# Patient Record
Sex: FEMALE | Race: WHITE | HISPANIC OR LATINO
[De-identification: names, ages, dates, MRNs, and addresses within clinical notes are randomized per-mention and may not be internally consistent; named-entity substitution may affect disease eponyms.]

---

## 2020-10-19 ENCOUNTER — APPOINTMENT (OUTPATIENT)
Dept: ORTHOPEDIC SURGERY | Facility: CLINIC | Age: 55
End: 2020-10-19
Payer: MEDICAID

## 2020-10-19 VITALS
HEART RATE: 62 BPM | BODY MASS INDEX: 28.32 KG/M2 | TEMPERATURE: 97.8 F | HEIGHT: 61 IN | SYSTOLIC BLOOD PRESSURE: 112 MMHG | DIASTOLIC BLOOD PRESSURE: 87 MMHG | WEIGHT: 150 LBS

## 2020-10-19 PROCEDURE — 73564 X-RAY EXAM KNEE 4 OR MORE: CPT | Mod: RT

## 2020-10-19 PROCEDURE — 99072 ADDL SUPL MATRL&STAF TM PHE: CPT

## 2020-10-19 PROCEDURE — 99204 OFFICE O/P NEW MOD 45 MIN: CPT

## 2020-10-19 NOTE — HISTORY OF PRESENT ILLNESS
[de-identified] : 54 year old female  presents with bilateral knee pain, L>R x years. She has been told she has arthritis and her left knee is "bone on bone" requiring a knee replacement. She presents for a second opinion. She was treated with a cortisone injection 2 months ago which helped temporarily. She is limited in the distance she can walk and cannot walk more than one block. She has difficulty navigating steps. She is icing and taking Tylenol for pain relief. She presents with MRIs performed at [facility] 10/5/20 for review.

## 2020-10-19 NOTE — PHYSICAL EXAM
[LE] : Sensory: Intact in bilateral lower extremities [Knee] : patellar 2+ and symmetric bilaterally [Ankle] : ankle 2+ and symmetric bilaterally [Plant] : plantar 2+ and symmetric bilaterally [PT] : posterior tibial 2+ and symmetric bilaterally [DP] : dorsalis pedis 2+ and symmetric bilaterally [Normal] : Alert and in no acute distress [Poor Appearance] : well-appearing [Acute Distress] : not in acute distress [Obese] : not obese [de-identified] : AP, lateral, tunnel and sunrise x-rays of both knees taken today demonstrate moderate to severe degenerative changes with varus alignment.  Left knee is worse than the right.\par The patient presents with MRI of the right and left knee performed October 5, 2020.  They demonstrate severe tricompartmental degenerative changes bilaterally. [de-identified] : The patient has no respiratory distress. Mood and affect are normal. The patient is alert and oriented to person, place and time.\par There is no pain with active or passive motion of the hips.  There is no tenderness of either hip.  She has varus deformities of both knees, worse on the left than on the right.  Range of motion 0 to 100 degrees on the right, 0 to 100 degrees on the left.  There is no instability of collateral or cruciate ligaments.  Quadriceps and hamstring function are intact.  She experiences pain with left knee motion.  Calves are soft and nontender.  The skin is intact.  There is no lymphedema.

## 2020-10-19 NOTE — DISCUSSION/SUMMARY
[de-identified] : The patient has end-stage osteoarthritis of both knees.  I have discussed the pathology, natural history and treatment options with her.  She has not had lasting relief from injections were medication.  She has tried physical therapy as well.  She would be a candidate for knee replacement surgery although it would be beneficial if she would lose weight and stop smoking.  I have gone over the the procedure of total knee replacement, pre and postoperative routines, risks, benefits and alternatives.  She will consider her options.

## 2020-10-26 ENCOUNTER — APPOINTMENT (OUTPATIENT)
Dept: ORTHOPEDIC SURGERY | Facility: CLINIC | Age: 55
End: 2020-10-26
Payer: MEDICAID

## 2020-10-26 VITALS — WEIGHT: 150 LBS | TEMPERATURE: 97.6 F | HEIGHT: 61 IN | BODY MASS INDEX: 28.32 KG/M2

## 2020-10-26 PROCEDURE — 99072 ADDL SUPL MATRL&STAF TM PHE: CPT

## 2020-10-26 PROCEDURE — 99213 OFFICE O/P EST LOW 20 MIN: CPT

## 2020-10-26 RX ORDER — UBIDECARENONE/VIT E ACET 100MG-5
CAPSULE ORAL
Refills: 0 | Status: ACTIVE | COMMUNITY

## 2020-10-26 NOTE — HISTORY OF PRESENT ILLNESS
[de-identified] : 54 year old female  presents for reevaluation of her left knee. She is interested in having a knee replacement and presents to discuss the surgery.

## 2020-10-26 NOTE — DISCUSSION/SUMMARY
[de-identified] : The patient is interested in left knee replacement.  I have gone over the procedure, risks, benefits and alternatives.  She is smoking less than a pack a day and I have informed her that she would be better served by discontinuing smoking prior to having her surgery.  I also would like her to have a consultation with one of my partners regarding robotic knee replacement because of her severe deformity.

## 2020-10-26 NOTE — PHYSICAL EXAM
[LE] : Sensory: Intact in bilateral lower extremities [Knee] : patellar 2+ and symmetric bilaterally [Ankle] : ankle 2+ and symmetric bilaterally [Plant] : plantar 2+ and symmetric bilaterally [DP] : dorsalis pedis 2+ and symmetric bilaterally [PT] : posterior tibial 2+ and symmetric bilaterally [Normal] : Alert and in no acute distress [Poor Appearance] : well-appearing [Acute Distress] : not in acute distress [Obese] : not obese [de-identified] : The patient has no respiratory distress. Mood and affect are normal. The patient is alert and oriented to person, place and time.\par There is no pain with active or passive motion of the hips.  There is no tenderness of either hip.  She has varus deformities of both knees, worse on the left than on the right.  Range of motion 0 to 100 degrees on the right, 0 to 100 degrees on the left.  There is no instability of collateral or cruciate ligaments.  Quadriceps and hamstring function are intact.  She experiences pain with left knee motion.  Calves are soft and nontender.  The skin is intact.  There is no lymphedema.

## 2020-10-30 ENCOUNTER — APPOINTMENT (OUTPATIENT)
Dept: ORTHOPEDIC SURGERY | Facility: CLINIC | Age: 55
End: 2020-10-30
Payer: MEDICAID

## 2020-10-30 VITALS — TEMPERATURE: 97.7 F

## 2020-10-30 VITALS
HEIGHT: 61 IN | WEIGHT: 150 LBS | BODY MASS INDEX: 28.32 KG/M2 | DIASTOLIC BLOOD PRESSURE: 87 MMHG | SYSTOLIC BLOOD PRESSURE: 112 MMHG | HEART RATE: 62 BPM

## 2020-10-30 PROCEDURE — 99214 OFFICE O/P EST MOD 30 MIN: CPT

## 2020-10-30 PROCEDURE — 99072 ADDL SUPL MATRL&STAF TM PHE: CPT

## 2020-11-05 NOTE — PHYSICAL EXAM
[Normal RUE] : Right Upper Extremity: No scars, rashes, lesions, ulcers, skin intact [Normal Finger/nose] : finger to nose coordination [Normal Touch] : sensation intact for touch [Poor Appearance] : well-appearing [Acute Distress] : not in acute distress [Obese] : obese [Normal] : no peripheral adenopathy appreciated [de-identified] : Patient appears stated age in no acute respiratory distress. Patient is alert oriented x3. Patient has normal mood and affect. \par Bilateral knee exam\par There is minimal to moderate effusion. Range of motion is 0-120°. Painful at the extremes of range of motion. \par There is medial and lateral joint line tenderness. \par Quadriceps strength is 4/5. There is some muscle loss in the quadriceps. \par Anteroposterior and mediolateral stability is intact Arnulfo test is negative. Alignment of the knee is in 5° of varus.\par 		\par \par Bilateral hip exam\par On inspection of the hip shows skin is normal. No evidence of rash. \par No loss of muscle.  Abductor strength is 5 out of 5. Hip flexor strength is 5.\par Range of motion of the hip at 90° flexion internal rotation is 15° external rotation is 30° pain-free. \par Hip has good stability in anterior and posterior direction. \par On lateral decubitus  examination there is no tenderness in the greater trochanter. \par Lower Extremity Examination \par Bilateral lower extremity skin is normal. There is no rash. There is no edema and lymphadenopathy.  DP and PT pulses intact. Sensation is intact. [de-identified] : X-rays of the bilateral knee 3 views shows moderate arthritis from different place

## 2020-11-05 NOTE — HISTORY OF PRESENT ILLNESS
[de-identified] : 54 year old female  presents with bilateral knee pain, L>R x years. She has been told she has arthritis and her left knee is "bone on bone" requiring a knee replacement. She presents for a second opinion. She was treated with a cortisone injection 2 months ago which helped temporarily. She is limited in the distance she can walk and cannot walk more than one block. She has difficulty navigating steps. She is icing and taking Tylenol for pain relief. She presents with MRIs performed at [facility] 10/5/20 for review. She saw Dr. Selwyn Hoang who said she would most benefit from robotic TKRs due to her valgus deformities. [2] : a minimum pain level of 2/10 [8] : a maximum pain level of 8/10 [Acetaminophen] : not relieved by acetaminophen [Exercise Regimen] : not relieved by exercise regimen [NSAIDs] : relieved by nonsteroidal anti-inflammatory drugs

## 2020-11-05 NOTE — DISCUSSION/SUMMARY
[de-identified] : Moderate arthritis of bilateral knee we will try conservative treatment NSAIDs physical therapy range of motion strengthening exercise.  Follow-up in 3 months

## 2022-06-21 ENCOUNTER — APPOINTMENT (OUTPATIENT)
Dept: ORTHOPEDIC SURGERY | Facility: CLINIC | Age: 57
End: 2022-06-21
Payer: MEDICAID

## 2022-06-21 VITALS — BODY MASS INDEX: 29.27 KG/M2 | WEIGHT: 155 LBS | HEIGHT: 61 IN

## 2022-06-21 PROCEDURE — 99204 OFFICE O/P NEW MOD 45 MIN: CPT

## 2022-06-21 NOTE — PHYSICAL EXAM
[Bilateral] : knee bilaterally [5___] : hamstring 5[unfilled]/5 [Positive] : positive Alexa [] : negative sitting straight leg raise [TWNoteComboBox7] : flexion 115 degrees [de-identified] : extension 0 degrees

## 2022-06-21 NOTE — HISTORY OF PRESENT ILLNESS
[Gradual] : gradual [7] : 7 [5] : 5 [Dull/Aching] : dull/aching [Localized] : localized [Intermittent] : intermittent [Meds] : meds [Standing] : standing [Walking] : walking [de-identified] : Has long h/o OA knees, left worse than right. Also has low back pain for long period of time. Has had cortisone injection left knee which didn't help. Wanted to try viscoinjections. Was told needs replacement [] : no [FreeTextEntry1] : CLEOPATRA Knees and lower back pain  [FreeTextEntry3] : a few years  [FreeTextEntry5] : No injury/trauma

## 2022-09-13 ENCOUNTER — APPOINTMENT (OUTPATIENT)
Dept: ORTHOPEDIC SURGERY | Facility: CLINIC | Age: 57
End: 2022-09-13

## 2022-09-13 VITALS — WEIGHT: 155 LBS | HEIGHT: 61 IN | BODY MASS INDEX: 29.27 KG/M2

## 2022-09-13 DIAGNOSIS — M43.16 SPONDYLOLISTHESIS, LUMBAR REGION: ICD-10-CM

## 2022-09-13 DIAGNOSIS — M17.0 BILATERAL PRIMARY OSTEOARTHRITIS OF KNEE: ICD-10-CM

## 2022-09-13 DIAGNOSIS — G89.29 LUMBAGO WITH SCIATICA, RIGHT SIDE: ICD-10-CM

## 2022-09-13 DIAGNOSIS — M54.41 LUMBAGO WITH SCIATICA, RIGHT SIDE: ICD-10-CM

## 2022-09-13 DIAGNOSIS — M51.36 OTHER INTERVERTEBRAL DISC DEGENERATION, LUMBAR REGION: ICD-10-CM

## 2022-09-13 DIAGNOSIS — M17.11 UNILATERAL PRIMARY OSTEOARTHRITIS, RIGHT KNEE: ICD-10-CM

## 2022-09-13 PROCEDURE — 99213 OFFICE O/P EST LOW 20 MIN: CPT

## 2022-09-13 NOTE — PHYSICAL EXAM
[Bilateral] : knee bilaterally [5___] : hamstring 5[unfilled]/5 [Positive] : positive Alexa [] : negative sitting straight leg raise [FreeTextEntry8] : improving [TWNoteComboBox7] : flexion 115 degrees [de-identified] : extension 0 degrees

## 2022-09-13 NOTE — HISTORY OF PRESENT ILLNESS
[Lower back] : lower back [Gradual] : gradual [7] : 7 [5] : 5 [Dull/Aching] : dull/aching [Localized] : localized [Intermittent] : intermittent [Meds] : meds [Standing] : standing [Walking] : walking [de-identified] : Has long h/o OA knees, left worse than right. Also has low back pain for long period of time. Has had cortisone injection left knee which didn't help. Wanted to try viscoinjections. Was told needs replacement [] : no [FreeTextEntry1] : CLEOPATRA Knees and lower back pain  [FreeTextEntry3] : a few years  [FreeTextEntry5] : No injury/trauma

## 2023-01-11 ENCOUNTER — OFFICE VISIT (OUTPATIENT)
Dept: OBGYN CLINIC | Facility: CLINIC | Age: 58
End: 2023-01-11

## 2023-01-11 ENCOUNTER — APPOINTMENT (OUTPATIENT)
Dept: RADIOLOGY | Facility: CLINIC | Age: 58
End: 2023-01-11

## 2023-01-11 ENCOUNTER — PREP FOR PROCEDURE (OUTPATIENT)
Dept: OBGYN CLINIC | Facility: CLINIC | Age: 58
End: 2023-01-11

## 2023-01-11 VITALS
SYSTOLIC BLOOD PRESSURE: 126 MMHG | DIASTOLIC BLOOD PRESSURE: 80 MMHG | BODY MASS INDEX: 34.52 KG/M2 | WEIGHT: 160 LBS | HEIGHT: 57 IN

## 2023-01-11 DIAGNOSIS — G89.29 CHRONIC PAIN OF LEFT KNEE: Primary | ICD-10-CM

## 2023-01-11 DIAGNOSIS — Z01.818 PREOP TESTING: ICD-10-CM

## 2023-01-11 DIAGNOSIS — M17.11 PRIMARY OSTEOARTHRITIS OF RIGHT KNEE: Primary | ICD-10-CM

## 2023-01-11 DIAGNOSIS — M25.561 PAIN IN BOTH KNEES, UNSPECIFIED CHRONICITY: ICD-10-CM

## 2023-01-11 DIAGNOSIS — M25.562 CHRONIC PAIN OF LEFT KNEE: Primary | ICD-10-CM

## 2023-01-11 DIAGNOSIS — M17.12 PRIMARY OSTEOARTHRITIS OF LEFT KNEE: ICD-10-CM

## 2023-01-11 DIAGNOSIS — M25.562 PAIN IN BOTH KNEES, UNSPECIFIED CHRONICITY: ICD-10-CM

## 2023-01-11 RX ORDER — FOLIC ACID 1 MG/1
1 TABLET ORAL DAILY
Qty: 30 TABLET | Refills: 2 | Status: SHIPPED | OUTPATIENT
Start: 2023-01-11

## 2023-01-11 RX ORDER — CHLORHEXIDINE GLUCONATE 0.12 MG/ML
15 RINSE ORAL ONCE
OUTPATIENT
Start: 2023-01-11 | End: 2023-01-11

## 2023-01-11 RX ORDER — ROPIVACAINE HYDROCHLORIDE 5 MG/ML
10 INJECTION, SOLUTION EPIDURAL; INFILTRATION; PERINEURAL
Status: COMPLETED | OUTPATIENT
Start: 2023-01-11 | End: 2023-01-11

## 2023-01-11 RX ORDER — FERROUS SULFATE TAB EC 324 MG (65 MG FE EQUIVALENT) 324 (65 FE) MG
324 TABLET DELAYED RESPONSE ORAL
Qty: 60 TABLET | Refills: 2 | Status: SHIPPED | OUTPATIENT
Start: 2023-01-11

## 2023-01-11 RX ORDER — NAPROXEN 500 MG/1
500 TABLET ORAL 2 TIMES DAILY WITH MEALS
Qty: 60 TABLET | Refills: 0 | Status: SHIPPED | OUTPATIENT
Start: 2023-01-11

## 2023-01-11 RX ORDER — ALENDRONATE SODIUM 70 MG/1
TABLET ORAL
COMMUNITY
Start: 2023-01-04

## 2023-01-11 RX ORDER — BETAMETHASONE SODIUM PHOSPHATE AND BETAMETHASONE ACETATE 3; 3 MG/ML; MG/ML
6 INJECTION, SUSPENSION INTRA-ARTICULAR; INTRALESIONAL; INTRAMUSCULAR; SOFT TISSUE
Status: COMPLETED | OUTPATIENT
Start: 2023-01-11 | End: 2023-01-11

## 2023-01-11 RX ORDER — SODIUM CHLORIDE, SODIUM LACTATE, POTASSIUM CHLORIDE, CALCIUM CHLORIDE 600; 310; 30; 20 MG/100ML; MG/100ML; MG/100ML; MG/100ML
50 INJECTION, SOLUTION INTRAVENOUS CONTINUOUS
OUTPATIENT
Start: 2023-01-11

## 2023-01-11 RX ORDER — CHLORHEXIDINE GLUCONATE 4 G/100ML
SOLUTION TOPICAL DAILY PRN
OUTPATIENT
Start: 2023-01-11

## 2023-01-11 RX ORDER — NAPROXEN 500 MG/1
500 TABLET ORAL 2 TIMES DAILY WITH MEALS
COMMUNITY
End: 2023-01-11 | Stop reason: SDUPTHER

## 2023-01-11 RX ORDER — ASCORBIC ACID 500 MG
500 TABLET ORAL 2 TIMES DAILY
Qty: 60 TABLET | Refills: 2 | Status: SHIPPED | OUTPATIENT
Start: 2023-01-11

## 2023-01-11 RX ADMIN — ROPIVACAINE HYDROCHLORIDE 10 ML: 5 INJECTION, SOLUTION EPIDURAL; INFILTRATION; PERINEURAL at 11:18

## 2023-01-11 RX ADMIN — BETAMETHASONE SODIUM PHOSPHATE AND BETAMETHASONE ACETATE 6 MG: 3; 3 INJECTION, SUSPENSION INTRA-ARTICULAR; INTRALESIONAL; INTRAMUSCULAR; SOFT TISSUE at 11:18

## 2023-01-11 NOTE — PROGRESS NOTES
Assessment:     1  Primary osteoarthritis of right knee    2  Preop testing    3  Primary osteoarthritis of left knee        Plan:     Problem List Items Addressed This Visit        Musculoskeletal and Integument    Primary osteoarthritis of right knee - Primary    Relevant Medications    naproxen (NAPROSYN) 500 mg tablet    Other Relevant Orders    Large joint arthrocentesis: R knee    Primary osteoarthritis of left knee    Relevant Medications    naproxen (NAPROSYN) 500 mg tablet    ascorbic acid (VITAMIN C) 500 MG tablet    ferrous sulfate 324 (65 Fe) mg    folic acid (FOLVITE) 1 mg tablet    Multiple Vitamin (MULTI-VITAMIN) tablet    Other Relevant Orders    XR knee 4+ vw left injury    XR knee 4+ vw right injury    Comprehensive metabolic panel    Hemoglobin A1C W/EAG Estimation    CBC and differential    Anemia Panel w/Reflex    Protime-INR    APTT    Type and screen    Ambulatory referral to [facility]    Ambulatory referral to Physical Therapy    Case request operating room: ARTHROPLASTY KNEE TOTAL (Completed)    EKG 12 lead    XR chest pa & lateral   Other Visit Diagnoses     Preop testing        Relevant Medications    ascorbic acid (VITAMIN C) 500 MG tablet    ferrous sulfate 324 (65 Fe) mg    folic acid (FOLVITE) 1 mg tablet    Multiple Vitamin (MULTI-VITAMIN) tablet    Other Relevant Orders    Comprehensive metabolic panel    Hemoglobin A1C W/EAG Estimation    CBC and differential    Anemia Panel w/Reflex    Protime-INR    APTT    Type and screen    Ambulatory referral to [facility]    Ambulatory referral to Physical Therapy    EKG 12 lead    XR chest pa & lateral        Findings today are consistent with severe end stage bilateral knee osteoarthritis  Imaging and prognosis was reviewed with the patient today  Patient has not had success with conservative treatments in the form of cortisone steroid injections, activities modification, home exercises, and treatment with other orthopedic doctor  I have discussed with patient continue conservative managements versus knee replacement  Patient has elected to proceed with a left total knee arthroplasty and right knee cortisone injection, since I do not recommend bilateral knee replacement at same time  Cortisone steroid injection was administered today to the right knee  Patient should avoid strenuous activities for the next 1-2 days  Patient should avoid vaccines for the next 2 weeks if possible  Can apply cold compress for soreness  If patient feels relief with the cortisone injections, procedure can be repeated every 3-4 months as needed  If patient sees minimal/no relief with cortisone injection, treatment with VISCO injections can be tried  I discussed with patient smoking cessation due to affect on healing after surgery  All patient's questions were answered to her satisfaction  This note is created using dictation transcription  It may contain typographical errors, grammatical errors, improperly dictated words, background noise and other errors  Discussed with patient surgical risks and complications including but not limited to infection, persistent pain, nerve and vessel injury, complications associated with anesthesia, DVT, exposure to COVID virus, problem with prosthesis, etc   Patient understands the risks and complication and consented to the surgery  Subjective:     Patient ID: Bruce Woo is a 56 y o  female  Chief Complaint:  The patient presents with a chief complaint of bilateral knee pain, left worse than the right  The pain began several year(s) ago and is not associated with an acute injury  The patient describes the pain as aching and sharp and 8 out of 10 in intensity  It is constant in timing, and localizes the pain to the anterior knee   The pain is worse with walking, standing, ascending stairs, descending stairs, going from a seated to standing position and squatting and relieved with rest, heat, ice, medication: Naproxen and tylenol used and beneficial, avoiding painful activities, cortisone injection  She admits to mechanical symptoms such as locking and catching  She admits to instability of the knee  Patient states she had a CSI 4-5 months ago with a rheumatologist in [place] and states she got moderate relief  She states she has had multiple CSI in the past  Patient notes she has history of Lupus  Patient is ambulating with cane today  Patient was also treated by orthopedic doctor in [facility] and also had recommendation for knee replacements in the past   Patient does not want to continue conservative treatments due to continued pain despite injections, activity modification, and home exercises  Information on patient's intake form was reviewed  Allergy:  No Known Allergies  Medications:  all current active meds have been reviewed  Past Medical History:  Past Medical History:   Diagnosis Date   • Asthma    • Lupus ([facility] [address] )      Past Surgical History:  Past Surgical History:   Procedure Laterality Date   • FOOT SURGERY Right      Family History:  History reviewed  No pertinent family history  Social History:  Social History     Substance and Sexual Activity   Alcohol Use Yes    Comment: occasional     Social History     Substance and Sexual Activity   Drug Use No     Social History     Tobacco Use   Smoking Status Every Day   • Packs/day: 0 50   • Years: 20 00   • Pack years: 10 00   • Types: Cigarettes   Smokeless Tobacco Not on file     Review of Systems   Constitutional: Negative for chills and fever  HENT: Negative for ear pain and sore throat  Eyes: Negative for pain and visual disturbance  Respiratory: Negative for cough and shortness of breath  Cardiovascular: Negative for chest pain and palpitations  Gastrointestinal: Negative for abdominal pain and vomiting  Genitourinary: Negative for dysuria and hematuria     Musculoskeletal: Positive for arthralgias (bilateral knee), gait problem (Ambulate with a cane) and joint swelling (Bilateral knee)  Negative for back pain  Skin: Negative for color change and rash  Neurological: Negative for seizures and syncope  Psychiatric/Behavioral: Negative  All other systems reviewed and are negative  Objective:  BP Readings from Last 1 Encounters:   01/11/23 126/80      Wt Readings from Last 1 Encounters:   01/11/23 72 6 kg (160 lb)      BMI:   Estimated body mass index is 34 62 kg/m² as calculated from the following:    Height as of this encounter: 4' 9" (1 448 m)  Weight as of this encounter: 72 6 kg (160 lb)  BSA:   Estimated body surface area is 1 64 meters squared as calculated from the following:    Height as of this encounter: 4' 9" (1 448 m)  Weight as of this encounter: 72 6 kg (160 lb)  Physical Exam  Vitals and nursing note reviewed  Constitutional:       Appearance: Normal appearance  She is well-developed  HENT:      Head: Normocephalic and atraumatic  Right Ear: External ear normal       Left Ear: External ear normal    Eyes:      Extraocular Movements: Extraocular movements intact  Conjunctiva/sclera: Conjunctivae normal       Pupils: Pupils are equal, round, and reactive to light  Cardiovascular:      Rate and Rhythm: Regular rhythm  Heart sounds: Normal heart sounds  No murmur heard  No gallop  Pulmonary:      Effort: Pulmonary effort is normal       Breath sounds: Wheezing present  Abdominal:      Palpations: Abdomen is soft  Musculoskeletal:      Cervical back: Normal range of motion and neck supple  Right knee: No effusion  Instability Tests: Medial Delta test negative and lateral Delta test negative  Left knee: No effusion  Instability Tests: Medial Delta test negative and lateral Delta test negative  Skin:     General: Skin is warm and dry  Neurological:      Mental Status: She is alert and oriented to person, place, and time     Psychiatric:         Mood and Affect: Mood normal          Behavior: Behavior normal        Right Knee Exam     Tenderness   The patient is experiencing tenderness in the medial joint line and lateral joint line  Range of Motion   Extension: 0   Flexion: 110     Tests   Delta:  Medial - negative Lateral - negative  Varus: negative Valgus: negative    Other   Erythema: absent  Scars: absent  Sensation: normal  Pulse: present  Swelling: mild  Effusion: no effusion present    Comments:  Genu varum      Left Knee Exam     Tenderness   The patient is experiencing tenderness in the medial joint line and lateral joint line  Range of Motion   Extension: 5   Flexion: 110     Tests   Delta:  Medial - negative Lateral - negative  Varus: negative Valgus: negative    Other   Erythema: absent  Scars: absent  Sensation: normal  Pulse: present  Swelling: mild  Effusion: no effusion present    Comments:  Genu Varum          Large joint arthrocentesis: R knee  Universal Protocol:  Consent: Verbal consent obtained  Risks and benefits: risks, benefits and alternatives were discussed  Consent given by: patient  Time out: Immediately prior to procedure a "time out" was called to verify the correct patient, procedure, equipment, support staff and site/side marked as required    Timeout called at: 1/11/2023 11:17 AM   Patient understanding: patient states understanding of the procedure being performed  Site marked: the operative site was marked  Patient identity confirmed: verbally with patient    Supporting Documentation  Indications: pain   Procedure Details  Location: knee - R knee  Preparation: Patient was prepped and draped in the usual sterile fashion  Needle size: 22 G  Ultrasound guidance: no  Approach: anterolateral  Medications administered: 6 mg betamethasone acetate-betamethasone sodium phosphate 6 (3-3) mg/mL; 10 mL ropivacaine 0 5 %    Patient tolerance: patient tolerated the procedure well with no immediate complications  Dressing:  Sterile dressing applied        I have personally reviewed pertinent films in PACS and my interpretation is XR bilateral knee demonstrates severe degenerative changes: severe osteoarthritis       Scribe Attestation    I,:  Mirtha Campo am acting as a scribe while in the presence of the attending physician :       I,:  Marycruz Caraballo MD personally performed the services described in this documentation    as scribed in my presence :

## 2023-01-11 NOTE — H&P (VIEW-ONLY)
Assessment:     1  Primary osteoarthritis of right knee    2  Preop testing    3  Primary osteoarthritis of left knee        Plan:     Problem List Items Addressed This Visit        Musculoskeletal and Integument    Primary osteoarthritis of right knee - Primary    Relevant Medications    naproxen (NAPROSYN) 500 mg tablet    Other Relevant Orders    Large joint arthrocentesis: R knee    Primary osteoarthritis of left knee    Relevant Medications    naproxen (NAPROSYN) 500 mg tablet    ascorbic acid (VITAMIN C) 500 MG tablet    ferrous sulfate 324 (65 Fe) mg    folic acid (FOLVITE) 1 mg tablet    Multiple Vitamin (MULTI-VITAMIN) tablet    Other Relevant Orders    XR knee 4+ vw left injury    XR knee 4+ vw right injury    Comprehensive metabolic panel    Hemoglobin A1C W/EAG Estimation    CBC and differential    Anemia Panel w/Reflex    Protime-INR    APTT    Type and screen    Ambulatory referral to [facility]    Ambulatory referral to Physical Therapy    Case request operating room: ARTHROPLASTY KNEE TOTAL (Completed)    EKG 12 lead    XR chest pa & lateral   Other Visit Diagnoses     Preop testing        Relevant Medications    ascorbic acid (VITAMIN C) 500 MG tablet    ferrous sulfate 324 (65 Fe) mg    folic acid (FOLVITE) 1 mg tablet    Multiple Vitamin (MULTI-VITAMIN) tablet    Other Relevant Orders    Comprehensive metabolic panel    Hemoglobin A1C W/EAG Estimation    CBC and differential    Anemia Panel w/Reflex    Protime-INR    APTT    Type and screen    Ambulatory referral to [facility]    Ambulatory referral to Physical Therapy    EKG 12 lead    XR chest pa & lateral        Findings today are consistent with severe end stage bilateral knee osteoarthritis  Imaging and prognosis was reviewed with the patient today  Patient has not had success with conservative treatments in the form of cortisone steroid injections, activities modification, home exercises, and treatment with other orthopedic doctor  I have discussed with patient continue conservative managements versus knee replacement  Patient has elected to proceed with a left total knee arthroplasty and right knee cortisone injection, since I do not recommend bilateral knee replacement at same time  Cortisone steroid injection was administered today to the right knee  Patient should avoid strenuous activities for the next 1-2 days  Patient should avoid vaccines for the next 2 weeks if possible  Can apply cold compress for soreness  If patient feels relief with the cortisone injections, procedure can be repeated every 3-4 months as needed  If patient sees minimal/no relief with cortisone injection, treatment with VISCO injections can be tried  I discussed with patient smoking cessation due to affect on healing after surgery  There is no first floor bathroom and it's medically necessary patient receives a commode  All patient's questions were answered to her satisfaction  This note is created using dictation transcription  It may contain typographical errors, grammatical errors, improperly dictated words, background noise and other errors  Discussed with patient surgical risks and complications including but not limited to infection, persistent pain, nerve and vessel injury, complications associated with anesthesia, DVT, exposure to COVID virus, problem with prosthesis, etc   Patient understands the risks and complication and consented to the surgery  Subjective:     Patient ID: Derrick Gonzales is a 56 y o  female  Chief Complaint:  The patient presents with a chief complaint of bilateral knee pain, left worse than the right  The pain began several year(s) ago and is not associated with an acute injury  The patient describes the pain as aching and sharp and 8 out of 10 in intensity  It is constant in timing, and localizes the pain to the anterior knee   The pain is worse with walking, standing, ascending stairs, descending stairs, going from a seated to standing position and squatting and relieved with rest, heat, ice, medication: Naproxen and tylenol used and beneficial, avoiding painful activities, cortisone injection  She admits to mechanical symptoms such as locking and catching  She admits to instability of the knee  Patient states she had a CSI 4-5 months ago with a rheumatologist in [place] and states she got moderate relief  She states she has had multiple CSI in the past  Patient notes she has history of Lupus  Patient is ambulating with cane today  Patient was also treated by orthopedic doctor in [facility] and also had recommendation for knee replacements in the past   Patient does not want to continue conservative treatments due to continued pain despite injections, activity modification, and home exercises  Information on patient's intake form was reviewed  Allergy:  No Known Allergies  Medications:  all current active meds have been reviewed  Past Medical History:  Past Medical History:   Diagnosis Date   • Asthma    • Lupus ([facility] [address] )      Past Surgical History:  Past Surgical History:   Procedure Laterality Date   • FOOT SURGERY Right      Family History:  History reviewed  No pertinent family history  Social History:  Social History     Substance and Sexual Activity   Alcohol Use Yes    Comment: occasional     Social History     Substance and Sexual Activity   Drug Use No     Social History     Tobacco Use   Smoking Status Every Day   • Packs/day: 0 50   • Years: 20 00   • Pack years: 10 00   • Types: Cigarettes   Smokeless Tobacco Not on file     Review of Systems   Constitutional: Negative for chills and fever  HENT: Negative for ear pain and sore throat  Eyes: Negative for pain and visual disturbance  Respiratory: Negative for cough and shortness of breath  Cardiovascular: Negative for chest pain and palpitations  Gastrointestinal: Negative for abdominal pain and vomiting     Genitourinary: Negative for dysuria and hematuria  Musculoskeletal: Positive for arthralgias (bilateral knee), gait problem (Ambulate with a cane) and joint swelling (Bilateral knee)  Negative for back pain  Skin: Negative for color change and rash  Neurological: Negative for seizures and syncope  Psychiatric/Behavioral: Negative  All other systems reviewed and are negative  Objective:  BP Readings from Last 1 Encounters:   01/11/23 126/80      Wt Readings from Last 1 Encounters:   01/11/23 72 6 kg (160 lb)      BMI:   Estimated body mass index is 34 62 kg/m² as calculated from the following:    Height as of this encounter: 4' 9" (1 448 m)  Weight as of this encounter: 72 6 kg (160 lb)  BSA:   Estimated body surface area is 1 64 meters squared as calculated from the following:    Height as of this encounter: 4' 9" (1 448 m)  Weight as of this encounter: 72 6 kg (160 lb)  Physical Exam  Vitals and nursing note reviewed  Constitutional:       Appearance: Normal appearance  She is well-developed  HENT:      Head: Normocephalic and atraumatic  Right Ear: External ear normal       Left Ear: External ear normal    Eyes:      Extraocular Movements: Extraocular movements intact  Conjunctiva/sclera: Conjunctivae normal       Pupils: Pupils are equal, round, and reactive to light  Cardiovascular:      Rate and Rhythm: Regular rhythm  Heart sounds: Normal heart sounds  No murmur heard  No gallop  Pulmonary:      Effort: Pulmonary effort is normal       Breath sounds: Wheezing present  Abdominal:      Palpations: Abdomen is soft  Musculoskeletal:      Cervical back: Normal range of motion and neck supple  Right knee: No effusion  Instability Tests: Medial Delta test negative and lateral Delta test negative  Left knee: No effusion  Instability Tests: Medial Delta test negative and lateral Delta test negative  Skin:     General: Skin is warm and dry     Neurological:      Mental Status: She is alert and oriented to person, place, and time  Psychiatric:         Mood and Affect: Mood normal          Behavior: Behavior normal        Right Knee Exam     Tenderness   The patient is experiencing tenderness in the medial joint line and lateral joint line  Range of Motion   Extension: 0   Flexion: 110     Tests   Delta:  Medial - negative Lateral - negative  Varus: negative Valgus: negative    Other   Erythema: absent  Scars: absent  Sensation: normal  Pulse: present  Swelling: mild  Effusion: no effusion present    Comments:  Genu varum      Left Knee Exam     Tenderness   The patient is experiencing tenderness in the medial joint line and lateral joint line  Range of Motion   Extension: 5   Flexion: 110     Tests   Delta:  Medial - negative Lateral - negative  Varus: negative Valgus: negative    Other   Erythema: absent  Scars: absent  Sensation: normal  Pulse: present  Swelling: mild  Effusion: no effusion present    Comments:  Genu Varum          Large joint arthrocentesis: R knee  Universal Protocol:  Consent: Verbal consent obtained  Risks and benefits: risks, benefits and alternatives were discussed  Consent given by: patient  Time out: Immediately prior to procedure a "time out" was called to verify the correct patient, procedure, equipment, support staff and site/side marked as required    Timeout called at: 1/11/2023 11:17 AM   Patient understanding: patient states understanding of the procedure being performed  Site marked: the operative site was marked  Patient identity confirmed: verbally with patient    Supporting Documentation  Indications: pain   Procedure Details  Location: knee - R knee  Preparation: Patient was prepped and draped in the usual sterile fashion  Needle size: 22 G  Ultrasound guidance: no  Approach: anterolateral  Medications administered: 6 mg betamethasone acetate-betamethasone sodium phosphate 6 (3-3) mg/mL; 10 mL ropivacaine 0 5 %    Patient tolerance: patient tolerated the procedure well with no immediate complications  Dressing:  Sterile dressing applied        I have personally reviewed pertinent films in PACS and my interpretation is XR bilateral knee demonstrates severe degenerative changes: severe osteoarthritis       Scribe Attestation    I,:  David Castrejon am acting as a scribe while in the presence of the attending physician :       I,:  Master Guillen MD personally performed the services described in this documentation    as scribed in my presence :

## 2023-01-16 ENCOUNTER — TELEPHONE (OUTPATIENT)
Dept: OBGYN CLINIC | Facility: HOSPITAL | Age: 58
End: 2023-01-16

## 2023-01-16 NOTE — TELEPHONE ENCOUNTER
Preoperative Elective Admission Assessment- spoke to patient    [facility]-  UB     Living Situation:    Who does pt live with: Son, Daughter and grand daughter   What kind of home: multi-level  How do they enter the home: front  How many levels in home: 2   # of steps to enter home: 2-3 steps to enter   # of steps to second floor: 12-13 to 2nd floor   Are there handrails: Yes   Are there landings: Yes  Sleeping arrangement: first/entry floor  Where is Bathroom: First floor 1/2 bathroom  Where is the tub or shower: Second floor full bathroom is a step in tub     First Floor Setup:   Is there a bathroom: Yes  Where would pt sleep: couch, potentially moving bed down  DME: frame walker and cane, has script for [facility] and instructed to get  Patient's Current Level of Function: Ambulates with cane and ADLs: Independent    Post-op Caregiver: child  Caregiver Name and phone number for Inpatient discharge needs: Daughter and Son ([phone])  Currently receive any HHC/aides/community supports: No     Post-op Transport: child  To/from hospital: child  To/from PT 2-3x/week: child  Uses community transport now: No     Outpatient Physical Therapy Site:  Site: Not set up- will clarify with surgeon- patient told HHC  pre and post-op appts scheduled? No     Medication Management: self  Preferred Pharmacy for Post-op Medications: [facility]   Blood Management Vitamin Regimen: Pt confirms taking as prescribed  Post-op anticoagulant: to be determined by surgical team postoperatively     DC Plan: Pt plans to be discharged home  Pt educated that our goal, if at all possible, is to appropriately discharge patient based off their post-op function while striving to maintain maximal independence  If possible, the goal is to discharge patient to home and for them to attend outpatient physical therapy      Barriers to DC identified preoperatively: transportation- reports the surgeon told her [address] after surgery     BMI: 34 62     Not enrolled in [facility]     Patient Education:  Pt educated on post-op pain, early mobilization (POD0), Average inpt LOS, OP PT goal   Patient educated that our goal is to appropriately discharge patient based off their post-op function while striving to maintain maximal independence  The goal is to discharge patient to home and for them to attend outpatient physical therapy

## 2023-01-17 ENCOUNTER — APPOINTMENT (OUTPATIENT)
Dept: LAB | Facility: HOSPITAL | Age: 58
End: 2023-01-17

## 2023-01-17 ENCOUNTER — HOSPITAL ENCOUNTER (OUTPATIENT)
Dept: RADIOLOGY | Facility: HOSPITAL | Age: 58
Discharge: HOME | End: 2023-01-17

## 2023-01-17 DIAGNOSIS — Z01.818 PREOP TESTING: ICD-10-CM

## 2023-01-17 DIAGNOSIS — M17.12 OSTEOARTHRITIS OF LEFT KNEE, UNSPECIFIED OSTEOARTHRITIS TYPE: ICD-10-CM

## 2023-01-17 DIAGNOSIS — M17.12 PRIMARY OSTEOARTHRITIS OF LEFT KNEE: ICD-10-CM

## 2023-01-17 DIAGNOSIS — Z01.818 OTHER SPECIFIED PRE-OPERATIVE EXAMINATION: ICD-10-CM

## 2023-01-17 LAB
ABO GROUP BLD: NORMAL
ALBUMIN SERPL BCP-MCNC: 3.7 G/DL (ref 3.5–5)
ALP SERPL-CCNC: 86 U/L (ref 46–116)
ALT SERPL W P-5'-P-CCNC: 22 U/L (ref 12–78)
ANION GAP SERPL CALCULATED.3IONS-SCNC: 5 MMOL/L (ref 4–13)
APTT PPP: 26 SECONDS (ref 23–37)
AST SERPL W P-5'-P-CCNC: 14 U/L (ref 5–45)
BASOPHILS # BLD AUTO: 0.05 THOUSANDS/ÂΜL (ref 0–0.1)
BASOPHILS NFR BLD AUTO: 1 % (ref 0–1)
BILIRUB SERPL-MCNC: 0.47 MG/DL (ref 0.2–1)
BLD GP AB SCN SERPL QL: NEGATIVE
BUN SERPL-MCNC: 13 MG/DL (ref 5–25)
CALCIUM SERPL-MCNC: 9.4 MG/DL (ref 8.3–10.1)
CHLORIDE SERPL-SCNC: 109 MMOL/L (ref 96–108)
CO2 SERPL-SCNC: 25 MMOL/L (ref 21–32)
CREAT SERPL-MCNC: 0.52 MG/DL (ref 0.6–1.3)
EOSINOPHIL # BLD AUTO: 0.21 THOUSAND/ÂΜL (ref 0–0.61)
EOSINOPHIL NFR BLD AUTO: 4 % (ref 0–6)
ERYTHROCYTE [DISTWIDTH] IN BLOOD BY AUTOMATED COUNT: 12.8 % (ref 11.6–15.1)
EST. AVERAGE GLUCOSE BLD GHB EST-MCNC: 105 MG/DL
GFR SERPL CREATININE-BSD FRML MDRD: 107 ML/MIN/1.73SQ M
GLUCOSE P FAST SERPL-MCNC: 89 MG/DL (ref 65–99)
HBA1C MFR BLD: 5.3 %
HCT VFR BLD AUTO: 39.3 % (ref 34.8–46.1)
HGB BLD-MCNC: 13 G/DL (ref 11.5–15.4)
IMM GRANULOCYTES # BLD AUTO: 0.01 THOUSAND/UL (ref 0–0.2)
IMM GRANULOCYTES NFR BLD AUTO: 0 % (ref 0–2)
INR PPP: 0.85 (ref 0.84–1.19)
LYMPHOCYTES # BLD AUTO: 2.48 THOUSANDS/ÂΜL (ref 0.6–4.47)
LYMPHOCYTES NFR BLD AUTO: 43 % (ref 14–44)
MCH RBC QN AUTO: 30.2 PG (ref 26.8–34.3)
MCHC RBC AUTO-ENTMCNC: 33.1 G/DL (ref 31.4–37.4)
MCV RBC AUTO: 91 FL (ref 82–98)
MONOCYTES # BLD AUTO: 0.4 THOUSAND/ÂΜL (ref 0.17–1.22)
MONOCYTES NFR BLD AUTO: 7 % (ref 4–12)
NEUTROPHILS # BLD AUTO: 2.62 THOUSANDS/ÂΜL (ref 1.85–7.62)
NEUTS SEG NFR BLD AUTO: 45 % (ref 43–75)
NRBC BLD AUTO-RTO: 0 /100 WBCS
PLATELET # BLD AUTO: 322 THOUSANDS/UL (ref 149–390)
PMV BLD AUTO: 9.3 FL (ref 8.9–12.7)
POTASSIUM SERPL-SCNC: 3.9 MMOL/L (ref 3.5–5.3)
PROT SERPL-MCNC: 7.4 G/DL (ref 6.4–8.4)
PROTHROMBIN TIME: 12.3 SECONDS (ref 11.6–14.5)
RBC # BLD AUTO: 4.31 MILLION/UL (ref 3.81–5.12)
RH BLD: POSITIVE
SODIUM SERPL-SCNC: 139 MMOL/L (ref 135–147)
SPECIMEN EXPIRATION DATE: NORMAL
WBC # BLD AUTO: 5.77 THOUSAND/UL (ref 4.31–10.16)

## 2023-01-17 NOTE — TELEPHONE ENCOUNTER
Spoke to patient  Explained how postoperative PT is ordered postop, based on function in the hospital with our inpatient PT team  Educated to plan for outpatient PT      pt encouraged to call me with questions, concerns or issues

## 2023-01-19 RX ORDER — HYDROXYCHLOROQUINE SULFATE 200 MG/1
200 TABLET, FILM COATED ORAL
COMMUNITY

## 2023-01-19 NOTE — PRE-PROCEDURE INSTRUCTIONS
Pre-Surgery Instructions:   Medication Instructions   • albuterol (2 5 mg/3 mL) 0 083 % nebulizer solution Uses PRN- OK to take day of surgery   • albuterol (PROVENTIL HFA,VENTOLIN HFA) 90 mcg/act inhaler Uses PRN- OK to take day of surgery   • alendronate (FOSAMAX) 70 mg tablet Hold day of surgery  • ascorbic acid (VITAMIN C) 500 MG tablet Hold day of surgery  • ferrous sulfate 324 (65 Fe) mg Hold day of surgery  • folic acid (FOLVITE) 1 mg tablet Hold day of surgery  • hydroxychloroquine (PLAQUENIL) 200 mg tablet Take day of surgery  • Multiple Vitamin (MULTI-VITAMIN) tablet Hold day of surgery  • naproxen (NAPROSYN) 500 mg tablet Stop taking 7 days prior to surgery  Pt denies fever, sob, sore throat and cough  Pt verbalized understanding of shower, CHG cloth, IS and med instructions  Pt instructed to stop nsaids one week prior to surgery and to continue vitamins prescribed the surgeon

## 2023-01-20 ENCOUNTER — ANESTHESIA EVENT (OUTPATIENT)
Dept: PERIOP | Facility: HOSPITAL | Age: 58
End: 2023-01-20

## 2023-01-20 LAB
ATRIAL RATE: 61 BPM
P AXIS: -2 DEGREES
PR INTERVAL: 178 MS
QRS AXIS: -41 DEGREES
QRSD INTERVAL: 128 MS
QT INTERVAL: 454 MS
QTC INTERVAL: 457 MS
T WAVE AXIS: -4 DEGREES
VENTRICULAR RATE: 61 BPM

## 2023-01-23 ENCOUNTER — TELEPHONE (OUTPATIENT)
Dept: OBGYN CLINIC | Facility: HOSPITAL | Age: 58
End: 2023-01-23

## 2023-01-23 DIAGNOSIS — M17.12 PRIMARY OSTEOARTHRITIS OF LEFT KNEE: Primary | ICD-10-CM

## 2023-01-23 NOTE — TELEPHONE ENCOUNTER
Caller: patient  Doctor: Miryam Meza    Reason for call: would like to spk to [facility] [place]    Call back # [phone]

## 2023-01-23 NOTE — TELEPHONE ENCOUNTER
Called and spoke to patient  She wanted to make sure her PCP faxed over the office notes for her medical clearance  Can you make sure we received them? She forgot to give the PCP our form to fill out so her PCP was just going to fax the office notes  Thanks

## 2023-01-25 NOTE — TELEPHONE ENCOUNTER
New script placed with specification  Please let her know  Thanks  Surgery data letter updated in chart.

## 2023-01-25 NOTE — TELEPHONE ENCOUNTER
Caller: Patient    Doctor: Parish Nava    Reason for call: Patient called in requesting to speak with Edel Sainz  Patient stated that the script that she was given for her commode has to say that there is not a 1st floor bathroom so it is medically necessary for her to get the commode    Call back#: [phone]

## 2023-01-30 ENCOUNTER — EVALUATION (OUTPATIENT)
Dept: PHYSICAL THERAPY | Facility: CLINIC | Age: 58
End: 2023-01-30

## 2023-01-30 DIAGNOSIS — M17.12 PRIMARY OSTEOARTHRITIS OF LEFT KNEE: Primary | ICD-10-CM

## 2023-01-30 DIAGNOSIS — Z01.818 PREOP TESTING: ICD-10-CM

## 2023-01-30 NOTE — PROGRESS NOTES
PT Evaluation  and PT Discharge    Today's date: 2023  Patient name: Abeba Patton  : 1965  MRN: [id number]  Referring provider: Arnoldo Maya PA-C  Dx:   Encounter Diagnosis     ICD-10-CM    1  Primary osteoarthritis of left knee  M17 [id number] Ambulatory referral to Physical Therapy      2  Preop testing  Z01 [id number] Ambulatory referral to Physical Therapy                     Assessment  Assessment details: Abeba Patton is a 56 y o  female presenting to outpatient physical [facility] at [facility] with complaints of l knee pain, pre-op testing for TKA   They present with decreased range of motion, decreased strength, limited flexibility, poor postural awareness, poor body mechanics, poor balance, decreased tolerance to activity and decreased functional mobility due to Primary osteoarthritis of left knee  (primary encounter diagnosis)  Preop testing  Talya Gip would benefit from skilled physical therapy to address noted impairments in order to allow for full functional return to work and ADL-related activities  Thank you for the referral!  Impairments: abnormal muscle firing, abnormal or restricted ROM, activity intolerance, impaired balance, impaired physical strength, lacks appropriate home exercise program, pain with function and poor body mechanics  Barriers to therapy: n/a  Understanding of Dx/Px/POC: excellent  Goals  ST   Independent with HEP in 2 weeks - met    Plan  Plan details: D/c to HEP    Patient would benefit from: skilled physical therapy  Planned modality interventions: cryotherapy, electrical stimulation/Russian stimulation and thermotherapy: hydrocollator packs  Planned therapy interventions: abdominal trunk stabilization, manual therapy, neuromuscular re-education, therapeutic activities, therapeutic exercise, body mechanics training and home exercise program  Treatment plan discussed with: patient        Subjective Evaluation    History of Present Illness  Mechanism of injury: Pt presents to PT for pre-op testing for L TKA secondary to OA  Surgery is scheduled for 02/06/2023 by Dr Shanti Rodríguez  Pain is located anterior, medial and lateral joint lines  She will occasionally walk with a SPC for stability  She denies recent falls, but does feel unsteady while walking due to weakness  She does have a fear of falling  Pain is agg with prolonged walking and standing  Rated as moderate  Eased with injections, naproxen, turmeric, ice/heat  She does not currently work, but would like to get back to working after her recovery  She used to work in housekeeping, 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  She lives with her family in a multi-level home  Currently navigating stairs one at a time  Her bedroom is on the second floor, but she plans to stay in the living room after surgery until she feels comfortable using the stairs  She is independent with all ADLs and driving  Patient Goals  Patient goals for therapy: decreased edema, decreased pain, improved balance, increased motion, return to work, return to [employer] activities, independence with ADLs/IADLs and increased strength          Objective     Observations     Additional Observation Details  Ambulating with velcro brace, pt notes a h/o ligament tear which she uses the brace for stability and pain mgmt while walking  Tenderness     Additional Tenderness Details  Swelling noted entire knee joint  SLR to 90 deg with lag secondary to ext restriction   TTP medial and lateral joint lines, adductor, ITB, distal medial HS    Active Range of Motion   Left Knee   Flexion: 115 degrees WFL  Extension: 12 degrees with pain    Passive Range of Motion   Left Knee   Extension: 10 degrees     Strength/Myotome Testing     Left Hip   Planes of Motion   Flexion: 4+  Extension: 4  Abduction: 4  Adduction: 4    Right Hip   Planes of Motion   Flexion: 4+  Extension: 4+  Abduction: 4+  Adduction: 4+    Left Knee   Flexion: 4  Extension: 4  Quadriceps contraction: fair    Functional Assessment        Comments  Gait: antalgic, SPC use, decreased L knee flexion/extension during swing and stance phases             Precautions: L TKA scheduled for 2/6/23    HEP:  Access Code: [email or web address]  URL: [email or web address]  Date: 01/30/2023  Prepared by: Ian Patel    Exercises  Seated Long Arc Quad - 20 reps  Side Lunge Adductor Stretch - 20 reps  Seated Hamstring Stretch - 20 reps        Manuals 1/30            LLE STM MONICA                                                   Neuro Re-Ed                                                                                                        Ther Ex             LAQ x20            HS stretch x20            Adductor stretch L x20                                                                             Ther Activity                                       Gait Training                                       Modalities

## 2023-01-31 ENCOUNTER — APPOINTMENT (OUTPATIENT)
Dept: RADIOLOGY | Facility: CLINIC | Age: 58
End: 2023-01-31

## 2023-01-31 ENCOUNTER — TELEPHONE (OUTPATIENT)
Dept: OBGYN CLINIC | Facility: CLINIC | Age: 58
End: 2023-01-31

## 2023-01-31 ENCOUNTER — OFFICE VISIT (OUTPATIENT)
Dept: PODIATRY | Facility: CLINIC | Age: 58
End: 2023-01-31

## 2023-01-31 VITALS
BODY MASS INDEX: 34.52 KG/M2 | DIASTOLIC BLOOD PRESSURE: 71 MMHG | SYSTOLIC BLOOD PRESSURE: 101 MMHG | WEIGHT: 160 LBS | HEIGHT: 57 IN | HEART RATE: 67 BPM

## 2023-01-31 DIAGNOSIS — S90.30XA CONTUSION OF DORSUM OF FOOT: ICD-10-CM

## 2023-01-31 DIAGNOSIS — M17.12 PRIMARY OSTEOARTHRITIS OF LEFT KNEE: Primary | ICD-10-CM

## 2023-01-31 DIAGNOSIS — M79.671 PAIN IN BOTH FEET: ICD-10-CM

## 2023-01-31 DIAGNOSIS — S86.112A RUPTURE OF POSTERIOR TIBIALIS TENDON, LEFT, INITIAL ENCOUNTER: ICD-10-CM

## 2023-01-31 DIAGNOSIS — M79.671 PAIN IN BOTH FEET: Primary | ICD-10-CM

## 2023-01-31 DIAGNOSIS — M79.672 PAIN IN BOTH FEET: Primary | ICD-10-CM

## 2023-01-31 DIAGNOSIS — M79.672 PAIN IN BOTH FEET: ICD-10-CM

## 2023-01-31 NOTE — LETTER
February 2, 2023     Fabiana Romero  [address] [facility] [id number]    Patient: Tanna Rodriguez   YOB: 1965   Date of Visit: 1/31/2023       Dear Dr Bolivar Andres: Thank you for referring Miguel A Arreola to me for evaluation  Below are my notes for this consultation  If you have questions, please do not hesitate to call me  I look forward to following your patient along with you  Sincerely,        Kenroy Campos DPM        CC: No Recipients  [facility]  2/1/2023 10:43 PM  Signed  Assessment/Plan:    Pain in both feet  -     X-ray foot left 3+ views; Future  -     X-ray foot right 3+ views; Future    Rupture of posterior tibialis tendon, left, initial encounter  -     X-ray foot left 3+ views; Future    Contusion of dorsum of foot      Plan:  · X-ray left foot weightbearing to evaluate sagittal plane structure in anticipation of possible posterior tibial tendon repair with arthrodesis  · X-ray both feet to evaluate injury sustained from window dropping on feet  · Ice and anti-inflammatories as needed for pain/discomfort  · MRI report and images personally reviewed showing ruptured left posterior tibial tendon  · X-rays personally reviewed  Left foot shows severe pes planus with sagittal plane collapse at the talonavicular joint  Right foot X-rays reviewed show previous talonavicular arthrodesis and calcaneal osteotomy with retained plate and hardware  No evidence of fracture bilateral feet dorsal midfoot  Await final radiology interpretation  · Recommend conservative treatment plan for now with patient follow-up in 3 months after her knee replacement to discuss other treatment options for her left foot  Consider CAM Walker boot immobilization though this may impair her knee rehab  Subjective:     Patient ID: Tanna Rodriguez is a 56 y o  female      70-year-old female presents with MRI disc/report for left rear foot done in [place], documents rupture of left posterior tibial tendon where she has most of her pain/discomfort in her left foot  Secondary concern of dropping a glass window on top of both feet at the same time 3 weeks ago which has resulted in some pain on the top of both feet  Reports also having surgery on her right foot for a tendon rupture in the past and has some retained hardware  Patient is scheduled to have a total knee replacement on the left side within the next 4 to 6 weeks with Dr Blanca Dhillon  The following portions of the patient's history were reviewed and updated as appropriate: allergies, current medications, past family history, past medical history, past social history, past surgical history and problem list   Past Medical History:   Diagnosis Date   • Asthma    • Lupus ([address] )      Current Outpatient Medications on File Prior to Visit   Medication Sig Dispense Refill   • albuterol (2 5 mg/3 mL) 0 083 % nebulizer solution Take 2 5 mg by nebulization every 6 (six) hours as needed for wheezing     • albuterol (PROVENTIL HFA,VENTOLIN HFA) 90 mcg/act inhaler Inhale 2 puffs every 4 (four) hours as needed for wheezing     • alendronate (FOSAMAX) 70 mg tablet every 7 days     • ascorbic acid (VITAMIN C) 500 MG tablet Take 1 tablet (500 mg total) by mouth 2 (two) times a day 60 tablet 2   • ferrous sulfate 324 (65 Fe) mg Take 1 tablet (324 mg total) by mouth 2 (two) times a day before meals 60 tablet 2   • folic acid (FOLVITE) 1 mg tablet Take 1 tablet (1 mg total) by mouth daily 30 tablet 2   • hydroxychloroquine (PLAQUENIL) 200 mg tablet Take 200 mg by mouth daily with breakfast     • Multiple Vitamin (MULTI-VITAMIN) tablet Take 1 tablet by mouth daily 30 tablet 2   • naproxen (NAPROSYN) 500 mg tablet Take 1 tablet (500 mg total) by mouth 2 (two) times a day with meals 60 tablet 0     No current facility-administered medications on file prior to visit       Past Surgical History:   Procedure Laterality Date   • FOOT SURGERY Right     Talonavicular arthrodesis with calcaneal osteotomy     No Known Allergies        Review of Systems   Constitutional: Negative for chills and fever  HENT: Negative for ear pain and sore throat  Eyes: Negative for pain and visual disturbance  Respiratory: Negative for cough and shortness of breath  Cardiovascular: Negative for chest pain and palpitations  Gastrointestinal: Negative for abdominal pain and vomiting  Genitourinary: Negative for dysuria and hematuria  Musculoskeletal: Negative for arthralgias and back pain  Pain localized to medial left rear foot and the top of both feet   Skin: Negative for color change and rash  Neurological: Negative for seizures and syncope  All other systems reviewed and are negative  Objective:      /71 (BP Location: Left arm, Patient Position: Sitting, Cuff Size: Adult)   Pulse 67   Ht 4' 9" (1 448 m) Comment: verbal  Wt 72 6 kg (160 lb)   BMI 34 62 kg/m²         Physical Exam  Constitutional:       Appearance: Normal appearance  HENT:      Head: Normocephalic  Right Ear: Tympanic membrane normal       Left Ear: Tympanic membrane normal       Nose: No congestion  Mouth/Throat:      Mouth: Mucous membranes are moist       Pharynx: No oropharyngeal exudate or posterior oropharyngeal erythema  Eyes:      Extraocular Movements: Extraocular movements intact  Conjunctiva/sclera: Conjunctivae normal       Pupils: Pupils are equal, round, and reactive to light  Cardiovascular:      Rate and Rhythm: Normal rate and regular rhythm  Pulses:           Dorsalis pedis pulses are 2+ on the right side and 2+ on the left side  Posterior tibial pulses are 0 on the right side and 0 on the left side  Pulmonary:      Effort: Pulmonary effort is normal    Abdominal:      Palpations: Abdomen is soft  Musculoskeletal:         General: Swelling, tenderness and signs of injury present  Right foot: Decreased range of motion        Left foot: Decreased range of motion  Comments: Moderate pain with palpation medial left rear foot from navicular tuberosity coursing along the posterior tibial tendon around the medial malleolus  Localized edema palpable  Pain with active muscle testing  Generalized pain across the top of both feet secondary to a window falling on secondary to injury, no appreciable ecchymosis or edema noted  Feet:      Right foot:      Protective Sensation: 10 sites tested  10 sites sensed  Skin integrity: Skin integrity normal       Left foot:      Protective Sensation: 10 sites tested  10 sites sensed  Skin integrity: Skin integrity normal    Skin:     General: Skin is warm and dry  Capillary Refill: Capillary refill takes 2 to 3 seconds  Coloration: Skin is not pale  Findings: No bruising, erythema, lesion or rash  Neurological:      General: No focal deficit present  Mental Status: She is alert  Cranial Nerves: No cranial nerve deficit  Sensory: No sensory deficit  Motor: No weakness        Gait: Gait normal       Deep Tendon Reflexes: Reflexes normal    Psychiatric:         Mood and Affect: Mood normal          Behavior: Behavior normal          Judgment: Judgment normal

## 2023-01-31 NOTE — TELEPHONE ENCOUNTER
[facility] is requiring the office note have documentation stating, that there is no first floor bathroom and it's medically necessary patient receives a commode  They will not accept this on the script itself   Fax [phone]

## 2023-01-31 NOTE — PROGRESS NOTES
Assessment/Plan:    Pain in both feet  -     X-ray foot left 3+ views; Future  -     X-ray foot right 3+ views; Future    Rupture of posterior tibialis tendon, left, initial encounter  -     X-ray foot left 3+ views; Future    Contusion of dorsum of foot      Plan:  X-ray left foot weightbearing to evaluate sagittal plane structure in anticipation of possible posterior tibial tendon repair with arthrodesis  X-ray both feet to evaluate injury sustained from window dropping on feet  Ice and anti-inflammatories as needed for pain/discomfort  MRI report and images personally reviewed showing ruptured left posterior tibial tendon  X-rays personally reviewed  Left foot shows severe pes planus with sagittal plane collapse at the talonavicular joint  Right foot X-rays reviewed show previous talonavicular arthrodesis and calcaneal osteotomy with retained plate and hardware  No evidence of fracture bilateral feet dorsal midfoot  Await final radiology interpretation  Recommend conservative treatment plan for now with patient follow-up in 3 months after her knee replacement to discuss other treatment options for her left foot  Consider CAM Walker boot immobilization though this may impair her knee rehab  Subjective:      Patient ID: Funmi Otoole is a 56 y o  female  82-year-old female presents with MRI disc/report for left rear foot done in [place], documents rupture of left posterior tibial tendon where she has most of her pain/discomfort in her left foot  Secondary concern of dropping a glass window on top of both feet at the same time 3 weeks ago which has resulted in some pain on the top of both feet  Reports also having surgery on her right foot for a tendon rupture in the past and has some retained hardware  Patient is scheduled to have a total knee replacement on the left side within the next 4 to 6 weeks with Dr Alberto Rosario        The following portions of the patient's history were reviewed and updated as appropriate: allergies, current medications, past family history, past medical history, past social history, past surgical history and problem list   Past Medical History:   Diagnosis Date   • Asthma    • Lupus ([address] )      Current Outpatient Medications on File Prior to Visit   Medication Sig Dispense Refill   • albuterol (2 5 mg/3 mL) 0 083 % nebulizer solution Take 2 5 mg by nebulization every 6 (six) hours as needed for wheezing     • albuterol (PROVENTIL HFA,VENTOLIN HFA) 90 mcg/act inhaler Inhale 2 puffs every 4 (four) hours as needed for wheezing     • alendronate (FOSAMAX) 70 mg tablet every 7 days     • ascorbic acid (VITAMIN C) 500 MG tablet Take 1 tablet (500 mg total) by mouth 2 (two) times a day 60 tablet 2   • ferrous sulfate 324 (65 Fe) mg Take 1 tablet (324 mg total) by mouth 2 (two) times a day before meals 60 tablet 2   • folic acid (FOLVITE) 1 mg tablet Take 1 tablet (1 mg total) by mouth daily 30 tablet 2   • hydroxychloroquine (PLAQUENIL) 200 mg tablet Take 200 mg by mouth daily with breakfast     • Multiple Vitamin (MULTI-VITAMIN) tablet Take 1 tablet by mouth daily 30 tablet 2   • naproxen (NAPROSYN) 500 mg tablet Take 1 tablet (500 mg total) by mouth 2 (two) times a day with meals 60 tablet 0     No current facility-administered medications on file prior to visit  Past Surgical History:   Procedure Laterality Date   • FOOT SURGERY Right     Talonavicular arthrodesis with calcaneal osteotomy     No Known Allergies        Review of Systems   Constitutional: Negative for chills and fever  HENT: Negative for ear pain and sore throat  Eyes: Negative for pain and visual disturbance  Respiratory: Negative for cough and shortness of breath  Cardiovascular: Negative for chest pain and palpitations  Gastrointestinal: Negative for abdominal pain and vomiting  Genitourinary: Negative for dysuria and hematuria  Musculoskeletal: Negative for arthralgias and back pain  Pain localized to medial left rear foot and the top of both feet   Skin: Negative for color change and rash  Neurological: Negative for seizures and syncope  All other systems reviewed and are negative  Objective:      /71 (BP Location: Left arm, Patient Position: Sitting, Cuff Size: Adult)   Pulse 67   Ht 4' 9" (1 448 m) Comment: verbal  Wt 72 6 kg (160 lb)   BMI 34 62 kg/m²          Physical Exam  Constitutional:       Appearance: Normal appearance  HENT:      Head: Normocephalic  Right Ear: Tympanic membrane normal       Left Ear: Tympanic membrane normal       Nose: No congestion  Mouth/Throat:      Mouth: Mucous membranes are moist       Pharynx: No oropharyngeal exudate or posterior oropharyngeal erythema  Eyes:      Extraocular Movements: Extraocular movements intact  Conjunctiva/sclera: Conjunctivae normal       Pupils: Pupils are equal, round, and reactive to light  Cardiovascular:      Rate and Rhythm: Normal rate and regular rhythm  Pulses:           Dorsalis pedis pulses are 2+ on the right side and 2+ on the left side  Posterior tibial pulses are 0 on the right side and 0 on the left side  Pulmonary:      Effort: Pulmonary effort is normal    Abdominal:      Palpations: Abdomen is soft  Musculoskeletal:         General: Swelling, tenderness and signs of injury present  Right foot: Decreased range of motion  Left foot: Decreased range of motion  Comments: Moderate pain with palpation medial left rear foot from navicular tuberosity coursing along the posterior tibial tendon around the medial malleolus  Localized edema palpable  Pain with active muscle testing  Generalized pain across the top of both feet secondary to a window falling on secondary to injury, no appreciable ecchymosis or edema noted  Feet:      Right foot:      Protective Sensation: 10 sites tested  10 sites sensed        Skin integrity: Skin integrity normal  Left foot:      Protective Sensation: 10 sites tested  10 sites sensed  Skin integrity: Skin integrity normal    Skin:     General: Skin is warm and dry  Capillary Refill: Capillary refill takes 2 to 3 seconds  Coloration: Skin is not pale  Findings: No bruising, erythema, lesion or rash  Neurological:      General: No focal deficit present  Mental Status: She is alert  Cranial Nerves: No cranial nerve deficit  Sensory: No sensory deficit  Motor: No weakness        Gait: Gait normal       Deep Tendon Reflexes: Reflexes normal    Psychiatric:         Mood and Affect: Mood normal          Behavior: Behavior normal          Judgment: Judgment normal

## 2023-02-03 ENCOUNTER — TELEPHONE (OUTPATIENT)
Dept: OBGYN CLINIC | Facility: HOSPITAL | Age: 58
End: 2023-02-03

## 2023-02-03 NOTE — TELEPHONE ENCOUNTER
Caller: Capri    Doctor: Blanca Dhillon    Reason for call: wants script to be re-faxed to [facility] for Commode, patient will call to make sure it was received  I did refax script today    Fax: [phone]    Call back#: [phone]

## 2023-02-03 NOTE — TELEPHONE ENCOUNTER
My new order said bedside commode  Dr Roni Mendez already ammended his last office note to say what they need  Please fax to them  Thanks

## 2023-02-03 NOTE — TELEPHONE ENCOUNTER
[facility]    Doctor: Alberto Rosario    Reason for call: For the patient to receive her commode, the order needs to state "bedside commode"  They also need the last office note faxed over with the order and the office visit needs to state "patient has no bathroom facilities on her first floor"  Please fax to number below  Thank you!     Call back#: [phone]    Fax#:  [phone] Normal for race

## 2023-02-03 NOTE — TELEPHONE ENCOUNTER
Caller:  patient    Doctor: Isaura Mejia     Reason for call: patient called for the status of the commode prescription being faxed    Call got disconnected    Call back#:

## 2023-02-05 NOTE — ANESTHESIA PREPROCEDURE EVALUATION
Procedure:  ARTHROPLASTY KNEE TOTAL (Left: Knee)    Relevant Problems   MUSCULOSKELETAL   (+) Primary osteoarthritis of left knee   (+) Primary osteoarthritis of right knee      Asthma Lupus (HCC)   Both Stable, No recent Bronchdilators  Prosthetic Eye Left since birth  Physical Exam    Airway    Mallampati score: III  TM Distance: >3 FB  Neck ROM: full     Dental   Comment: Multiple Missing Teeth,     Cardiovascular      Pulmonary  Breath sounds clear to auscultation,     Other Findings        Anesthesia Plan  ASA Score- 2     Anesthesia Type- spinal with ASA Monitors  Additional Monitors:   Airway Plan:     Comment: Adductor Canal Block Planned  Plan Factors-Exercise tolerance (METS): >4 METS  Chart reviewed  EKG reviewed  Existing labs reviewed  Patient is a current smoker (4cigs/day)  Patient did not smoke on day of surgery  Induction-     Postoperative Plan-     Informed Consent- Anesthetic plan and risks discussed with patient  I personally reviewed this patient with the CRNA  Discussed and agreed on the Anesthesia Plan with the CRNA  Raul Pradhan Spinal technique discussed with Patient   Discussed side effects including post dural puncture headache with patient  All questions answered  Consent given  Discussed with Patient the procedure for Adductor Canal Block, side effects including extended numbness of the extremity and potential for an incomplete Block  All questions answered  Consent given      Lab Results   Component Value Date    GLUF 89 01/17/2023    ALT 22 01/17/2023    AST 14 01/17/2023    BUN 13 01/17/2023    CALCIUM 9 4 01/17/2023     (H) 01/17/2023    CO2 25 01/17/2023    CREATININE 0 52 (L) 01/17/2023    INR 0 85 01/17/2023    HCT 39 3 01/17/2023    HGB 13 0 01/17/2023    HGBA1C 5 3 01/17/2023     01/17/2023    K 3 9 01/17/2023    WBC 5 77 01/17/2023

## 2023-02-06 ENCOUNTER — ANESTHESIA (OUTPATIENT)
Dept: PERIOP | Facility: HOSPITAL | Age: 58
End: 2023-02-06

## 2023-02-06 ENCOUNTER — HOSPITAL ENCOUNTER (INPATIENT)
Facility: HOSPITAL | Age: 58
LOS: 1 days | Discharge: NURSING, REHAB OR LONG-TERM CARE | End: 2023-02-09
Attending: ORTHOPAEDIC SURGERY | Admitting: ORTHOPAEDIC SURGERY

## 2023-02-06 DIAGNOSIS — M17.12 PRIMARY OSTEOARTHRITIS OF LEFT KNEE: Primary | ICD-10-CM

## 2023-02-06 LAB
ABO GROUP BLD: NORMAL
ANION GAP SERPL CALCULATED.3IONS-SCNC: 5 MMOL/L (ref 4–13)
BLD GP AB SCN SERPL QL: NEGATIVE
BUN SERPL-MCNC: 7 MG/DL (ref 5–25)
CALCIUM SERPL-MCNC: 8.2 MG/DL (ref 8.3–10.1)
CHLORIDE SERPL-SCNC: 105 MMOL/L (ref 96–108)
CO2 SERPL-SCNC: 29 MMOL/L (ref 21–32)
CREAT SERPL-MCNC: 0.55 MG/DL (ref 0.6–1.3)
EXT PREGNANCY TEST URINE: NEGATIVE
EXT. CONTROL: NORMAL
GFR SERPL CREATININE-BSD FRML MDRD: 105 ML/MIN/1.73SQ M
GLUCOSE SERPL-MCNC: 140 MG/DL (ref 65–140)
PLATELET # BLD AUTO: 275 THOUSANDS/UL (ref 149–390)
PMV BLD AUTO: 9.3 FL (ref 8.9–12.7)
POTASSIUM SERPL-SCNC: 3.9 MMOL/L (ref 3.5–5.3)
RH BLD: POSITIVE
SODIUM SERPL-SCNC: 139 MMOL/L (ref 135–147)
SPECIMEN EXPIRATION DATE: NORMAL

## 2023-02-06 PROCEDURE — 0SRD0J9 REPLACEMENT OF LEFT KNEE JOINT WITH SYNTHETIC SUBSTITUTE, CEMENTED, OPEN APPROACH: ICD-10-PCS | Performed by: ORTHOPAEDIC SURGERY

## 2023-02-06 DEVICE — ATTUNE KNEE SYSTEM TIBIAL BASE ROTATING PLATFORM SIZE 4 CEMENTED
Type: IMPLANTABLE DEVICE | Site: KNEE | Status: FUNCTIONAL
Brand: ATTUNE

## 2023-02-06 DEVICE — ATTUNE KNEE SYSTEM FEMORAL POSTERIOR STABILIZED NARROW SIZE 4N LEFT CEMENTED
Type: IMPLANTABLE DEVICE | Site: KNEE | Status: FUNCTIONAL
Brand: ATTUNE

## 2023-02-06 DEVICE — ATTUNE KNEE SYSTEM TIBIAL INSERT ROTATING PLATFORM POSTERIOR STABILIZED 4 6MM AOX
Type: IMPLANTABLE DEVICE | Site: KNEE | Status: FUNCTIONAL
Brand: ATTUNE

## 2023-02-06 DEVICE — ATTUNE PATELLA MEDIALIZED DOME 35MM CEMENTED AOX
Type: IMPLANTABLE DEVICE | Site: PATELLA | Status: FUNCTIONAL
Brand: ATTUNE

## 2023-02-06 DEVICE — SMARTSET HIGH PERFORMANCE MV MEDIUM VISCOSITY BONE CEMENT 40G
Type: IMPLANTABLE DEVICE | Site: KNEE | Status: FUNCTIONAL
Brand: SMARTSET

## 2023-02-06 RX ORDER — PROPOFOL 10 MG/ML
INJECTION, EMULSION INTRAVENOUS AS NEEDED
Status: DISCONTINUED | OUTPATIENT
Start: 2023-02-06 | End: 2023-02-06

## 2023-02-06 RX ORDER — ENOXAPARIN SODIUM 100 MG/ML
30 INJECTION SUBCUTANEOUS EVERY 12 HOURS SCHEDULED
Status: DISCONTINUED | OUTPATIENT
Start: 2023-02-06 | End: 2023-02-09 | Stop reason: HOSPADM

## 2023-02-06 RX ORDER — FENTANYL CITRATE 50 UG/ML
INJECTION, SOLUTION INTRAMUSCULAR; INTRAVENOUS AS NEEDED
Status: DISCONTINUED | OUTPATIENT
Start: 2023-02-06 | End: 2023-02-06

## 2023-02-06 RX ORDER — ALBUTEROL SULFATE 2.5 MG/3ML
2.5 SOLUTION RESPIRATORY (INHALATION) EVERY 6 HOURS PRN
Status: DISCONTINUED | OUTPATIENT
Start: 2023-02-06 | End: 2023-02-09 | Stop reason: HOSPADM

## 2023-02-06 RX ORDER — BUPIVACAINE HYDROCHLORIDE 7.5 MG/ML
INJECTION, SOLUTION INTRASPINAL AS NEEDED
Status: DISCONTINUED | OUTPATIENT
Start: 2023-02-06 | End: 2023-02-06

## 2023-02-06 RX ORDER — CHLORHEXIDINE GLUCONATE 0.12 MG/ML
15 RINSE ORAL ONCE
Status: COMPLETED | OUTPATIENT
Start: 2023-02-06 | End: 2023-02-06

## 2023-02-06 RX ORDER — PROPOFOL 10 MG/ML
INJECTION, EMULSION INTRAVENOUS CONTINUOUS PRN
Status: DISCONTINUED | OUTPATIENT
Start: 2023-02-06 | End: 2023-02-06

## 2023-02-06 RX ORDER — ACETAMINOPHEN 325 MG/1
975 TABLET ORAL EVERY 8 HOURS
Status: DISCONTINUED | OUTPATIENT
Start: 2023-02-06 | End: 2023-02-09 | Stop reason: HOSPADM

## 2023-02-06 RX ORDER — ONDANSETRON 2 MG/ML
4 INJECTION INTRAMUSCULAR; INTRAVENOUS EVERY 4 HOURS PRN
Status: DISCONTINUED | OUTPATIENT
Start: 2023-02-06 | End: 2023-02-09 | Stop reason: HOSPADM

## 2023-02-06 RX ORDER — ASCORBIC ACID 500 MG
500 TABLET ORAL 2 TIMES DAILY
Status: DISCONTINUED | OUTPATIENT
Start: 2023-02-06 | End: 2023-02-09 | Stop reason: HOSPADM

## 2023-02-06 RX ORDER — OXYCODONE HYDROCHLORIDE 5 MG/1
10 TABLET ORAL EVERY 4 HOURS PRN
Status: DISCONTINUED | OUTPATIENT
Start: 2023-02-06 | End: 2023-02-09 | Stop reason: HOSPADM

## 2023-02-06 RX ORDER — MIDAZOLAM HYDROCHLORIDE 2 MG/2ML
INJECTION, SOLUTION INTRAMUSCULAR; INTRAVENOUS AS NEEDED
Status: DISCONTINUED | OUTPATIENT
Start: 2023-02-06 | End: 2023-02-06

## 2023-02-06 RX ORDER — CEFAZOLIN SODIUM 2 G/50ML
2000 SOLUTION INTRAVENOUS EVERY 8 HOURS
Status: COMPLETED | OUTPATIENT
Start: 2023-02-06 | End: 2023-02-07

## 2023-02-06 RX ORDER — TRANEXAMIC ACID 100 MG/ML
INJECTION, SOLUTION INTRAVENOUS AS NEEDED
Status: DISCONTINUED | OUTPATIENT
Start: 2023-02-06 | End: 2023-02-06

## 2023-02-06 RX ORDER — LIDOCAINE HYDROCHLORIDE 10 MG/ML
INJECTION, SOLUTION EPIDURAL; INFILTRATION; INTRACAUDAL; PERINEURAL AS NEEDED
Status: DISCONTINUED | OUTPATIENT
Start: 2023-02-06 | End: 2023-02-06

## 2023-02-06 RX ORDER — BUPIVACAINE HYDROCHLORIDE 5 MG/ML
INJECTION, SOLUTION EPIDURAL; INTRACAUDAL AS NEEDED
Status: DISCONTINUED | OUTPATIENT
Start: 2023-02-06 | End: 2023-02-06

## 2023-02-06 RX ORDER — DEXAMETHASONE SODIUM PHOSPHATE 10 MG/ML
INJECTION, SOLUTION INTRAMUSCULAR; INTRAVENOUS AS NEEDED
Status: DISCONTINUED | OUTPATIENT
Start: 2023-02-06 | End: 2023-02-06

## 2023-02-06 RX ORDER — PROMETHAZINE HYDROCHLORIDE 25 MG/ML
25 INJECTION, SOLUTION INTRAMUSCULAR; INTRAVENOUS ONCE AS NEEDED
Status: DISCONTINUED | OUTPATIENT
Start: 2023-02-06 | End: 2023-02-06 | Stop reason: HOSPADM

## 2023-02-06 RX ORDER — ALBUTEROL SULFATE 90 UG/1
2 AEROSOL, METERED RESPIRATORY (INHALATION) EVERY 4 HOURS PRN
Status: DISCONTINUED | OUTPATIENT
Start: 2023-02-06 | End: 2023-02-09 | Stop reason: HOSPADM

## 2023-02-06 RX ORDER — TRAMADOL HYDROCHLORIDE 50 MG/1
50 TABLET ORAL EVERY 6 HOURS SCHEDULED
Status: DISCONTINUED | OUTPATIENT
Start: 2023-02-06 | End: 2023-02-09 | Stop reason: HOSPADM

## 2023-02-06 RX ORDER — SODIUM CHLORIDE, SODIUM LACTATE, POTASSIUM CHLORIDE, CALCIUM CHLORIDE 600; 310; 30; 20 MG/100ML; MG/100ML; MG/100ML; MG/100ML
50 INJECTION, SOLUTION INTRAVENOUS CONTINUOUS
Status: DISCONTINUED | OUTPATIENT
Start: 2023-02-06 | End: 2023-02-06

## 2023-02-06 RX ORDER — HYDROXYCHLOROQUINE SULFATE 200 MG/1
200 TABLET, FILM COATED ORAL
Status: DISCONTINUED | OUTPATIENT
Start: 2023-02-07 | End: 2023-02-09 | Stop reason: HOSPADM

## 2023-02-06 RX ORDER — CYCLOBENZAPRINE HCL 5 MG
5 TABLET ORAL 3 TIMES DAILY
Status: DISCONTINUED | OUTPATIENT
Start: 2023-02-06 | End: 2023-02-09 | Stop reason: HOSPADM

## 2023-02-06 RX ORDER — FENTANYL CITRATE/PF 50 MCG/ML
25 SYRINGE (ML) INJECTION
Status: DISCONTINUED | OUTPATIENT
Start: 2023-02-06 | End: 2023-02-06 | Stop reason: HOSPADM

## 2023-02-06 RX ORDER — CEFAZOLIN SODIUM 2 G/50ML
2000 SOLUTION INTRAVENOUS ONCE
Status: COMPLETED | OUTPATIENT
Start: 2023-02-06 | End: 2023-02-06

## 2023-02-06 RX ORDER — FOLIC ACID 1 MG/1
1 TABLET ORAL DAILY
Status: DISCONTINUED | OUTPATIENT
Start: 2023-02-06 | End: 2023-02-09 | Stop reason: HOSPADM

## 2023-02-06 RX ORDER — SODIUM CHLORIDE, SODIUM LACTATE, POTASSIUM CHLORIDE, CALCIUM CHLORIDE 600; 310; 30; 20 MG/100ML; MG/100ML; MG/100ML; MG/100ML
INJECTION, SOLUTION INTRAVENOUS CONTINUOUS PRN
Status: DISCONTINUED | OUTPATIENT
Start: 2023-02-06 | End: 2023-02-06

## 2023-02-06 RX ORDER — MAGNESIUM HYDROXIDE/ALUMINUM HYDROXICE/SIMETHICONE 120; 1200; 1200 MG/30ML; MG/30ML; MG/30ML
15 SUSPENSION ORAL EVERY 6 HOURS PRN
Status: DISCONTINUED | OUTPATIENT
Start: 2023-02-06 | End: 2023-02-09 | Stop reason: HOSPADM

## 2023-02-06 RX ORDER — ONDANSETRON 2 MG/ML
INJECTION INTRAMUSCULAR; INTRAVENOUS AS NEEDED
Status: DISCONTINUED | OUTPATIENT
Start: 2023-02-06 | End: 2023-02-06

## 2023-02-06 RX ORDER — SENNOSIDES 8.6 MG
1 TABLET ORAL DAILY
Status: DISCONTINUED | OUTPATIENT
Start: 2023-02-06 | End: 2023-02-09 | Stop reason: HOSPADM

## 2023-02-06 RX ORDER — CHLORHEXIDINE GLUCONATE 4 G/100ML
SOLUTION TOPICAL DAILY PRN
Status: DISCONTINUED | OUTPATIENT
Start: 2023-02-06 | End: 2023-02-06

## 2023-02-06 RX ORDER — BISACODYL 10 MG
10 SUPPOSITORY, RECTAL RECTAL DAILY PRN
Status: DISCONTINUED | OUTPATIENT
Start: 2023-02-06 | End: 2023-02-09 | Stop reason: HOSPADM

## 2023-02-06 RX ORDER — DOCUSATE SODIUM 100 MG/1
100 CAPSULE, LIQUID FILLED ORAL 2 TIMES DAILY
Status: DISCONTINUED | OUTPATIENT
Start: 2023-02-06 | End: 2023-02-09 | Stop reason: HOSPADM

## 2023-02-06 RX ORDER — ALBUMIN, HUMAN INJ 5% 5 %
12.5 SOLUTION INTRAVENOUS ONCE
Status: COMPLETED | OUTPATIENT
Start: 2023-02-06 | End: 2023-02-07

## 2023-02-06 RX ORDER — SODIUM CHLORIDE, SODIUM LACTATE, POTASSIUM CHLORIDE, CALCIUM CHLORIDE 600; 310; 30; 20 MG/100ML; MG/100ML; MG/100ML; MG/100ML
100 INJECTION, SOLUTION INTRAVENOUS CONTINUOUS
Status: DISCONTINUED | OUTPATIENT
Start: 2023-02-06 | End: 2023-02-09

## 2023-02-06 RX ORDER — OXYCODONE HYDROCHLORIDE 5 MG/1
5 TABLET ORAL EVERY 4 HOURS PRN
Status: DISCONTINUED | OUTPATIENT
Start: 2023-02-06 | End: 2023-02-09 | Stop reason: HOSPADM

## 2023-02-06 RX ORDER — PANTOPRAZOLE SODIUM 40 MG/1
40 TABLET, DELAYED RELEASE ORAL DAILY
Status: DISCONTINUED | OUTPATIENT
Start: 2023-02-06 | End: 2023-02-09 | Stop reason: HOSPADM

## 2023-02-06 RX ORDER — FERROUS SULFATE 325(65) MG
325 TABLET ORAL 2 TIMES DAILY WITH MEALS
Status: DISCONTINUED | OUTPATIENT
Start: 2023-02-06 | End: 2023-02-09 | Stop reason: HOSPADM

## 2023-02-06 RX ORDER — HYDROMORPHONE HCL IN WATER/PF 6 MG/30 ML
0.2 PATIENT CONTROLLED ANALGESIA SYRINGE INTRAVENOUS
Status: DISCONTINUED | OUTPATIENT
Start: 2023-02-06 | End: 2023-02-06 | Stop reason: HOSPADM

## 2023-02-06 RX ORDER — METOCLOPRAMIDE HYDROCHLORIDE 5 MG/ML
10 INJECTION INTRAMUSCULAR; INTRAVENOUS ONCE AS NEEDED
Status: DISCONTINUED | OUTPATIENT
Start: 2023-02-06 | End: 2023-02-06 | Stop reason: HOSPADM

## 2023-02-06 RX ORDER — GABAPENTIN 100 MG/1
100 CAPSULE ORAL EVERY 8 HOURS
Status: DISCONTINUED | OUTPATIENT
Start: 2023-02-06 | End: 2023-02-09 | Stop reason: HOSPADM

## 2023-02-06 RX ORDER — MAGNESIUM HYDROXIDE 1200 MG/15ML
LIQUID ORAL AS NEEDED
Status: DISCONTINUED | OUTPATIENT
Start: 2023-02-06 | End: 2023-02-06 | Stop reason: HOSPADM

## 2023-02-06 RX ORDER — SIMETHICONE 80 MG
80 TABLET,CHEWABLE ORAL 4 TIMES DAILY PRN
Status: DISCONTINUED | OUTPATIENT
Start: 2023-02-06 | End: 2023-02-09 | Stop reason: HOSPADM

## 2023-02-06 RX ORDER — CALCIUM CARBONATE 200(500)MG
1000 TABLET,CHEWABLE ORAL DAILY PRN
Status: DISCONTINUED | OUTPATIENT
Start: 2023-02-06 | End: 2023-02-09 | Stop reason: HOSPADM

## 2023-02-06 RX ADMIN — CALCIUM CARBONATE (ANTACID) CHEW TAB 500 MG 1000 MG: 500 CHEW TAB at 17:03

## 2023-02-06 RX ADMIN — OXYCODONE HYDROCHLORIDE 5 MG: 5 TABLET ORAL at 16:29

## 2023-02-06 RX ADMIN — SODIUM CHLORIDE, SODIUM LACTATE, POTASSIUM CHLORIDE, AND CALCIUM CHLORIDE 50 ML/HR: .6; .31; .03; .02 INJECTION, SOLUTION INTRAVENOUS at 06:56

## 2023-02-06 RX ADMIN — CYCLOBENZAPRINE HYDROCHLORIDE 5 MG: 5 TABLET, FILM COATED ORAL at 20:25

## 2023-02-06 RX ADMIN — PROPOFOL 60 MCG/KG/MIN: 10 INJECTION, EMULSION INTRAVENOUS at 07:45

## 2023-02-06 RX ADMIN — ACETAMINOPHEN 975 MG: 325 TABLET, FILM COATED ORAL at 11:07

## 2023-02-06 RX ADMIN — ALBUMIN (HUMAN) 12.5 G: 12.5 INJECTION, SOLUTION INTRAVENOUS at 10:17

## 2023-02-06 RX ADMIN — TRAMADOL HYDROCHLORIDE 50 MG: 50 TABLET ORAL at 23:43

## 2023-02-06 RX ADMIN — FENTANYL CITRATE 50 MCG: 50 INJECTION, SOLUTION INTRAMUSCULAR; INTRAVENOUS at 07:12

## 2023-02-06 RX ADMIN — FOLIC ACID 1 MG: 1 TABLET ORAL at 17:02

## 2023-02-06 RX ADMIN — CEFAZOLIN SODIUM 2000 MG: 2 SOLUTION INTRAVENOUS at 10:20

## 2023-02-06 RX ADMIN — CYCLOBENZAPRINE HYDROCHLORIDE 5 MG: 5 TABLET, FILM COATED ORAL at 11:17

## 2023-02-06 RX ADMIN — SODIUM CHLORIDE, SODIUM LACTATE, POTASSIUM CHLORIDE, AND CALCIUM CHLORIDE 1000 ML: .6; .31; .03; .02 INJECTION, SOLUTION INTRAVENOUS at 22:45

## 2023-02-06 RX ADMIN — PROPOFOL 40 MG: 10 INJECTION, EMULSION INTRAVENOUS at 07:45

## 2023-02-06 RX ADMIN — STANDARDIZED SENNA CONCENTRATE 8.6 MG: 8.6 TABLET ORAL at 17:09

## 2023-02-06 RX ADMIN — CEFAZOLIN SODIUM 2000 MG: 2 SOLUTION INTRAVENOUS at 17:09

## 2023-02-06 RX ADMIN — TRANEXAMIC ACID 1 G: 100 INJECTION INTRAVENOUS at 07:55

## 2023-02-06 RX ADMIN — MIDAZOLAM 1 MG: 1 INJECTION INTRAMUSCULAR; INTRAVENOUS at 07:12

## 2023-02-06 RX ADMIN — ONDANSETRON 4 MG: 2 INJECTION INTRAMUSCULAR; INTRAVENOUS at 09:18

## 2023-02-06 RX ADMIN — GABAPENTIN 100 MG: 100 CAPSULE ORAL at 17:01

## 2023-02-06 RX ADMIN — DEXAMETHASONE SODIUM PHOSPHATE 10 MG: 10 INJECTION, SOLUTION INTRAMUSCULAR; INTRAVENOUS at 08:09

## 2023-02-06 RX ADMIN — BUPIVACAINE 20 ML: 13.3 INJECTION, SUSPENSION, LIPOSOMAL INFILTRATION at 07:16

## 2023-02-06 RX ADMIN — GABAPENTIN 100 MG: 100 CAPSULE ORAL at 11:17

## 2023-02-06 RX ADMIN — BUPIVACAINE HYDROCHLORIDE IN DEXTROSE 1.3 ML: 7.5 INJECTION, SOLUTION SUBARACHNOID at 07:55

## 2023-02-06 RX ADMIN — LIDOCAINE HYDROCHLORIDE 30 MG: 10 INJECTION, SOLUTION EPIDURAL; INFILTRATION; INTRACAUDAL; PERINEURAL at 07:45

## 2023-02-06 RX ADMIN — MULTIPLE VITAMINS W/ MINERALS TAB 1 TABLET: TAB ORAL at 17:02

## 2023-02-06 RX ADMIN — BUPIVACAINE HYDROCHLORIDE 5 ML: 5 INJECTION, SOLUTION EPIDURAL; INTRACAUDAL; PERINEURAL at 07:16

## 2023-02-06 RX ADMIN — SODIUM CHLORIDE, SODIUM LACTATE, POTASSIUM CHLORIDE, AND CALCIUM CHLORIDE 1000 ML: .6; .31; .03; .02 INJECTION, SOLUTION INTRAVENOUS at 20:25

## 2023-02-06 RX ADMIN — SODIUM CHLORIDE, SODIUM LACTATE, POTASSIUM CHLORIDE, AND CALCIUM CHLORIDE 75 ML/HR: .6; .31; .03; .02 INJECTION, SOLUTION INTRAVENOUS at 16:59

## 2023-02-06 RX ADMIN — OXYCODONE HYDROCHLORIDE 5 MG: 5 TABLET ORAL at 12:16

## 2023-02-06 RX ADMIN — ACETAMINOPHEN 975 MG: 325 TABLET, FILM COATED ORAL at 17:00

## 2023-02-06 RX ADMIN — FERROUS SULFATE TAB 325 MG (65 MG ELEMENTAL FE) 325 MG: 325 (65 FE) TAB at 17:01

## 2023-02-06 RX ADMIN — PANTOPRAZOLE SODIUM 40 MG: 40 TABLET, DELAYED RELEASE ORAL at 17:02

## 2023-02-06 RX ADMIN — TRAMADOL HYDROCHLORIDE 50 MG: 50 TABLET ORAL at 17:01

## 2023-02-06 RX ADMIN — SODIUM CHLORIDE, SODIUM LACTATE, POTASSIUM CHLORIDE, AND CALCIUM CHLORIDE: .6; .31; .03; .02 INJECTION, SOLUTION INTRAVENOUS at 07:32

## 2023-02-06 RX ADMIN — CYCLOBENZAPRINE HYDROCHLORIDE 5 MG: 5 TABLET, FILM COATED ORAL at 17:01

## 2023-02-06 RX ADMIN — CHLORHEXIDINE GLUCONATE 15 ML: 1.2 RINSE ORAL at 06:56

## 2023-02-06 RX ADMIN — DOCUSATE SODIUM 100 MG: 100 CAPSULE, LIQUID FILLED ORAL at 17:08

## 2023-02-06 RX ADMIN — SODIUM CHLORIDE, SODIUM LACTATE, POTASSIUM CHLORIDE, AND CALCIUM CHLORIDE 75 ML/HR: .6; .31; .03; .02 INJECTION, SOLUTION INTRAVENOUS at 23:43

## 2023-02-06 RX ADMIN — TRANEXAMIC ACID 1000 MG: 100 INJECTION, SOLUTION INTRAVENOUS at 07:22

## 2023-02-06 RX ADMIN — SODIUM CHLORIDE, SODIUM LACTATE, POTASSIUM CHLORIDE, AND CALCIUM CHLORIDE 75 ML/HR: .6; .31; .03; .02 INJECTION, SOLUTION INTRAVENOUS at 14:41

## 2023-02-06 RX ADMIN — TRAMADOL HYDROCHLORIDE 50 MG: 50 TABLET ORAL at 11:08

## 2023-02-06 RX ADMIN — OXYCODONE HYDROCHLORIDE AND ACETAMINOPHEN 500 MG: 500 TABLET ORAL at 17:02

## 2023-02-06 RX ADMIN — MIDAZOLAM 1 MG: 1 INJECTION INTRAMUSCULAR; INTRAVENOUS at 07:35

## 2023-02-06 RX ADMIN — CEFAZOLIN SODIUM 2000 MG: 2 SOLUTION INTRAVENOUS at 07:55

## 2023-02-06 NOTE — ANESTHESIA PROCEDURE NOTES
Peripheral Block    Patient location during procedure: holding area  Start time: 2/6/2023 7:11 AM  Reason for block: at surgeon's request and post-op pain management  Staffing  Performed: Anesthesiologist   Anesthesiologist: Trent Lipscomb MD  Preanesthetic Checklist  Completed: patient identified, IV checked, site marked, risks and benefits discussed, surgical consent, monitors and equipment checked, pre-op evaluation and timeout performed  Peripheral Block  Patient position: supine (L Thigh Abducted)  Prep: ChloraPrep  Patient monitoring: heart rate and continuous pulse ox  Block type: adductor canal block  Laterality: left  Injection technique: single-shot  Procedures: ultrasound guided, Ultrasound guidance required for the procedure to increase accuracy and safety of medication placement and decrease risk of complications    Ultrasound permanent image saved  Needle  Needle type: Stimuplex   Needle gauge: 20 G  Needle length: 10 cm  Needle localization: ultrasound guidance  Needle insertion depth: 3 cm  Assessment  Injection assessment: incremental injection, local visualized surrounding nerve on ultrasound, negative aspiration for heme and no paresthesia on injection  Paresthesia pain: none  Heart rate change: no  Slow fractionated injection: yes  Post-procedure:  site cleaned  patient tolerated the procedure well with no immediate complications

## 2023-02-06 NOTE — OCCUPATIONAL THERAPY NOTE
Occupational Therapy Evaluation     Patient Name: Anastasiya PINTO'S Date: 2/6/2023  Problem List  Active Problems:    * No active hospital problems  *    Past Medical History  Past Medical History:   Diagnosis Date    Asthma     Lupus ([address] )      Past Surgical History  Past Surgical History:   Procedure Laterality Date    FOOT SURGERY Right     Talonavicular arthrodesis with calcaneal osteotomy         02/06/23 1401   OT Last Visit   OT Visit Date 02/06/23   Note Type   Note type Evaluation   Pain Assessment   Pain Assessment Tool 0-10   Pain Score 8   Pain Location/Orientation Orientation: Left; Location: Knee   Hospital Pain Intervention(s) Rest;Repositioned   Restrictions/Precautions   Weight Bearing Precautions Per Order Yes   LLE Weight Bearing Per Order WBAT   Other Precautions Fall Risk;Pain;Telemetry   Home Living   Type of [address] [place] [address] Two level; Able to live on main level with bedroom/bathroom   Bathroom Shower/Tub Tub/shower unit   Home Equipment Walker;Cane  (Used cane at baseline)   Prior Function   Level of [place] Independent with ADLs   Lives With [facility] operated by [facility]   IADLs Independent with driving   Falls in the last 6 months 0   Subjective   Subjective Pt received in supine position  Pt agreeable to session     ADL   Eating Assistance 7  Independent   Grooming Assistance 7  Independent   UB Bathing Assistance 5  Supervision/Setup   LB Bathing Assistance 3  Moderate Assistance   UB Dressing Assistance 5  Supervision/Setup   LB Dressing Assistance 3  Moderate Assistance   Toileting Assistance  3  Moderate Assistance   Bed Mobility   Supine to Sit 5  Supervision   Additional items Verbal cues   Transfers   Sit to Stand 4  Minimal assistance   Additional items Assist x 2;Verbal cues   Stand to Sit 4  Minimal assistance   Additional items Assist x 2;Verbal cues   Stand pivot 4  Minimal assistance   Additional items Assist x 2;Verbal cues  (RW)   Additional Comments Pt impulsive at times  Required VC to wait for OT/PT assist; BP stable t/o session  Balance   Static Sitting Fair +   Dynamic Sitting Fair   Static Standing Fair -   Dynamic Standing Fair -   Activity Tolerance   Activity Tolerance Patient limited by pain   Medical Staff Made Aware PT Mary Carmen   Nurse Made Aware MATTI Holliday   RUE Assessment   RUE Assessment WFL   LUE Assessment   LUE Assessment WFL   Cognition   Overall Cognitive Status WFL   Arousal/Participation Alert   Attention Within functional limits   Orientation Level Oriented X4   Memory Within functional limits   Following Commands Follows one step commands without difficulty   Comments Pt slow to respond at times   Assessment   Limitation Decreased ADL status; Decreased self-care trans;Decreased high-level ADLs   Assessment Pt is a 56 y o  female seen for OT evaluation at [facility], admitted 2/6/2023 w/ left knee OA  Pt now s/p left TKA  Comorbidities affecting pt's functional performance at time of assessment include: left prosthetic eye  Personal factors affecting pt at time of IE include:steps to enter environment, difficulty performing ADLS, difficulty performing IADLS  and decreased functional mobility  Prior to admission, pt was living with family in house with steps to manage  Pt was I w/  ADLS and IADLS, & required walker and cane PTA  Upon evaluation: Pt requires sup for bed mobility, min A x 2 for functional mobility/transfers, sup for UB ADLs and mod A for LB ADLS 2* the following deficits impacting occupational performance: decreased ROM, decreased strength, decreased balance, decreased tolerance and increased pain  Full objective findings from OT assessment regarding body systems outlined above  These impairments, as well as risk for falls  limit pt's ability to safely engage in all baseline areas of occupation and mobility   Pt to benefit from continued skilled OT tx while in the hospital to address deficits as defined above and maximize level of functional independence w ADL's and functional mobility  Occupational Performance areas to address include: bathing/shower, toilet hygiene, dressing and functional mobility  This evaluation required an extensive review of medical and/or therapy records and additional review of physical, cognitive and psychosocial history related to functional performance  Based upon functional performance deficits and assessments, this evaluation has been identified as a high complexity evaluation  The patient's raw score on the AM-PAC Daily Activity inpatient short form is 18, standardized score is 38 66, less than 39 4  Patients at this level are likely to benefit from DC to post-acute rehabilitation services, which DOES coincide with CURRENT above OT recommendations  However please refer to therapist recommendation for discharge planning given other factors that may influence destination  At this time, OT recommendations at time of discharge are short term rehab pending progress  Pt limited by pain  Goals   Patient Goals Pt wishes to have less pain   Plan   Treatment Interventions ADL retraining;Functional transfer training;Patient/family training; Compensatory technique education   Goal Expiration Date 02/16/23   OT Treatment Day 0   OT Frequency 3-5x/wk   Recommendation   OT Discharge Recommendation Post acute rehabilitation services  (Pending progress)   AM-PAC Daily Activity Inpatient   Lower Body Dressing 2   Bathing 2   Toileting 2   Upper Body Dressing 4   Grooming 4   Eating 4   Daily Activity Raw Score 18   Daily Activity Standardized Score (Calc for Raw Score >=11) 38 66   AM-[facility] Applied Cognition Inpatient   Following a Speech/Presentation 3   Understanding Ordinary Conversation 4   Taking Medications 3   Remembering Where Things Are Placed or Put Away 3   Remembering List of 4-5 Errands 3   Taking Care of Complicated Tasks 3   Applied Cognition Raw Score 19   Applied Cognition Standardized Score 39 77   End of Consult   Education Provided Yes   Patient Position at End of Consult Bedside chair; All needs within reach   Nurse Communication Nurse aware of consult         Pt will achieve the following goals within 10 days  *Pt will complete UB bathing and dressing with mod I     *Pt will complete LB bathing and dressing with mod I      * Pt will complete toileting w/ mod I w/ G hygiene/thoroughness using DME PRN    *Pt will complete bed mobility with mod I, with bed flat and no side rail to prep for purposeful tasks    *Pt will perform functional transfers with on/off all surfaces with mod I using DME as needed w/ G balance/safety  *Pt will increase standing tolerance to 5 minutes in order to complete sinkside ADL task  *Pt will identify 3-5 fall risks during ADL routine to ensure home safety upon discharge  *Pt will improve functional mobility during ADL/IADL/leisure tasks to mod I using DME as needed w/ G balance/safety       Dayana Yang, OTR/L

## 2023-02-06 NOTE — PLAN OF CARE
Problem: OCCUPATIONAL THERAPY ADULT  Goal: Performs self-care activities at highest level of function for planned discharge setting  See evaluation for individualized goals  Description: Treatment Interventions: ADL retraining, Functional transfer training, Patient/family training, Compensatory technique education          See flowsheet documentation for full assessment, interventions and recommendations  Note: Limitation: Decreased ADL status, Decreased self-care trans, Decreased high-level ADLs     Assessment: Pt is a 56 y o  female seen for OT evaluation at [place] [id number], admitted 2/6/2023 w/ left knee OA  Pt now s/p left TKA  Comorbidities affecting pt's functional performance at time of assessment include: left prosthetic eye  Personal factors affecting pt at time of IE include:steps to enter environment, difficulty performing ADLS, difficulty performing IADLS  and decreased functional mobility  Prior to admission, pt was living with family in house with steps to manage  Pt was I w/  ADLS and IADLS, & required walker and cane PTA  Upon evaluation: Pt requires sup for bed mobility, min A x 2 for functional mobility/transfers, sup for UB ADLs and mod A for LB ADLS 2* the following deficits impacting occupational performance: decreased ROM, decreased strength, decreased balance, decreased tolerance and increased pain  Full objective findings from OT assessment regarding body systems outlined above  These impairments, as well as risk for falls  limit pt's ability to safely engage in all baseline areas of occupation and mobility  Pt to benefit from continued skilled OT tx while in the hospital to address deficits as defined above and maximize level of functional independence w ADL's and functional mobility  Occupational Performance areas to address include: bathing/shower, toilet hygiene, dressing and functional mobility    This evaluation required an extensive review of medical and/or therapy records and additional review of physical, cognitive and psychosocial history related to functional performance  Based upon functional performance deficits and assessments, this evaluation has been identified as a high complexity evaluation  The patient's raw score on the AM-PAC Daily Activity inpatient short form is 18, standardized score is 38 66, less than 39 4  Patients at this level are likely to benefit from DC to post-acute rehabilitation services, which DOES coincide with CURRENT above OT recommendations  However please refer to therapist recommendation for discharge planning given other factors that may influence destination  At this time, OT recommendations at time of discharge are short term rehab pending progress  Pt limited by pain       OT Discharge Recommendation: Post acute rehabilitation services (Pending progress)

## 2023-02-06 NOTE — PLAN OF CARE
Problem: PHYSICAL THERAPY ADULT  Goal: Performs mobility at highest level of function for planned discharge setting  See evaluation for individualized goals  Description: Treatment/Interventions: Functional transfer training, LE strengthening/ROM, Elevations, Therapeutic exercise, Endurance training, Patient/family training, Equipment eval/education, Bed mobility, Gait training, Spoke to nursing, OT          See flowsheet documentation for full assessment, interventions and recommendations  Note: Prognosis: Guarded  Problem List: Decreased strength, Decreased range of motion, Decreased endurance, Impaired balance, Decreased mobility, Impaired vision, Obesity, Pain  Assessment: Patient is a 58y/o F who is POD 0 L TKA  Patient resides with family in a [facility] with a first floor setup  2-3STE  She is independent at baseline with a SPC  Current medical status includes multiple lines, telemetry, fall risk, obesity, pain, urinary incontinence, decreased strength, balance, endurance and mobility  Patient was received supine in bed  She required assistance of 2 for transfers and ambulation  Ambulation distance limited by pain  Patient with knee buckling  Patient also with slow processing for verbal cues  Needed repeated cues and tactile cues at times  BP stable throughout session  Patient unable to be progressed at this time  Will continue to assess  Recommending rehab at this time  The patient's AM-PAC Basic Mobility Inpatient Short Form Raw Score is 12   A Raw score of less than or equal to 17 suggests the patient may benefit from discharge to post-acute rehabilitation services  Please also refer to the recommendation of the Physical Therapist for safe discharge planning  Barriers to Discharge: Inaccessible home environment     PT Discharge Recommendation: Post acute rehabilitation services    See flowsheet documentation for full assessment

## 2023-02-06 NOTE — PLAN OF CARE
Problem: PAIN - ADULT  Goal: Verbalizes/displays adequate comfort level or baseline comfort level  Description: Interventions:  - Encourage patient to monitor pain and request assistance  - Assess pain using appropriate pain scale  - Administer analgesics based on type and severity of pain and evaluate response  - Implement non-pharmacological measures as appropriate and evaluate response  - Consider cultural and social influences on pain and pain management  - Notify physician/advanced practitioner if interventions unsuccessful or patient reports new pain  Outcome: Progressing     Problem: INFECTION - ADULT  Goal: Absence or prevention of progression during hospitalization  Description: INTERVENTIONS:  - Assess and monitor for signs and symptoms of infection  - Monitor lab/diagnostic results  - Monitor all insertion sites, i e  indwelling lines, tubes, and drains  - Monitor endotracheal if appropriate and nasal secretions for changes in amount and color  - Ma[place] appropriate cooling/warming therapies per order  - Administer medications as ordered  - Instruct and encourage patient and family to use good hand hygiene technique  - Identify and instruct in appropriate isolation precautions for identified infection/condition  Outcome: Progressing  Goal: Absence of fever/infection during neutropenic period  Description: INTERVENTIONS:  - Monitor WBC    Outcome: Progressing     Problem: SAFETY ADULT  Goal: Patient will remain free of falls  Description: INTERVENTIONS:  - Educate patient/family on patient safety including physical limitations  - Instruct patient to call for assistance with activity   - Consult OT/PT to assist with strengthening/mobility   - Keep Call bell within reach  - Keep bed low and locked with side rails adjusted as appropriate  - Keep care items and personal belongings within reach  - Initiate and maintain comfort rounds  - Make Fall Risk Sign visible to staff  - Offer Toileting every 2 Hours, in advance of need  - Initiate/Maintain alarm  - Obtain necessary fall risk management equipment  - Apply yellow socks and bracelet for high fall risk patients  - Consider moving patient to room near nurses station  Outcome: Progressing  Goal: Maintain or return to baseline ADL function  Description: INTERVENTIONS:  -  Assess patient's ability to carry out ADLs; assess patient's baseline for ADL function and identify physical deficits which impact ability to perform ADLs (bathing, care of mouth/teeth, toileting, grooming, dressing, etc )  - Assess/evaluate cause of self-care deficits   - Assess range of motion  - Assess patient's mobility; develop plan if impaired  - Assess patient's need for assistive devices and provide as appropriate  - Encourage maximum independence but intervene and supervise when necessary  - Involve family in performance of ADLs  - Assess for home care needs following discharge   - Consider OT consult to assist with ADL evaluation and planning for discharge  - Provide patient education as appropriate  Outcome: Progressing  Goal: Maintains/Returns to pre admission functional level  Description: INTERVENTIONS:  - Perform BMAT or MOVE assessment daily    - Set and communicate daily mobility goal to care team and patient/family/caregiver  - Collaborate with rehabilitation services on mobility goals if consulted  - Perform Range of Motion 4 times a day  - Reposition patient every 2 hours    - Dangle patient 4 times a day  - Stand patient 4 times a day  - Ambulate patient 4 times a day  - Out of bed to chair 4 times a day   - Out of bed for meals 3 times a day  - Out of bed for toileting  - Record patient progress and toleration of activity level   Outcome: Progressing     Problem: DISCHARGE PLANNING  Goal: Discharge to home or other facility with appropriate resources  Description: INTERVENTIONS:  - Identify barriers to discharge w/patient and caregiver  - Arrange for needed discharge resources and transportation as appropriate  - Identify discharge learning needs (meds, wound care, etc )  - Arrange for interpretive services to assist at discharge as needed  - Refer to Case Management Department for coordinating discharge planning if the patient needs post-hospital services based on physician/advanced practitioner order or complex needs related to functional status, cognitive ability, or social support system  Outcome: Progressing     Problem: Knowledge Deficit  Goal: Patient/family/caregiver demonstrates understanding of disease process, treatment plan, medications, and discharge instructions  Description: Complete learning assessment and assess knowledge base    Interventions:  - Provide teaching at level of understanding  - Provide teaching via preferred learning methods  Outcome: Progressing     Problem: MUSCULOSKELETAL - ADULT  Goal: Maintain or return mobility to safest level of function  Description: INTERVENTIONS:  - Assess patient's ability to carry out ADLs; assess patient's baseline for ADL function and identify physical deficits which impact ability to perform ADLs (bathing, care of mouth/teeth, toileting, grooming, dressing, etc )  - Assess/evaluate cause of self-care deficits   - Assess range of motion  - Assess patient's mobility  - Assess patient's need for assistive devices and provide as appropriate  - Encourage maximum independence but intervene and supervise when necessary  - Involve family in performance of ADLs  - Assess for home care needs following discharge   - Consider OT consult to assist with ADL evaluation and planning for discharge  - Provide patient education as appropriate  Outcome: Progressing  Goal: Maintain proper alignment of affected body part  Description: INTERVENTIONS:  - Support, maintain and protect limb and body alignment  - Provide patient/ family with appropriate education  Outcome: Progressing

## 2023-02-06 NOTE — OP NOTE
OPERATIVE REPORT  PATIENT NAME: James Iqbal    :  1965  MRN: [id number]  Pt Location:  OR ROOM 01    SURGERY DATE: 2023    Surgeon(s) and Role:     * Kirill Calle MD - Primary     * Latanya Fournier PA-C - Assisting    Preop Diagnosis:  Primary osteoarthritis of left knee [M17 12]    Post-Op Diagnosis Codes:     * Primary osteoarthritis of left knee [M17 12]    Procedure(s):  Left - ARTHROPLASTY KNEE TOTAL    Specimen(s):  * No specimens in log *    Estimated Blood Loss:   Minimal    Drains:  * No LDAs found *    Anesthesia Type:   Spinal, sedation, Adductor canal block    Operative Indications:  Primary osteoarthritis of left knee [M17 12]    TT: 56 mins    Prosthesis: Depuy Attune+ posterior cruciate-sacrificing femur size 4N, rotating tibia size 4, Patella 35, with 6 mm mobile spacer  Indications: James Iqbal is a 56y o  years old female diagnosed with left knee osteoarthritis  Patient failed conservative treatments and elected to proceed with surgical intervention  The risks and complications are discussed with the patient  The patient consented to the procedure  Procedure: Patient was brought into the OR and placed in supine position  Patient was anethetized with spinal  Patient also had Adductor canal block done in the holding area  Patient's left knee was prepped and draped in sterile fashion with tourniquet in the upper thigh  A time out was call and identified the left knee was the operating site  Patient's preop left knee range of motion -10 - 120 with varus alignment  The leg was then exsanguinated with Esmarch  The tourniquet is inflated to 250 mmHg  A longitudinal incision was made center over the left knee  Dissection was then carried down to the extensor mechanism  A medial parapatellar arthrotomy was done to gain access into the knee joint  The patient appeared to have grade 4 changes in PF and medial compartments  The meniscus were removed   The anterior fat was also excised  The ACL and PCL were detached and excised  The tibia was then subluxed anteriorly with a retractor  An external tibia cutting guide is used to carried out the proximal tibia osteotomy  Care was make sure the external cutting guide was lined up with tibia anteriorly and also to the 2nd metatarsal with 3 degree of posterior sloping  Once the tibia cut was complete, the attention was then turn to the distal femur  An intramedullary cutting guide was used to perform the distal femoral cut  An extension block was use to assess the soft tissue balance with knee in full extension, which appears to be satisfactory with a 6 mm spacer  The distal femur was then sized and 2 pin were inserted to set the femoral component rotation  The flexion gap was assessed which appeared to have good balance  The #4 4-in-1 and notch cutting guide were used to complete the distal cut  The size 4 femoral trial was then placed onto the distal femur, which appears to have good fit with no lateral or medial overriding, but the lateral aspect of the trial was abutting the lateral femoral condyle  A 4N prosthesis was used  Attention was then turned to the tibia, which was prepared with the #4 tibial tray in slight external rotation  With a 6 mm trial spacer placed, the knee had good stability in mid-flexion and full extension  The patella was prepared to accommodate a size 35 button  A trial button was then used  Range motion of the knee was then done  The patella appeared to be gliding nicely without tilting or subluxing  The trials were removed and the bony surface was irrigated with Irrisept solution for 1 minute  The Aquamantys was used to treat the posterior capsule  30 cc of Duramorph solution was injected into the posterior, medial and lateral soft tissue  Once the bone surface is dried up and all the components were open   The cement was mixed and final prosthesis was then placed onto the distal femur, proximal tibia, and patella with cement  Excess cement was removed  Once the cement was harden, the tourniquet was let down  There were no active arterial bleeding  The venous bleeding was control with electro-Bovie and Aquamantys  The extensor mechanism was then repaired with #1 Vicryl in an interrupted fashion  Layered closure was carried out with 0 Vicryl and 2-0 Vicryl, and Stratafix was used to close the skin  Patient's post-op left knee range of motion 0 - 130 with good alignment  Stable with varus and valgus stress at full extension and flexion  Steri-strips were then applied with a sterile bulky dressing  The patient tolerated the procedure well without any complications  Patient is then extubated and transferred to recovery room for post-op care  The family was contacted  There was no qualified resident available to assist     Mrs Sadia Pereira was required in the OR in helping retracting, exposing the joint, and placement of the knee prosthesis      Complications:   None    Patient Disposition:  PACU     SIGNATURE: Trina Garcia MD  DATE: February 6, 2023  TIME: 9:31 AM

## 2023-02-06 NOTE — DISCHARGE INSTR - AVS FIRST PAGE
Dr Malu Mckenzie MD  Department of [address] [place] [address]  [address] , [facility], [address] [place] [address]  [phone]                                        PATIENT INSTRUCTIONS AFTER TOTAL KNEE REPLACEMENT/REVISION    Diet: You may resume your normal diet  It is important to maintain a healthy, balanced diet  Include plenty of fluids, as pain medicines tend to cause constipation  Medications  Pain Medications: A prescription for pain medication has been provided to upon your hospital discharge  Your goal is reduce your pain medication gradually over the next 4-6 weeks  Take your pain medicine with food to avoid stomach discomfort  Please allow at least 24-48 hours (Monday through Friday) from the time of your request to the time a will need the prescription  Constipation: To avoid constipation, take an over-the-counter stool softener (i e  docusate sodium) twice daily such as docusate sodium or Senna S twice daily and Miralax laxative once daily while on pain medication  If the combination does not alleviate your symptoms after 3 days, use a rectal suppository such as dulcolax  Blood Clot Prevention as below:   Aspirin 81 mg TWICE daily x 6 weeks                   Activity  Rest Periods: Gradually increase your activity on a daily basis  The amount of time you spend out of bed and the number and distance of your walks should gradually increase each day  Between activities such as walking, meals, exercises, etc , take a rest period for approximately 1 hour in the morning, afternoon and evening  Limit sitting to 1 hour intervals for the next 4 - 6 weeks  Weight-bearing: You may put as much weight as is comfortable on your operative leg with activity  Use a walker or crutches while walking for at least 3 weeks  At that time, it is advised to use a cane until you are able to apply full weight to the operated leg    Impact Loading: Low-impact activities such as golf, stationary bicycling and slow dancing may begin in 6 weeks, while swimming is allowed at 4 weeks after surgery  All activities involving quick starts and stops, or impact loading, such as tennis, should be avoided to lower your risk of early loosening of the prosthesis  Bathing: The sticky dressing covering your wound is waterproof and can get wet in shower  If sticky dressing becomes saturated or peels off before your first office visit you can replace with gauze and tape  Then you CANNOT get incision wet in shower  Driving: You should not drive until approximately 4 weeks after surgery  While you are traveling as a passenger for the first 4 weeks following surgery, it is advised that you get out of the car at least hourly and take a short walk  Returning to work: The decision to return to work will be based on the type of work you do, your physical stamina, and whether you have other medical conditions  We recommend that you avoid making any major changes in your work or detention plans until your recovery is complete  Wound Care  Keep incision clean, dry and covered  It is normal to see some bloody drainage through dressing  No creams or ointments on the incision for 4 weeks  Your incision may be warm, itchy, and slightly red for several weeks after surgery  Excessive redness, soreness, or drainage from the incision area should be reported to our office  Common Problems  Leg & ankle swelling: You may have some swelling in your operated leg that should gradually decrease  If swelling occurs, lie down, elevate your legs, and rest  You are to wear the compression stocking daily  Apply first thing in AM and remove in evening for sleeping  Repeat daily  Pain:  Pain may be a result of over-activity  When you are in pain, sit or lie down, elevated your legs, and rest   If the pain does not subside, take the pain medication prescribed for you    Pain is a protective mechanism that helps to prevent over-usage and should not be ignored  Return Appointments: You should have a scheduled appointment for followup  If you do not already have a followup appointment scheduled, please call the office at [phone] to schedule an appointment  Call our office if you have:  Temperature of 101o or higher  Drainage from your incision  Increasing redness around your incision  Increasing  pain around the incision, unrelieved by pain medication  Excessive calf or thigh pain & swelling that does not go away with elevation and rest      ANTIBIOTIC PROPHYLAXIS AFTER TOTAL JOINT REPLACEMENT  For protection against the remote possibility of blood-borne bacteria, carried from the mouth during a dental procedure, creating an infection in a total joint replacement, a combined task force of [employer] and the [address] [place] [address] has made the following guideline recommendations: Following total joint replacement, all patients are advised to take an antibiotic regimen for the following dental procedures AS LIFETIME THERAPY:  Prophylactic cleaning of teeth or implants  Intraligamentary local anesthetic injections  Periodontal procedures  Root canal procedures  Dental extractions  Dental implant procedures  Implantation of avulsed teeth  Initial placement of orthodontic bands    The recommended antibiotic regimen (if not allergic to penicillin) is:  Amoxicillin, Cephalexin (e g  Keflex), or Cephradine two (2 0) grams orally one (1) hour prior to the dental procedure  For patients with a penicillin allergy, the recommended antibiotic is:  Clindamycin (e g  Cleocin) 600 mg orally one hour prior to the dental procedure  Antibiotic prophylaxis is not warranted for dental procedures for patients with previously placed orthopedic pins, plates or screws  The above recommendations are considered minimum guidelines    Your doctor and/or dentist are ultimately responsible for making individual treatment recommendations to you based on their clinical judgment

## 2023-02-06 NOTE — ANESTHESIA PROCEDURE NOTES
Spinal Block    Patient location during procedure: OR  Reason for block: procedure for pain and at surgeon's request  Staffing  Performed: Anesthesiologist and CRNA   Anesthesiologist: Aishwarya Sexton MD  Resident/CRNA: Nile Lundy CRNA  Preanesthetic Checklist  Completed: patient identified, IV checked, site marked, risks and benefits discussed, surgical consent, monitors and equipment checked, pre-op evaluation and timeout performed  Spinal Block  Patient position: sitting  Prep: ChloraPrep  Patient monitoring: cardiac monitor and frequent blood pressure checks  Approach: midline  Location: L3-4  Injection technique: single-shot  Needle  Needle type: pencil-tip   Needle gauge: 25 G  Needle length: 10 cm  Assessment  Sensory level: T4  Injection Assessment:  negative aspiration for heme, no paresthesia on injection and positive aspiration for clear CSF    Post-procedure:  adhesive bandage applied, pressure dressing applied, secured with tape, site cleaned and sterile dressing applied

## 2023-02-06 NOTE — INTERVAL H&P NOTE
H&P reviewed  After examining the patient I find no changes in the patients condition since the H&P had been written      Vitals:    02/06/23 0638   BP: 111/68   Pulse: 56   Resp: 16   Temp: 97 6 °F (36 4 °C)   SpO2: 97%

## 2023-02-06 NOTE — ANESTHESIA POSTPROCEDURE EVALUATION
Post-Op Assessment Note    CV Status:  Stable  Pain Score: 0    Pain management: adequate     Mental Status:  Alert and awake   Hydration Status:  Euvolemic   PONV Controlled:  Controlled   Airway Patency:  Patent      Post Op Vitals Reviewed: Yes      Staff: Anesthesiologist, CRNA         No notable events documented      BP   83/56   Temp   98 4   Pulse  56   Resp   18   SpO2   96   /

## 2023-02-06 NOTE — PHYSICAL THERAPY NOTE
PHYSICAL THERAPY EVAL/TX  Physical Therapy Evaluation    Performed at least 2 patient identifiers during session:  Patient Active Problem List   Diagnosis    Primary osteoarthritis of right knee    Primary osteoarthritis of left knee       Past Medical History:   Diagnosis Date    Asthma     Lupus ([address] )        Past Surgical History:   Procedure Laterality Date    FOOT SURGERY Right     Talonavicular arthrodesis with calcaneal osteotomy          02/06/23 1400   PT Last Visit   PT Visit Date 02/06/23   Note Type   Note type Evaluation   Pain Assessment   Pain Assessment Tool 0-10   Pain Score 8   Pain Location/Orientation Orientation: Left; Location: Knee   Hospital Pain Intervention(s) Medication (See MAR); Ambulation/increased activity;Repositioned   Restrictions/Precautions   Weight Bearing Precautions Per Order Yes   LLE Weight Bearing Per Order WBAT   Other Precautions Pain; Fall Risk;Telemetry;Multiple lines;WBS; Bed Alarm; Chair Alarm   Home Living   Type of [address] [place] [address] Two level  (2-3 NANCY  1st floor setup if needed)   Home Equipment Walker;Cane   Additional Comments Uses SPC at baseline   Prior Function   Level of [place] Independent with ADLs; Independent with functional mobility; Independent with IADLS   Lives With Son;Daughter  (Granddaughter)   IADLs Independent with driving; Independent with meal prep; Independent with medication management   Falls in the last 6 months 0   General   Family/Caregiver Present No   Cognition   Overall Cognitive Status WFL   Arousal/Participation Alert   Following Commands Follows one step commands with increased time or repetition   Comments Slow response time   Subjective   Subjective "I have to use the bathroom "   RLE Assessment   RLE Assessment WFL   Light Touch   RLE Light Touch Grossly intact   LLE Light Touch Grossly intact   Bed Mobility   Supine to Sit 5  Supervision Additional items HOB elevated; Bedrails; Increased time required;Verbal cues   Additional Comments Sat edge of bed for approx 5-6 minutes with supervision  BP's obtained in supine and sitting  Stable  Transfers   Sit to Stand 4  Minimal assistance   Additional items Assist x 2;Armrests; Increased time required;Verbal cues   Stand to Sit 4  Minimal assistance   Additional items Assist x 2;Armrests; Increased time required;Verbal cues   Ambulation/Elevation   Gait pattern Decreased L stance; Step to; Antalgic; Forward Flexion  (L knee flexed, slight knee buckling)   Gait Assistance 4  Minimal assist   Additional items Assist x 2;Verbal cues; Tactile cues   Assistive Device Rolling walker   Distance 3ft  (bed to commode)   Ambulation/Elevation Additional Comments Refer to treatment session for additional mobility   Balance   Static Sitting Fair +   Dynamic Sitting Fair   Static Standing Fair -   Dynamic Standing Fair -   Ambulatory Fair -   Endurance Deficit   Endurance Deficit Yes   Endurance Deficit Description Limited by pain   Activity Tolerance   Activity Tolerance Patient limited by pain   Medical Staff Made Aware Co-treat with OT, [facility]   Nurse Made Aware RN   Assessment   Prognosis Guarded   Problem List Decreased strength;Decreased range of motion;Decreased endurance; Impaired balance;Decreased mobility; Impaired vision;Obesity;Pain   Assessment Patient is a 58y/o F who is POD 0 L TKA  Patient resides with family in a [facility] with a first floor setup  2-3STE  She is independent at baseline with a SPC  Current medical status includes multiple lines, telemetry, fall risk, obesity, pain, urinary incontinence, decreased strength, balance, endurance and mobility  Patient was received supine in bed  She required assistance of 2 for transfers and ambulation  Ambulation distance limited by pain  Patient with knee buckling  Patient also with slow processing for verbal cues  Needed repeated cues and tactile cues at times   BP stable throughout session  Patient unable to be progressed at this time  Will continue to assess  Recommending rehab at this time  The patient's AM-[facility] Basic Mobility Inpatient Short Form Raw Score is 12   A Raw score of less than or equal to 17 suggests the patient may benefit from discharge to post-acute rehabilitation services  Please also refer to the recommendation of the Physical Therapist for safe discharge planning  Barriers to Discharge Inaccessible home environment   Goals   Patient Goals To have less pain   STG Expiration Date 02/10/23   Short Term Goal #1 1  Perform supine<>sit with HOB flat without the use of bedrails ind 2  Perform sit<>stand transfers mod I 3  Ambulate 150ft with a RW mod I level 4  Ascend/descend 3 steps with railing with supervision nonreciprocal pattern   PT Treatment Day 1   Plan   Treatment/Interventions Functional transfer training;LE strengthening/ROM; Elevations; Therapeutic exercise; Endurance training;Patient/family training;Equipment eval/education; Bed mobility;Gait training;Spoke to nursing;OT   PT Frequency Twice a day   Recommendation   PT Discharge Recommendation Post acute rehabilitation services   AM-PAC Basic Mobility Inpatient   Turning in Flat Bed Without Bedrails 3   Lying on Back to Sitting on Edge of Flat Bed Without Bedrails 3   Moving Bed to Chair 2   Standing Up From Chair Using Arms 2   Walk in Room 1   Climb 3-5 Stairs With Railing 1   Basic Mobility Inpatient Raw Score 12   Basic Mobility Standardized Score 32 23   Highest Level Of Mobility   JH-HLM Goal 4: Move to chair/commode   JH-HLM Achieved 4: Move to chair/commode   Additional Treatment Session   Start Time 1350   End Time 1400   Treatment Assessment Performed additional sit<>stand transfer with min A x 2  Verbal and tactile cues for hand placement  Patient ambulated 3ft with a RW with min A x 2  Flexed posture, flexed L knee, antalgic gait, step to pattern   Patient left reclined in recliner chair with stable BP  Equipment Use RW   End of Consult   Patient Position at End of Consult Bedside chair;Bed/Chair alarm activated; All needs within reach   End of Consult Comments LE's elevated  Raegan Clinton Banda, PT             Patient Name: Elsa Common Date: 2/6/2023

## 2023-02-07 LAB
ANION GAP SERPL CALCULATED.3IONS-SCNC: 6 MMOL/L (ref 4–13)
BUN SERPL-MCNC: 8 MG/DL (ref 5–25)
CALCIUM SERPL-MCNC: 9.4 MG/DL (ref 8.3–10.1)
CHLORIDE SERPL-SCNC: 106 MMOL/L (ref 96–108)
CO2 SERPL-SCNC: 29 MMOL/L (ref 21–32)
CREAT SERPL-MCNC: 0.61 MG/DL (ref 0.6–1.3)
GFR SERPL CREATININE-BSD FRML MDRD: 102 ML/MIN/1.73SQ M
GLUCOSE SERPL-MCNC: 100 MG/DL (ref 65–140)
HCT VFR BLD AUTO: 30.3 % (ref 34.8–46.1)
HGB BLD-MCNC: 9.9 G/DL (ref 11.5–15.4)
POTASSIUM SERPL-SCNC: 3.8 MMOL/L (ref 3.5–5.3)
SODIUM SERPL-SCNC: 141 MMOL/L (ref 135–147)

## 2023-02-07 RX ORDER — MUSCLE RUB CREAM 100; 150 MG/G; MG/G
CREAM TOPICAL 2 TIMES DAILY
Status: DISCONTINUED | OUTPATIENT
Start: 2023-02-07 | End: 2023-02-09 | Stop reason: HOSPADM

## 2023-02-07 RX ADMIN — MENTHOL, METHYL SALICYLATE: 10; 15 CREAM TOPICAL at 15:11

## 2023-02-07 RX ADMIN — CYCLOBENZAPRINE HYDROCHLORIDE 5 MG: 5 TABLET, FILM COATED ORAL at 07:48

## 2023-02-07 RX ADMIN — ACETAMINOPHEN 975 MG: 325 TABLET, FILM COATED ORAL at 16:39

## 2023-02-07 RX ADMIN — ENOXAPARIN SODIUM 30 MG: 100 INJECTION SUBCUTANEOUS at 21:39

## 2023-02-07 RX ADMIN — GABAPENTIN 100 MG: 100 CAPSULE ORAL at 16:39

## 2023-02-07 RX ADMIN — TRAMADOL HYDROCHLORIDE 50 MG: 50 TABLET ORAL at 05:10

## 2023-02-07 RX ADMIN — OXYCODONE HYDROCHLORIDE AND ACETAMINOPHEN 500 MG: 500 TABLET ORAL at 16:39

## 2023-02-07 RX ADMIN — ENOXAPARIN SODIUM 30 MG: 100 INJECTION SUBCUTANEOUS at 07:54

## 2023-02-07 RX ADMIN — SODIUM CHLORIDE, SODIUM LACTATE, POTASSIUM CHLORIDE, AND CALCIUM CHLORIDE 75 ML/HR: .6; .31; .03; .02 INJECTION, SOLUTION INTRAVENOUS at 21:31

## 2023-02-07 RX ADMIN — GABAPENTIN 100 MG: 100 CAPSULE ORAL at 00:50

## 2023-02-07 RX ADMIN — PANTOPRAZOLE SODIUM 40 MG: 40 TABLET, DELAYED RELEASE ORAL at 07:47

## 2023-02-07 RX ADMIN — OXYCODONE HYDROCHLORIDE 5 MG: 5 TABLET ORAL at 02:22

## 2023-02-07 RX ADMIN — SODIUM CHLORIDE, SODIUM LACTATE, POTASSIUM CHLORIDE, AND CALCIUM CHLORIDE 75 ML/HR: .6; .31; .03; .02 INJECTION, SOLUTION INTRAVENOUS at 07:35

## 2023-02-07 RX ADMIN — FOLIC ACID 1 MG: 1 TABLET ORAL at 07:48

## 2023-02-07 RX ADMIN — STANDARDIZED SENNA CONCENTRATE 8.6 MG: 8.6 TABLET ORAL at 07:53

## 2023-02-07 RX ADMIN — CYCLOBENZAPRINE HYDROCHLORIDE 5 MG: 5 TABLET, FILM COATED ORAL at 16:39

## 2023-02-07 RX ADMIN — FERROUS SULFATE TAB 325 MG (65 MG ELEMENTAL FE) 325 MG: 325 (65 FE) TAB at 16:40

## 2023-02-07 RX ADMIN — DOCUSATE SODIUM 100 MG: 100 CAPSULE, LIQUID FILLED ORAL at 07:47

## 2023-02-07 RX ADMIN — TRAMADOL HYDROCHLORIDE 50 MG: 50 TABLET ORAL at 16:40

## 2023-02-07 RX ADMIN — SODIUM CHLORIDE, POTASSIUM CHLORIDE, SODIUM LACTATE AND CALCIUM CHLORIDE 1000 ML: 600; 310; 30; 20 INJECTION, SOLUTION INTRAVENOUS at 20:19

## 2023-02-07 RX ADMIN — CYCLOBENZAPRINE HYDROCHLORIDE 5 MG: 5 TABLET, FILM COATED ORAL at 21:39

## 2023-02-07 RX ADMIN — ACETAMINOPHEN 975 MG: 325 TABLET, FILM COATED ORAL at 00:49

## 2023-02-07 RX ADMIN — OXYCODONE HYDROCHLORIDE AND ACETAMINOPHEN 500 MG: 500 TABLET ORAL at 07:48

## 2023-02-07 RX ADMIN — TRAMADOL HYDROCHLORIDE 50 MG: 50 TABLET ORAL at 11:32

## 2023-02-07 RX ADMIN — DOCUSATE SODIUM 100 MG: 100 CAPSULE, LIQUID FILLED ORAL at 16:39

## 2023-02-07 RX ADMIN — FERROUS SULFATE TAB 325 MG (65 MG ELEMENTAL FE) 325 MG: 325 (65 FE) TAB at 07:48

## 2023-02-07 RX ADMIN — GABAPENTIN 100 MG: 100 CAPSULE ORAL at 09:51

## 2023-02-07 RX ADMIN — HYDROXYCHLOROQUINE SULFATE 200 MG: 200 TABLET, FILM COATED ORAL at 07:53

## 2023-02-07 RX ADMIN — MULTIPLE VITAMINS W/ MINERALS TAB 1 TABLET: TAB ORAL at 07:48

## 2023-02-07 RX ADMIN — ACETAMINOPHEN 975 MG: 325 TABLET, FILM COATED ORAL at 09:51

## 2023-02-07 NOTE — PHYSICAL THERAPY NOTE
PHYSICAL THERAPY NOTE     02/07/23 1031   PT Last Visit   PT Visit Date 02/07/23   Note Type   Note Type Treatment   Pain Assessment   Pain Assessment Tool 0-10   Pain Score 5   Pain Location/Orientation Orientation: Left; Location: Knee   Hospital Pain Intervention(s) Medication (See MAR); Cold applied;Repositioned   Restrictions/Precautions   Weight Bearing Precautions Per Order Yes   LLE Weight Bearing Per Order WBAT   Other Precautions Pain; Fall Risk;Bed Alarm; Chair Alarm   General   Chart Reviewed Yes   Additional Pertinent History +orthostatic hypotension   Family/Caregiver Present No   Cognition   Overall Cognitive Status WFL   Arousal/Participation Alert   Attention Within functional limits   Orientation Level Oriented X4   Memory Within functional limits   Following Commands Follows one step commands with increased time or repetition   Subjective   Subjective "It hurts to bend it "   Bed Mobility   Supine to Sit 5  Supervision   Additional items HOB elevated; Bedrails;Verbal cues  (Assisting moving LLE with UE's)   Transfers   Sit to Stand 4  Minimal assistance   Additional items Assist x 2;Armrests; Increased time required;Verbal cues   Stand to Sit 4  Minimal assistance   Additional items Assist x 1; Armrests; Increased time required;Verbal cues   Additional Comments BP supine: 107/55  sitting 112/74  standing 100/59 standing after 2-3 minutes 92/60  sitting after ambulation 97/57  sitting after 2-3 minutes 97/65   Ambulation/Elevation   Gait pattern Antalgic; Foward flexed;Decreased L stance;Decreased heel strike;Decreased toe off  (L knee flexed)   Gait Assistance 4  Minimal assist   Additional items Assist x 1;Verbal cues; Tactile cues   Assistive Device Rolling walker   Distance 3ft   Stair Management Assistance Not tested   Balance   Static Sitting Good   Dynamic Sitting Fair +   Static Standing Fair   Dynamic Standing Fair Ambulatory Fair   Endurance Deficit   Endurance Deficit Yes   Endurance Deficit Description Limited by pain   Activity Tolerance   Activity Tolerance Patient limited by pain;Treatment limited secondary to medical complications (Comment)  (Orthostatic hypotension)   Medical Staff Made Aware Co-treat with Kamille MURRAY   Nurse Made Aware RNLew-aware of BP   Assessment   Prognosis Good   Problem List Decreased strength;Decreased endurance; Impaired balance;Decreased mobility; Decreased range of motion;Obesity;Pain   Assessment Patient was received supine in bed  Patient was orthostatic during session and was symptomatic with lightheadedness  She required assistance for all mobility  Repeated education and verbal cues for proper transfer technique and proper use of RW  Patient amb distance currently limited by pain and low BP  She is progressing slowly and will need rehab at discharge  The patient's AM-[facility] Basic Mobility Inpatient Short Form Raw Score is 14  A Raw score of less than or equal to 17suggests the patient may benefit from discharge to post-acute rehabilitation services  Please also refer to the recommendation of the Physical Therapist for safe discharge planning  Barriers to Discharge Inaccessible home environment;Decreased caregiver support   Goals   Patient Goals To have less pain   STG Expiration Date 02/10/23   Plan   Treatment/Interventions Functional transfer training;LE strengthening/ROM; Therapeutic exercise; Endurance training;Equipment eval/education; Bed mobility;Gait training;Spoke to nursing;OT   Progress Slow progress, medical status limitations   PT Frequency Twice a day   Recommendation   PT Discharge Recommendation Post acute rehabilitation services   AM-[facility] Basic Mobility Inpatient   Turning in Flat Bed Without Bedrails 3   Lying on Back to Sitting on Edge of Flat Bed Without Bedrails 3   Moving Bed to Chair 3   Standing Up From Chair Using Arms 3   Walk in Room 1   Climb 3-5 Stairs With [place] 1   Basic Mobility Inpatient Raw Score 14   Basic Mobility Standardized Score 35 55   Highest Level Of Mobility   -HLM Goal 4: Move to chair/commode   -HLM Achieved 4: Move to chair/commode   Education   Education Provided Mobility training;Assistive device   Patient Reinforcement needed   End of Consult   Patient Position at End of Consult Bedside chair;Bed/Chair alarm activated; All needs within reach   Wilma Banda            Patient Name: Isidro Rivera Date: 2/7/2023

## 2023-02-07 NOTE — PLAN OF CARE
Problem: PAIN - ADULT  Goal: Verbalizes/displays adequate comfort level or baseline comfort level  Description: Interventions:  - Encourage patient to monitor pain and request assistance  - Assess pain using appropriate pain scale  - Administer analgesics based on type and severity of pain and evaluate response  - Implement non-pharmacological measures as appropriate and evaluate response  - Consider cultural and social influences on pain and pain management  - Notify physician/advanced practitioner if interventions unsuccessful or patient reports new pain  Outcome: Progressing     Problem: INFECTION - ADULT  Goal: Absence or prevention of progression during hospitalization  Description: INTERVENTIONS:  - Assess and monitor for signs and symptoms of infection  - Monitor lab/diagnostic results  - Monitor all insertion sites, i e  indwelling lines, tubes, and drains  - Monitor endotracheal if appropriate and nasal secretions for changes in amount and color  - [place] appropriate cooling/warming therapies per order  - Administer medications as ordered  - Instruct and encourage patient and family to use good hand hygiene technique  - Identify and instruct in appropriate isolation precautions for identified infection/condition  Outcome: Progressing  Goal: Absence of fever/infection during neutropenic period  Description: INTERVENTIONS:  - Monitor WBC    Outcome: Progressing     Problem: SAFETY ADULT  Goal: Patient will remain free of falls  Description: INTERVENTIONS:  - Educate patient/family on patient safety including physical limitations  - Instruct patient to call for assistance with activity   - Consult OT/PT to assist with strengthening/mobility   - Keep Call bell within reach  - Keep bed low and locked with side rails adjusted as appropriate  - Keep care items and personal belongings within reach  - Initiate and maintain comfort rounds  - Make Fall Risk Sign visible to staff  - Offer Toileting every 2 Hours, in advance of need  - Initiate/Maintain alarm  - Obtain necessary fall risk management equipment  - Apply yellow socks and bracelet for high fall risk patients  - Consider moving patient to room near nurses station  Outcome: Progressing  Goal: Maintain or return to baseline ADL function  Description: INTERVENTIONS:  -  Assess patient's ability to carry out ADLs; assess patient's baseline for ADL function and identify physical deficits which impact ability to perform ADLs (bathing, care of mouth/teeth, toileting, grooming, dressing, etc )  - Assess/evaluate cause of self-care deficits   - Assess range of motion  - Assess patient's mobility; develop plan if impaired  - Assess patient's need for assistive devices and provide as appropriate  - Encourage maximum independence but intervene and supervise when necessary  - Involve family in performance of ADLs  - Assess for home care needs following discharge   - Consider OT consult to assist with ADL evaluation and planning for discharge  - Provide patient education as appropriate  Outcome: Progressing  Goal: Maintains/Returns to pre admission functional level  Description: INTERVENTIONS:  - Perform BMAT or MOVE assessment daily    - Set and communicate daily mobility goal to care team and patient/family/caregiver  - Collaborate with rehabilitation services on mobility goals if consulted  - Perform Range of Motion 4 times a day  - Reposition patient every 2 hours    - Dangle patient 4 times a day  - Stand patient 4 times a day  - Ambulate patient 4 times a day  - Out of bed to chair 4 times a day   - Out of bed for meals 3 times a day  - Out of bed for toileting  - Record patient progress and toleration of activity level   Outcome: Progressing     Problem: DISCHARGE PLANNING  Goal: Discharge to home or other facility with appropriate resources  Description: INTERVENTIONS:  - Identify barriers to discharge w/patient and caregiver  - Arrange for needed discharge resources and transportation as appropriate  - Identify discharge learning needs (meds, wound care, etc )  - Arrange for interpretive services to assist at discharge as needed  - Refer to Case Management Department for coordinating discharge planning if the patient needs post-hospital services based on physician/advanced practitioner order or complex needs related to functional status, cognitive ability, or social support system  Outcome: Progressing     Problem: Knowledge Deficit  Goal: Patient/family/caregiver demonstrates understanding of disease process, treatment plan, medications, and discharge instructions  Description: Complete learning assessment and assess knowledge base    Interventions:  - Provide teaching at level of understanding  - Provide teaching via preferred learning methods  Outcome: Progressing     Problem: MUSCULOSKELETAL - ADULT  Goal: Maintain or return mobility to safest level of function  Description: INTERVENTIONS:  - Assess patient's ability to carry out ADLs; assess patient's baseline for ADL function and identify physical deficits which impact ability to perform ADLs (bathing, care of mouth/teeth, toileting, grooming, dressing, etc )  - Assess/evaluate cause of self-care deficits   - Assess range of motion  - Assess patient's mobility  - Assess patient's need for assistive devices and provide as appropriate  - Encourage maximum independence but intervene and supervise when necessary  - Involve family in performance of ADLs  - Assess for home care needs following discharge   - Consider OT consult to assist with ADL evaluation and planning for discharge  - Provide patient education as appropriate  Outcome: Progressing  Goal: Maintain proper alignment of affected body part  Description: INTERVENTIONS:  - Support, maintain and protect limb and body alignment  - Provide patient/ family with appropriate education  Outcome: Progressing     Problem: MOBILITY - ADULT  Goal: Maintain or return to baseline ADL function  Description: INTERVENTIONS:  -  Assess patient's ability to carry out ADLs; assess patient's baseline for ADL function and identify physical deficits which impact ability to perform ADLs (bathing, care of mouth/teeth, toileting, grooming, dressing, etc )  - Assess/evaluate cause of self-care deficits   - Assess range of motion  - Assess patient's mobility; develop plan if impaired  - Assess patient's need for assistive devices and provide as appropriate  - Encourage maximum independence but intervene and supervise when necessary  - Involve family in performance of ADLs  - Assess for home care needs following discharge   - Consider OT consult to assist with ADL evaluation and planning for discharge  - Provide patient education as appropriate  Outcome: Progressing  Goal: Maintains/Returns to pre admission functional level  Description: INTERVENTIONS:  - Perform BMAT or MOVE assessment daily    - Set and communicate daily mobility goal to care team and patient/family/caregiver  - Collaborate with rehabilitation services on mobility goals if consulted  - Perform Range of Motion 4 times a day  - Reposition patient every 2 hours    - Dangle patient 4 times a day  - Stand patient 4 times a day  - Ambulate patient 4 times a day  - Out of bed to chair 4 times a day   - Out of bed for meals 3 times a day  - Out of bed for toileting  - Record patient progress and toleration of activity level   Outcome: Progressing

## 2023-02-07 NOTE — CASE MANAGEMENT
Case Management Discharge Planning Note    Patient name Derrick Gonzales  Location [facility] [address] 219/-01 MRN [id number]  : 1965 Date 2023       Current Admission Date: 2023  Current Admission Diagnosis:Primary osteoarthritis of left knee   Patient Active Problem List    Diagnosis Date Noted   • Primary osteoarthritis of right knee 2023   • Primary osteoarthritis of left knee 2023      LOS (days): 0  Geometric Mean LOS (GMLOS) (days):   Days to GMLOS:     OBJECTIVE:            Current admission status: Outpatient Surgery   Preferred Pharmacy:   [facility]/pharmacy #[id number] Amato  Phone: [phone] Fax: [phone]    Primary Care Provider: Nelson Rosario    Primary Insurance: [place] [facility]  Secondary Insurance:     DISCHARGE DETAILS:       Additional Comments: Met with pt to review accepting SNF; [facility]  Pt is agreeable  CM informed You Worrell at [place] of same  You Worrell states bed is available and she will submit for authorization  You Worrell to inform CM once Lorenzo Rosales is received

## 2023-02-07 NOTE — PROGRESS NOTES
Kim Li  56 y o   female  MR#: [id number]  2/7/2023    Post-op days: 1  Extremity: left knee    Subjective: Patient seen and examined  Lying comfortably in bed  No issues overnight  Pain is controlled with medication currently  She denies any chest pain, shortness of breath, fevers or chills  She states she does have first-floor access in her home where the bathroom is located as well  Vitals:   Vitals:    02/07/23 0510   BP: 118/58   Pulse: 55   Resp: 16   Temp: 98 2 °F (36 8 °C)   SpO2: 93%       Exam:   A&O x 3 NAD  Left knee:  Mepilex dressing intact  Small area of sanguinous drainage at mid dressing  Thigh and calf compartments soft, compressible  Moderate swelling of knee  Actively moving ankle and toes  DP 2+  Sensation intact to light touch      Labs:   WBC No results for input(s): WBC in the last 72 hours  H/H   Recent Labs     02/07/23  0517   HGB 9 9*   /  Recent Labs     02/07/23  0517   HCT 30 3*     Sed Rate No results for input(s): SEDRATE in the last 72 hours  CRP No results for input(s): CRP in the last 72 hours  Assessment:   S/p left TKA    Plan:   Weightbearing as tolerated to left lower extremity with assistance  PT/OT  Pain control as needed  DVT prophylaxis: Lovenox and mechanical   Will discharge home on aspirin 81 mg twice daily x6 weeks  ABLA: Hbg 9 9 this AM  Continue to monitor vitals and H/H  Continue knee-high compression stocking during the day  Ice and elevation to left knee  Absolutely no pillows or rolled up towels underneath left knee  Keep pillow underneath ankle for full extension of knee  DC planning: We will discharge once cleared by PT/OT  Follow-up as outpatient as scheduled with Dr Court Blanc in 10 to 14 days

## 2023-02-07 NOTE — CASE MANAGEMENT
Case Management Assessment & Discharge Planning Note    Patient name Jelly Landa  Location [facility] 219/-01 MRN [id number]  : 1965 Date 2023       Current Admission Date: 2023  Current Admission Diagnosis:Primary osteoarthritis of left knee   Patient Active Problem List    Diagnosis Date Noted   • Primary osteoarthritis of right knee 2023   • Primary osteoarthritis of left knee 2023      LOS (days): 0  Geometric Mean LOS (GMLOS) (days):   Days to GMLOS:     OBJECTIVE:              Current admission status: Outpatient Surgery       Preferred Pharmacy:   [facility] #[id number]dy [facility]  Phone: [phone] Fax: [phone]    Primary Care Provider: Car Casas    Primary Insurance[place] [employer] [employer] [place]  Secondary Insurance:     ASSESSMENT:  Maty Morris Proxies    There are no active Health Care Proxies on file  Advance Directives  Does patient have a [address]?: No  Was patient offered paperwork?: Yes (declined)  Does patient currently have a Health Care decision maker?: Yes, please see Health Care Proxy section  Does patient have Advance Directives?: No  Was patient offered paperwork?: Yes (declined)    Patient Information  Admitted from[de-identified] Home  Mental Status: Alert  During Assessment patient was accompanied by: Not accompanied during assessment  Assessment information provided by[de-identified] Patient  Primary Caregiver: Self  Support Systems: Spouse/significant other, Daughter, Son, Family members  Home entry access options   Select all that apply : Stairs  Number of steps to enter home : 3  Type of Current Residence: [address] [place] [address] home  Upon entering residence, is there a bedroom on the main floor (no further steps)?: Yes  Upon entering residence, is there a bathroom on the main floor (no further steps)?: Yes  In the last 12 months, was there a time when you were not able to pay the mortgage or rent on time?: No  In the last 12 months, how many places have you lived?: 1  In the last 12 months, was there a time when you did not have a steady place to sleep or slept in a shelter (including now)?: No  Homeless/housing insecurity resource given?: N/A  Living Arrangements: Lives w/ Spouse/significant other    Activities of Daily Living Prior to Admission  Functional Status: Independent  Completes ADLs independently?: Yes  Ambulates independently?: Yes  Does patient use assisted devices?: No  Does patient currently own DME?: Yes  What DME does the patient currently own?: Jeffery Devine  Does patient have a history of Outpatient Therapy (PT/OT)?: No  Does the patient have a history of Short-Term Rehab?: No  Does patient have a history of HHC?: No  Does patient currently have [facility]?: No    Patient Information Continued  Does patient have prescription coverage?: Yes  Within the past 12 months, you worried that your food would run out before you got the money to buy more : Never true  Within the past 12 months, the food you bought just didn't last and you didn't have money to get more : Never true  Food insecurity resource given?: N/A  Does patient receive dialysis treatments?: No  Does patient have a history of substance abuse?: No  Does patient have a history of Mental Health Diagnosis?: No    Means of Transportation  Means of Transport to Appts[de-identified] Drives Self  In the past 12 months, has lack of transportation kept you from medical appointments or from getting medications?: No  In the past 12 months, has lack of transportation kept you from meetings, work, or from getting things needed for daily living?: No  Was application for public transport provided?: N/A    DISCHARGE DETAILS:    Discharge planning discussed with[de-identified] patient  Freedom of Choice: Yes  Comments - Freedom of Choice: CM discussed therapies recommendation for rehab  Pt is agreeable  Pt is requesting referrals within a 20 mile radius    CM contacted family/caregiver?: Yes  Were Treatment Team discharge recommendations reviewed with patient/caregiver?: Yes  Did patient/caregiver verbalize understanding of patient care needs?: Yes  Were patient/caregiver advised of the risks associated with not following Treatment Team discharge recommendations?: Yes    Requested [address]         Is the patient interested in [facility] at discharge?: No    DME Referral Provided  Referral made for DME?: No    Other Referral/Resources/Interventions Provided:  Interventions: Short Term Rehab  Referral Comments: Referrals to SNF's within 20 miles    Treatment Team Recommendation: Short Term Rehab  Discharge Destination Plan[de-identified] Short Term Rehab  Transport at Discharge : Family    Additional Comments: Met with pt to discuss the role of CM and to discuss any help pt may need prior to dc  Pt lives with her , son, daughter, and granddaughter in 2 story home with 3 NANCY and a 1st floor setup  Pt performed ADL's indptly pta, no use of DME  Pt has a RW, cane, and bedside commode  Pt drives  Pt's family is available to assist at home  CM discussed therapies recommendation for rehab  Pt is agreeable and requesting referrals to SNF's within a 20 mile radius  [address] referrals sent

## 2023-02-07 NOTE — PLAN OF CARE
Problem: PHYSICAL THERAPY ADULT  Goal: Performs mobility at highest level of function for planned discharge setting  See evaluation for individualized goals  Description: Treatment/Interventions: Functional transfer training, LE strengthening/ROM, Elevations, Therapeutic exercise, Endurance training, Patient/family training, Equipment eval/education, Bed mobility, Gait training, Spoke to nursing, OT          See flowsheet documentation for full assessment, interventions and recommendations  Outcome: Not Progressing  Note: Prognosis: Good  Problem List: Decreased strength, Decreased endurance, Impaired balance, Decreased mobility, Decreased range of motion, Obesity, Pain  Assessment: Patient was received supine in bed  Patient was orthostatic during session and was symptomatic with lightheadedness  She required assistance for all mobility  Repeated education and verbal cues for proper transfer technique and proper use of RW  Patient amb distance currently limited by pain and low BP  She is progressing slowly and will need rehab at discharge  The patient's AM-PAC Basic Mobility Inpatient Short Form Raw Score is 14  A Raw score of less than or equal to 17suggests the patient may benefit from discharge to post-acute rehabilitation services  Please also refer to the recommendation of the Physical Therapist for safe discharge planning  Barriers to Discharge: Inaccessible home environment, Decreased caregiver support     PT Discharge Recommendation: Post acute rehabilitation services    See flowsheet documentation for full assessment

## 2023-02-07 NOTE — OCCUPATIONAL THERAPY NOTE
Occupational Therapy Tx Note     Patient Name: Jesus Villa Date: 2/7/2023  Problem List  Principal Problem:    Primary osteoarthritis of left knee            02/07/23 1026   OT Last Visit   OT Visit Date 02/07/23   Note Type   Note Type Treatment   Pain Assessment   Pain Assessment Tool 0-10   Pain Score 5   Pain Location/Orientation Orientation: Left; Location: Knee   Hospital Pain Intervention(s) Rest   Restrictions/Precautions   Weight Bearing Precautions Per Order Yes   LLE Weight Bearing Per Order WBAT   Other Precautions Fall Risk;Pain; Chair Alarm   ADL   LB Dressing Assistance 3  Moderate Assistance   LB Dressing Deficit Don/doff L sock   Bed Mobility   Supine to Sit 5  Supervision   Additional items Increased time required;Verbal cues;LE management   Transfers   Sit to Stand 4  Minimal assistance   Additional items Assist x 2;Verbal cues;Armrests   Stand to Sit 4  Minimal assistance   Additional items Assist x 1;Verbal cues   Additional Comments BP closely monitored t/o  Supine /55  Seated /74  Standing /59  S/p marching in placed BP 92/60  Seated in recliner 97/57   Functional Mobility   Functional Mobility 4  Minimal assistance   Additional Comments 1-2, RW   Subjective   Subjective Pt received in supine position  Pt agreeable to session  Cognition   Overall Cognitive Status WFL   Arousal/Participation Alert   Attention Within functional limits   Orientation Level Oriented X4   Memory Within functional limits   Following Commands Follows one step commands without difficulty   Activity Tolerance   Activity Tolerance Patient limited by pain  (limited by orthostatic hypotension)   Assessment   Assessment Pt seen for OT tx session with focus on functional balance, functional mobility, ADL status, and transfer safety  Patient agreeable to OT treatment session  Pt received supine in bed  Performed bed mobility with supervision  VC required to manage LLE   Performed transfers with min A x 1-2  Pt continues to be limited by low BP as well as orthostatic hypotension  Patient continues to be functioning below baseline level, occupational performance remains limited secondary to factors listed above, and pt at increased risk for falls and injury  The patient's raw score on the AM-PAC Daily Activity inpatient short form is 18, standardized score is 38 66, less than 39 4  Patients at this level are likely to benefit from DC to post-acute rehabilitation services  Please refer to the recommendation of the Occupational Therapist for safe DC planning  From OT standpoint, recommendation at time of d/c would be Short Term Rehab  Patient to benefit from continued Occupational Therapy treatment while in the hospital to address deficits as defined above and maximize level of functional independence with ADLs and functional mobility  Pt left with call bell in reach, tray table in reach, needs met, chair alarm activated, SCDs on  Plan   Treatment Interventions ADL retraining;Functional transfer training;Patient/family training;Equipment evaluation/education; Compensatory technique education   Goal Expiration Date 02/16/23   OT Treatment Day 1   OT Frequency 3-5x/wk   Recommendation   OT Discharge Recommendation Post acute rehabilitation services   AM-[facility] Daily Activity Inpatient   Lower Body Dressing 2   Bathing 2   Toileting 2   Upper Body Dressing 4   Grooming 4   Eating 4   Daily Activity Raw Score 18   Daily Activity Standardized Score (Calc for Raw Score >=11) 38 66   AM-PAC Applied Cognition Inpatient   Following a Speech/Presentation 4   Understanding Ordinary Conversation 4   Taking Medications 4   Remembering Where Things Are Placed or Put Away 4   Remembering List of 4-5 Errands 4   Taking Care of Complicated Tasks 4   Applied Cognition Raw Score 24   Applied Cognition Standardized Score 62 21   End of Consult   Patient Position at End of Consult Bedside chair; All needs within reach;Bed/Chair alarm activated   Nurse Communication Nurse aware of consult             Marcelo Mcmahan, OTR/L

## 2023-02-07 NOTE — PLAN OF CARE
Problem: OCCUPATIONAL THERAPY ADULT  Goal: Performs self-care activities at highest level of function for planned discharge setting  See evaluation for individualized goals  Description: Treatment Interventions: ADL retraining, Functional transfer training, Patient/family training, Equipment evaluation/education, Compensatory technique education          See flowsheet documentation for full assessment, interventions and recommendations  Outcome: Progressing  Note: Limitation: Decreased ADL status, Decreased self-care trans, Decreased high-level ADLs     Assessment: Pt seen for OT tx session with focus on functional balance, functional mobility, ADL status, and transfer safety  Patient agreeable to OT treatment session  Pt received supine in bed  Performed bed mobility with supervision  VC required to manage LLE  Performed transfers with min A x 1-2  Pt continues to be limited by low BP as well as orthostatic hypotension  Patient continues to be functioning below baseline level, occupational performance remains limited secondary to factors listed above, and pt at increased risk for falls and injury  The patient's raw score on the AM-PAC Daily Activity inpatient short form is 18, standardized score is 38 66, less than 39 4  Patients at this level are likely to benefit from DC to post-acute rehabilitation services  Please refer to the recommendation of the Occupational Therapist for safe DC planning  From OT standpoint, recommendation at time of d/c would be Short Term Rehab  Patient to benefit from continued Occupational Therapy treatment while in the hospital to address deficits as defined above and maximize level of functional independence with ADLs and functional mobility   Pt left with call bell in reach, tray table in reach, needs met, chair alarm activated, SCDs on      OT Discharge Recommendation: Post acute rehabilitation services

## 2023-02-08 ENCOUNTER — APPOINTMENT (OUTPATIENT)
Dept: NON INVASIVE DIAGNOSTICS | Facility: HOSPITAL | Age: 58
End: 2023-02-08

## 2023-02-08 ENCOUNTER — APPOINTMENT (OUTPATIENT)
Dept: RADIOLOGY | Facility: HOSPITAL | Age: 58
End: 2023-02-08

## 2023-02-08 PROBLEM — D64.9 ANEMIA: Status: ACTIVE | Noted: 2023-02-08

## 2023-02-08 PROBLEM — E66.9 OBESITY (BMI 30-39.9): Status: ACTIVE | Noted: 2023-02-08

## 2023-02-08 PROBLEM — R79.89 ELEVATED LACTIC ACID LEVEL: Status: ACTIVE | Noted: 2023-02-08

## 2023-02-08 PROBLEM — R65.10 SIRS (SYSTEMIC INFLAMMATORY RESPONSE SYNDROME) (HCC): Status: ACTIVE | Noted: 2023-02-08

## 2023-02-08 LAB
ALBUMIN SERPL BCP-MCNC: 2.8 G/DL (ref 3.5–5)
ALP SERPL-CCNC: 63 U/L (ref 46–116)
ALT SERPL W P-5'-P-CCNC: 19 U/L (ref 12–78)
ANION GAP SERPL CALCULATED.3IONS-SCNC: 4 MMOL/L (ref 4–13)
ANION GAP SERPL CALCULATED.3IONS-SCNC: 5 MMOL/L (ref 4–13)
AST SERPL W P-5'-P-CCNC: 19 U/L (ref 5–45)
BACTERIA UR QL AUTO: ABNORMAL /HPF
BASOPHILS # BLD AUTO: 0.05 THOUSANDS/ÂΜL (ref 0–0.1)
BASOPHILS # BLD AUTO: 0.06 THOUSANDS/ÂΜL (ref 0–0.1)
BASOPHILS NFR BLD AUTO: 1 % (ref 0–1)
BASOPHILS NFR BLD AUTO: 1 % (ref 0–1)
BILIRUB SERPL-MCNC: 0.4 MG/DL (ref 0.2–1)
BILIRUB UR QL STRIP: NEGATIVE
BUN SERPL-MCNC: 5 MG/DL (ref 5–25)
BUN SERPL-MCNC: 7 MG/DL (ref 5–25)
CALCIUM ALBUM COR SERPL-MCNC: 9.5 MG/DL (ref 8.3–10.1)
CALCIUM SERPL-MCNC: 8.5 MG/DL (ref 8.3–10.1)
CALCIUM SERPL-MCNC: 8.5 MG/DL (ref 8.3–10.1)
CHLORIDE SERPL-SCNC: 105 MMOL/L (ref 96–108)
CHLORIDE SERPL-SCNC: 105 MMOL/L (ref 96–108)
CLARITY UR: CLEAR
CO2 SERPL-SCNC: 28 MMOL/L (ref 21–32)
CO2 SERPL-SCNC: 31 MMOL/L (ref 21–32)
COLOR UR: ABNORMAL
CREAT SERPL-MCNC: 0.42 MG/DL (ref 0.6–1.3)
CREAT SERPL-MCNC: 0.57 MG/DL (ref 0.6–1.3)
EOSINOPHIL # BLD AUTO: 0.06 THOUSAND/ÂΜL (ref 0–0.61)
EOSINOPHIL # BLD AUTO: 0.13 THOUSAND/ÂΜL (ref 0–0.61)
EOSINOPHIL NFR BLD AUTO: 1 % (ref 0–6)
EOSINOPHIL NFR BLD AUTO: 2 % (ref 0–6)
ERYTHROCYTE [DISTWIDTH] IN BLOOD BY AUTOMATED COUNT: 12.9 % (ref 11.6–15.1)
ERYTHROCYTE [DISTWIDTH] IN BLOOD BY AUTOMATED COUNT: 12.9 % (ref 11.6–15.1)
GFR SERPL CREATININE-BSD FRML MDRD: 104 ML/MIN/1.73SQ M
GFR SERPL CREATININE-BSD FRML MDRD: 115 ML/MIN/1.73SQ M
GLUCOSE SERPL-MCNC: 105 MG/DL (ref 65–140)
GLUCOSE SERPL-MCNC: 93 MG/DL (ref 65–140)
GLUCOSE UR STRIP-MCNC: NEGATIVE MG/DL
HCT VFR BLD AUTO: 25 % (ref 34.8–46.1)
HCT VFR BLD AUTO: 25.2 % (ref 34.8–46.1)
HCT VFR BLD AUTO: 26.2 % (ref 34.8–46.1)
HCT VFR BLD AUTO: 27 % (ref 34.8–46.1)
HCT VFR BLD AUTO: 28.5 % (ref 34.8–46.1)
HGB BLD-MCNC: 8.3 G/DL (ref 11.5–15.4)
HGB BLD-MCNC: 8.4 G/DL (ref 11.5–15.4)
HGB BLD-MCNC: 8.7 G/DL (ref 11.5–15.4)
HGB BLD-MCNC: 9 G/DL (ref 11.5–15.4)
HGB BLD-MCNC: 9.4 G/DL (ref 11.5–15.4)
HGB UR QL STRIP.AUTO: NEGATIVE
IMM GRANULOCYTES # BLD AUTO: 0.02 THOUSAND/UL (ref 0–0.2)
IMM GRANULOCYTES # BLD AUTO: 0.02 THOUSAND/UL (ref 0–0.2)
IMM GRANULOCYTES NFR BLD AUTO: 0 % (ref 0–2)
IMM GRANULOCYTES NFR BLD AUTO: 0 % (ref 0–2)
INR PPP: 0.99 (ref 0.84–1.19)
KETONES UR STRIP-MCNC: NEGATIVE MG/DL
LACTATE SERPL-SCNC: 0.5 MMOL/L (ref 0.5–2)
LACTATE SERPL-SCNC: 2.4 MMOL/L (ref 0.5–2)
LEUKOCYTE ESTERASE UR QL STRIP: NEGATIVE
LYMPHOCYTES # BLD AUTO: 2.23 THOUSANDS/ÂΜL (ref 0.6–4.47)
LYMPHOCYTES # BLD AUTO: 2.34 THOUSANDS/ÂΜL (ref 0.6–4.47)
LYMPHOCYTES NFR BLD AUTO: 28 % (ref 14–44)
LYMPHOCYTES NFR BLD AUTO: 32 % (ref 14–44)
MCH RBC QN AUTO: 30.4 PG (ref 26.8–34.3)
MCH RBC QN AUTO: 30.6 PG (ref 26.8–34.3)
MCHC RBC AUTO-ENTMCNC: 32.9 G/DL (ref 31.4–37.4)
MCHC RBC AUTO-ENTMCNC: 33.2 G/DL (ref 31.4–37.4)
MCV RBC AUTO: 92 FL (ref 82–98)
MCV RBC AUTO: 93 FL (ref 82–98)
MONOCYTES # BLD AUTO: 0.53 THOUSAND/ÂΜL (ref 0.17–1.22)
MONOCYTES # BLD AUTO: 0.64 THOUSAND/ÂΜL (ref 0.17–1.22)
MONOCYTES NFR BLD AUTO: 7 % (ref 4–12)
MONOCYTES NFR BLD AUTO: 8 % (ref 4–12)
NEUTROPHILS # BLD AUTO: 4.37 THOUSANDS/ÂΜL (ref 1.85–7.62)
NEUTROPHILS # BLD AUTO: 5.04 THOUSANDS/ÂΜL (ref 1.85–7.62)
NEUTS SEG NFR BLD AUTO: 59 % (ref 43–75)
NEUTS SEG NFR BLD AUTO: 61 % (ref 43–75)
NITRITE UR QL STRIP: NEGATIVE
NON-SQ EPI CELLS URNS QL MICRO: ABNORMAL /HPF
NRBC BLD AUTO-RTO: 0 /100 WBCS
NRBC BLD AUTO-RTO: 0 /100 WBCS
PH UR STRIP.AUTO: 8.5 [PH]
PLATELET # BLD AUTO: 194 THOUSANDS/UL (ref 149–390)
PLATELET # BLD AUTO: 207 THOUSANDS/UL (ref 149–390)
PMV BLD AUTO: 9.1 FL (ref 8.9–12.7)
PMV BLD AUTO: 9.3 FL (ref 8.9–12.7)
POTASSIUM SERPL-SCNC: 3.4 MMOL/L (ref 3.5–5.3)
POTASSIUM SERPL-SCNC: 3.6 MMOL/L (ref 3.5–5.3)
PROCALCITONIN SERPL-MCNC: <0.05 NG/ML
PROT SERPL-MCNC: 5.5 G/DL (ref 6.4–8.4)
PROT UR STRIP-MCNC: NEGATIVE MG/DL
PROTHROMBIN TIME: 13.8 SECONDS (ref 11.6–14.5)
RBC # BLD AUTO: 2.71 MILLION/UL (ref 3.81–5.12)
RBC # BLD AUTO: 2.86 MILLION/UL (ref 3.81–5.12)
RBC #/AREA URNS AUTO: ABNORMAL /HPF
SODIUM SERPL-SCNC: 138 MMOL/L (ref 135–147)
SODIUM SERPL-SCNC: 140 MMOL/L (ref 135–147)
SP GR UR STRIP.AUTO: 1.02 (ref 1–1.03)
UROBILINOGEN UR STRIP-ACNC: <2 MG/DL
WBC # BLD AUTO: 7.37 THOUSAND/UL (ref 4.31–10.16)
WBC # BLD AUTO: 8.12 THOUSAND/UL (ref 4.31–10.16)
WBC #/AREA URNS AUTO: ABNORMAL /HPF

## 2023-02-08 RX ORDER — OXYCODONE HYDROCHLORIDE 5 MG/1
5 TABLET ORAL EVERY 4 HOURS PRN
Qty: 30 TABLET | Refills: 0 | Status: SHIPPED | OUTPATIENT
Start: 2023-02-08 | End: 2023-02-13 | Stop reason: SDUPTHER

## 2023-02-08 RX ORDER — POTASSIUM CHLORIDE 20 MEQ/1
40 TABLET, EXTENDED RELEASE ORAL 2 TIMES DAILY
Status: COMPLETED | OUTPATIENT
Start: 2023-02-08 | End: 2023-02-08

## 2023-02-08 RX ORDER — CEFEPIME HYDROCHLORIDE 2 G/50ML
2000 INJECTION, SOLUTION INTRAVENOUS EVERY 8 HOURS
Status: DISCONTINUED | OUTPATIENT
Start: 2023-02-08 | End: 2023-02-09

## 2023-02-08 RX ORDER — ALBUMIN, HUMAN INJ 5% 5 %
25 SOLUTION INTRAVENOUS ONCE
Status: COMPLETED | OUTPATIENT
Start: 2023-02-08 | End: 2023-02-08

## 2023-02-08 RX ORDER — ACETAMINOPHEN 325 MG/1
975 TABLET ORAL EVERY 8 HOURS
Qty: 60 TABLET | Refills: 1 | Status: SHIPPED | OUTPATIENT
Start: 2023-02-08 | End: 2023-02-13 | Stop reason: SDUPTHER

## 2023-02-08 RX ORDER — ASPIRIN 81 MG/1
81 TABLET ORAL 2 TIMES DAILY
Qty: 60 TABLET | Refills: 0 | Status: SHIPPED | OUTPATIENT
Start: 2023-02-08 | End: 2023-02-13 | Stop reason: SDUPTHER

## 2023-02-08 RX ADMIN — SODIUM CHLORIDE, SODIUM LACTATE, POTASSIUM CHLORIDE, AND CALCIUM CHLORIDE 125 ML/HR: .6; .31; .03; .02 INJECTION, SOLUTION INTRAVENOUS at 07:03

## 2023-02-08 RX ADMIN — CEFEPIME HYDROCHLORIDE 2000 MG: 2 INJECTION, SOLUTION INTRAVENOUS at 02:45

## 2023-02-08 RX ADMIN — SODIUM CHLORIDE, SODIUM LACTATE, POTASSIUM CHLORIDE, AND CALCIUM CHLORIDE 100 ML/HR: .6; .31; .03; .02 INJECTION, SOLUTION INTRAVENOUS at 16:20

## 2023-02-08 RX ADMIN — ACETAMINOPHEN 975 MG: 325 TABLET, FILM COATED ORAL at 00:12

## 2023-02-08 RX ADMIN — DOCUSATE SODIUM 100 MG: 100 CAPSULE, LIQUID FILLED ORAL at 17:25

## 2023-02-08 RX ADMIN — STANDARDIZED SENNA CONCENTRATE 8.6 MG: 8.6 TABLET ORAL at 08:12

## 2023-02-08 RX ADMIN — GABAPENTIN 100 MG: 100 CAPSULE ORAL at 02:45

## 2023-02-08 RX ADMIN — CEFEPIME HYDROCHLORIDE 2000 MG: 2 INJECTION, SOLUTION INTRAVENOUS at 17:24

## 2023-02-08 RX ADMIN — ACETAMINOPHEN 975 MG: 325 TABLET, FILM COATED ORAL at 17:25

## 2023-02-08 RX ADMIN — ALBUMIN (HUMAN) 25 G: 12.5 INJECTION, SOLUTION INTRAVENOUS at 00:15

## 2023-02-08 RX ADMIN — CYCLOBENZAPRINE HYDROCHLORIDE 5 MG: 5 TABLET, FILM COATED ORAL at 17:25

## 2023-02-08 RX ADMIN — CEFEPIME HYDROCHLORIDE 2000 MG: 2 INJECTION, SOLUTION INTRAVENOUS at 08:31

## 2023-02-08 RX ADMIN — OXYCODONE HYDROCHLORIDE AND ACETAMINOPHEN 500 MG: 500 TABLET ORAL at 08:10

## 2023-02-08 RX ADMIN — POTASSIUM CHLORIDE 40 MEQ: 1500 TABLET, EXTENDED RELEASE ORAL at 08:13

## 2023-02-08 RX ADMIN — MULTIPLE VITAMINS W/ MINERALS TAB 1 TABLET: TAB ORAL at 08:13

## 2023-02-08 RX ADMIN — SODIUM CHLORIDE, SODIUM LACTATE, POTASSIUM CHLORIDE, AND CALCIUM CHLORIDE 1000 ML: .6; .31; .03; .02 INJECTION, SOLUTION INTRAVENOUS at 01:52

## 2023-02-08 RX ADMIN — ACETAMINOPHEN 975 MG: 325 TABLET, FILM COATED ORAL at 06:57

## 2023-02-08 RX ADMIN — DOCUSATE SODIUM 100 MG: 100 CAPSULE, LIQUID FILLED ORAL at 08:12

## 2023-02-08 RX ADMIN — OXYCODONE HYDROCHLORIDE AND ACETAMINOPHEN 500 MG: 500 TABLET ORAL at 17:25

## 2023-02-08 RX ADMIN — GABAPENTIN 100 MG: 100 CAPSULE ORAL at 17:25

## 2023-02-08 RX ADMIN — FERROUS SULFATE TAB 325 MG (65 MG ELEMENTAL FE) 325 MG: 325 (65 FE) TAB at 06:57

## 2023-02-08 RX ADMIN — HYDROXYCHLOROQUINE SULFATE 200 MG: 200 TABLET, FILM COATED ORAL at 07:01

## 2023-02-08 RX ADMIN — FOLIC ACID 1 MG: 1 TABLET ORAL at 08:13

## 2023-02-08 RX ADMIN — CYCLOBENZAPRINE HYDROCHLORIDE 5 MG: 5 TABLET, FILM COATED ORAL at 08:10

## 2023-02-08 RX ADMIN — CYCLOBENZAPRINE HYDROCHLORIDE 5 MG: 5 TABLET, FILM COATED ORAL at 20:41

## 2023-02-08 RX ADMIN — ENOXAPARIN SODIUM 30 MG: 100 INJECTION SUBCUTANEOUS at 08:15

## 2023-02-08 RX ADMIN — TRAMADOL HYDROCHLORIDE 50 MG: 50 TABLET ORAL at 17:25

## 2023-02-08 RX ADMIN — ENOXAPARIN SODIUM 30 MG: 100 INJECTION SUBCUTANEOUS at 20:41

## 2023-02-08 RX ADMIN — MENTHOL, METHYL SALICYLATE: 10; 15 CREAM TOPICAL at 08:17

## 2023-02-08 RX ADMIN — POTASSIUM CHLORIDE 40 MEQ: 1500 TABLET, EXTENDED RELEASE ORAL at 17:25

## 2023-02-08 RX ADMIN — GABAPENTIN 100 MG: 100 CAPSULE ORAL at 08:49

## 2023-02-08 RX ADMIN — FERROUS SULFATE TAB 325 MG (65 MG ELEMENTAL FE) 325 MG: 325 (65 FE) TAB at 17:25

## 2023-02-08 RX ADMIN — PANTOPRAZOLE SODIUM 40 MG: 40 TABLET, DELAYED RELEASE ORAL at 08:12

## 2023-02-08 RX ADMIN — TRAMADOL HYDROCHLORIDE 50 MG: 50 TABLET ORAL at 12:43

## 2023-02-08 NOTE — ASSESSMENT & PLAN NOTE
· Hemoglobin 9 0 (2/8 0051)  · 9 9 (2/7 0500)  · 13 0 (1/17)  · BP soft in the 97'C systolic  No signs of active blood loss  Recent orthopedic procedure on 2/7     · Type and Screen obtained 2/6  · If Bps continue to be soft despite fluid resuscitation will consider blood transfusion   · Monitor H&H every 8 hours

## 2023-02-08 NOTE — ASSESSMENT & PLAN NOTE
· POD 2 left arthroplasty of left knee   · PT/OT eval  · Manage pain   · Rest of treatment per primary

## 2023-02-08 NOTE — QUICK NOTE
Asked to see patient in consult due to soft BP's in the 90s and low grade fever  Orders placed for H&H, lactic, BC, CBC, CMP  IV fluids increased to 125 ml/hr  Pt seen and resting in bed  Reports ongoing pain in her left knee and mild lightheadedness when not laying down  Hg 9 9 morning of 2/7/23  Had been 13 0 1/17/23  Unclear if this hemoglobin drop occurred prior to procedure or after

## 2023-02-08 NOTE — PROGRESS NOTES
The patient removed her foot pumps and RADHA stockings herself in the middle of the night  I attempted to educate her about the benefits of both of these, and she was agreeable to putting them back on, but she was complaining of pain and refused to have them replaced at this time

## 2023-02-08 NOTE — ASSESSMENT & PLAN NOTE
· Lactic 2 4, 2 hour pending   · Not meeting SIRS but is borderline  · Tachycardia (90s), temp 481 3, BP 75Y systolic   · Order BC   · Start abx  Unclear if infectious at this time

## 2023-02-08 NOTE — PROGRESS NOTES
Do Hernández  56 y o   female  MR#: [id number]  2/8/2023    Post-op days: 2  Extremity: left knee    Subjective: Patient seen and examined  Lying comfortably in bed  Nursing notes that overnight she developed increase in her left knee pain with an increase in swelling and bruising  Pain is controlled with medication currently  She denies any chest pain, shortness of breath, fevers or chills  She does complain of some nausea this morning and a headache  She has been experiencing low blood pressures overnight  SLIM was consulted overnight due to hypotension  She has not been compliant with her SCDs or Teds recently due to discomfort in the leg  Vitals:   Vitals:    02/08/23 0653   BP: 116/58   Pulse: 84   Resp: 18   Temp: (!) 100 8 °F (38 2 °C)   SpO2: 93%       Exam:   A&O x 3 NAD  Left knee:  Mepilex dressing intact  Small area of sanguinous drainage at mid dressing  Thigh and calf compartments soft, compressible  Moderate swelling of knee  +ecchymosis about the knee  Actively moving ankle and toes  DP 2+  Sensation intact to light touch  Calf is soft, compressible, and non-tender to palpation  Labs:   WBC   Recent Labs     02/08/23  0011 02/08/23  0443   WBC 8 12 7 37     H/H   Recent Labs     02/07/23  0517 02/08/23  0011 02/08/23  0044 02/08/23  0443   HGB 9 9* 8 7* 9 0* 8 3*   /  Recent Labs     02/07/23  0517 02/08/23  0011 02/08/23  0044 02/08/23  0443   HCT 30 3* 26 2* 27 0* 25 2*     Sed Rate No results for input(s): SEDRATE in the last 72 hours  CRP No results for input(s): CRP in the last 72 hours  Assessment:   S/p left TKA    Plan:   Weightbearing as tolerated to left lower extremity with assistance  PT/OT  Pain control as needed  DVT prophylaxis: Lovenox and mechanical   Will discharge home on aspirin 81 mg twice daily x6 weeks  ABLA: Hbg 8 3 this AM, down from 9 9  Continue to monitor vitals and H/H  SLIM on board due to hypotension overnight    Continue knee-high compression stocking during the day, continue with SCDs  Ice and elevation to left knee  Absolutely no pillows or rolled up towels underneath left knee  Keep pillow underneath ankle for full extension of knee  DC planning: Plan on dc to rehab once her BP is stabilized and she is cleared by PT/OT  Follow-up as outpatient as scheduled with Dr Dakota Nugent in 10 to 14 days        Josetta Cowden, PA-C

## 2023-02-08 NOTE — PLAN OF CARE
Problem: PHYSICAL THERAPY ADULT  Goal: Performs mobility at highest level of function for planned discharge setting  See evaluation for individualized goals  Description: Treatment/Interventions: Functional transfer training, LE strengthening/ROM, Elevations, Therapeutic exercise, Endurance training, Patient/family training, Equipment eval/education, Bed mobility, Gait training, Spoke to nursing, OT          See flowsheet documentation for full assessment, interventions and recommendations  Outcome: Progressing  Note: Prognosis: Guarded  Problem List: Decreased strength, Decreased range of motion, Decreased endurance, Impaired balance, Decreased mobility, Obesity, Pain  Assessment: Patient received supine in bed with purewick in place  RN reports that patient has low BP overnight  BP stable during session  Advised patient to attempt to use commode instead of purewick  Patient thoroughly educated on importance of OOB activity  Patient required min A for transfers and amb  She did progress with increased ambulation distance today  She continues to be limited by pain  She is also reluctant to bend the knee due to pain-education provided  Patient is deconditioned and is not at her baseline  She is at risk for falls  Recommending rehab  The patient's AM-PAC Basic Mobility Inpatient Short Form Raw Score is 14  A Raw score of less than or equal to 17 suggests the patient may benefit from discharge to post-acute rehabilitation services  Please also refer to the recommendation of the Physical Therapist for safe discharge planning  Barriers to Discharge: Inaccessible home environment, Decreased caregiver support     PT Discharge Recommendation: Post acute rehabilitation services    See flowsheet documentation for full assessment

## 2023-02-08 NOTE — UTILIZATION REVIEW
Initial Clinical Review    Elective outpatient  surgical procedure CONVERTED TO INPATIENT ADMISSION DUE TO NEED FOR CONTINUED POST OP STAY  ON POST OP DAY 2 WITH CONCERN AS HAS TEMP  8,  BLOOD CULTURES DONE AND ANTIBIOTICS STARTED; BP SOFT/HYPOTENSIVE REQUIRING IVF BOLUSES    Age/Sex: 56 y o  female     Surgery Date: 2/6/23     Procedure: Left - ARTHROPLASTY KNEE TOTAL    Anesthesia: Spinal, sedation, Adductor canal block    Operative Findings:   The patient appeared to have grade 4 changes in PF and medial compartments    POD#1 Progress Note:   2/7/23 Patient is POD #1 s/p left total knee arthroplasty  Pain is controlled  On exam: left knee mepilex dressing intact  Small area of sanguinous drainage at mid dressing  Moderate swelling of knee  H&H 9 9/30 3 and was 13/39 3 on 1/17/23  Plan is wbat LLE with assistance  Pain control  DVT prophylaxis with Lovenox and mechanical   Monitor H&H  Knee high compression stocking  Ice and elevation to left knee  PT/OT    POD#2 CHANGED TO INPATIENT  Progress Note:   2/8/23 Patient is POD #2 s/p left total knee arthroplasty  BP soft- overnight given IV albumin, since OR multiple IVF boluses  Lightheaded with ambulation  Low grade temp  Overnight  developed increase in her left knee pain with an increase in swelling and bruising  On exam left knee:Mepilex dressing intact  Small area of sanguinous drainage at mid dressing  Thigh and calf compartments soft, compressible  Moderate swelling of knee  +ecchymosis about the knee  Hgb 8  3   hct 25 2  Plan is consult medicine due to hypotension  Not using SCDs or Teds due to discomfort  Continue PT/OT  WBAT  Pain control  DVT prophylaxis: Lovenox and mechanical   Trend H&H  Ice and elevation to left knee  Continue IVF  Per medicine 2/8/23:  Patient is POD # 2 and has primary OA of knee s/p left total knee arthroplasty  Has Elevated lactic acid, SBP 90s  No signs of active blood loss   Continue IVF, monitor H&H every 8 hours  Obtain blood cultures  Start antibiotics  POD#3 Progress Note:   2/9/23 Patient is POD #3 s/p left total knee arthroplasty  Left knee painful  BP improved  On exam left knee: Mepilex dressing intact  Small area of sanguinous drainage at mid dressing  Thigh and calf compartments soft, compressible  H&H 8 9/26 9  Continue pain control  Cefepime dc  IVF dc   PT/OT  Monitor BP, trend H&H    Admission Orders: Date/Time/Statement:  2/6/23 0935 Outpatient no charge bed  And CHANGED 2/8/23 1622 INPATIENT RE: CONTINUED POST OP STAY  ON POST OP DAY 2 WITH CONCERN AS HAS TEMP  8,  BLOOD CULTURES DONE AND ANTIBIOTICS STARTED; BP SOFT/HYPOTENSIVE REQUIRING IVF BOLUSES  Admission Orders (From admission, onward)     Ordered        02/08/23 1622  Inpatient Admission  Once                      Orders Placed This Encounter   Procedures   • Inpatient Admission     Standing Status:   Standing     Number of Occurrences:   1     Order Specific Question:   Level of Care     Answer:   Med Surg [16]     Order Specific Question:   Estimated length of stay     Answer:   More than 2 Midnights     Order Specific Question:   Certification     Answer:   I certify that inpatient services are medically necessary for this patient for a duration of greater than two midnights  See H&P and MD Progress Notes for additional information about the patient's course of treatment       Vital Signs: /59 (BP Location: Left arm)   Pulse 71   Temp 98 9 °F (37 2 °C) (Oral)   Resp 20   Ht 5' (1 524 m)   Wt 73 6 kg (162 lb 3 2 oz)   SpO2 94%   BMI 31 68 kg/m²    02/09/23 0716 98 9 °F (37 2 °C) 71 20 118/59 83 94 % None (Room air) Lying   02/09/23 0533 98 6 °F (37 °C) 68 16 110/55 75 93 % None (Room air) Lying   02/09/23 0236 99 3 °F (37 4 °C) 78 16 111/57 80 97 % None (Room air) Lying   02/08/23 2257 98 3 °F (36 8 °C) 72 16 102/59 75 96 % None (Room air) Lying   02/08/23 1941 98 5 °F (36 9 °C) 80 16 103/58 77 94 % None (Room air)      02/08/23 1100 99 8 °F (37 7 °C) 86 18 114/53 77 98 % -- Lying   02/08/23 0653 100 8 °F (38 2 °C) Abnormal  84 18 116/58 79 93 % None (Room air) Lying   02/08/23 0522 99 7 °F (37 6 °C) 88 18 102/59 78 91 % None (Room air) Lying   02/08/23 0342 99 1 °F (37 3 °C) 87 18 99/57 73 92 % None (Room air) Lying   02/08/23 0227 99 8 °F (37 7 °C) 88 18 106/58 78 95 % None (Room air) Lying   02/08/23 0059 99 5 °F (37 5 °C) 94 -- 92/57 69 -- -- Lying   02/08/23 0031 -- -- -- 90/49 Abnormal  67 -- -- Lying   02/07/23 2336 100 1 °F (37 8 °C) 84 -- 92/55 70 -- -- Lying   02/07/23 2234 99 9 °F (37 7 °C) 82 18 116/63 84 95 % None (Room air) Lying   02/07/23 2135 -- 74 14 123/58 84 96 % None (Room air) Lying   02/07/23 1959 98 8 °F (37 1 °C) 73 16 92/53 66 94 % None (Room air) Lying   02/07/23 1500 98 2 °F (36 8 °C) 73 14 105/55 76 92 % None (Room air) Lying   02/07/23 1025 -- -- -- 97/65 -- -- -- Sitting - Orthostatic VS   02/07/23 1020 -- -- -- 97/57 -- -- -- Sitting - Orthostatic VS   02/07/23 1015 -- -- -- 92/60 -- -- -- Standing - Orthostatic VS   02/07/23 1010 -- -- -- 112/72 -- -- -- Sitting - Orthostatic VS   02/07/23 1005 -- -- -- 100/51 -- -- -- Standing - Orthostatic VS   02/07/23 1000 -- -- -- 107/55 -- -- -- Lying - Orthostatic VS   02/07/23 0800 98 1 °F (36 7 °C) 73 16 95/58 70 92 % None (Room air)      02/06/23 2015 97 6 °F (36 4 °C) 51 Abnormal  14 93/54 67 95 % None (Room air) Lying   02/06/23 1652 97 7 °F (36 5 °C) 49 Abnormal  16 113/73 89 100 % None (Room air) Lying   02/06/23 1616 98 3 °F (36 8 °C) 50 Abnormal  12 119/64 89 99 % None (Room air      02/06/23 1046 -- -- -- 102/58 80 -- -- --   02/06/23 1031 98 6 °F (37 °C) 58 12 98/59 72 97 % None (Room air) --   02/06/23 1016 -- 58 13 95/54 70 95 % -- --   02/06/23 1001 98 °F (36 7 °C) 64 13 82/47 Abnormal  62 Abnormal  95 % None (Room air) --   Comment rows:   OBSERV: albumin being given   will continue to monitor   at 02/06/23 1001   02/06/23 0946 -- 58 17 83/49 Abnormal  65 96 % -- --   02/06/23 0945 -- 58 22 82/48 Abnormal  62 Abnormal  95 % -- --   Comment rows:   OBSERV: fluids wide open   Will continue to monitor  at 02/06/23 0945   02/06/23 0931 98 3 °F (36 8 °C) 64 16 83/43 Abnormal  60 Abnormal  96 % None (Room air)      Pertinent Labs/Diagnostic Test Results:   VAS lower limb venous duplex study, complete bilateral   Final Result by Christopher Roblero DO (02/08 1352)      XR chest portable   Final Result by Daya Tucker MD (02/08 1629)      No acute cardiopulmonary disease                    Workstation performed: [id number]             Results from last 7 days   Lab Units 02/09/23  0459 02/08/23  2121 02/08/23  1259 02/08/23  0443 02/08/23  0044 02/08/23  0011   WBC Thousand/uL 7 56  --   --  7 37  --  8 12   HEMOGLOBIN g/dL 8 9* 8 4* 9 4* 8 3* 9 0* 8 7*   HEMATOCRIT % 26 9* 25 0* 28 5* 25 2* 27 0* 26 2*   PLATELETS Thousands/uL 220  --   --  194  --  207   NEUTROS ABS Thousands/µL 3 86  --   --  4 37  --  5 04     Results from last 7 days   Lab Units 02/09/23  0459 02/08/23  0443 02/08/23  0011 02/07/23  0517 02/06/23  1736   SODIUM mmol/L 139 140 138 141 139   POTASSIUM mmol/L 4 0 3 4* 3 6 3 8 3 9   CHLORIDE mmol/L 106 105 105 106 105   CO2 mmol/L 28 31 28 29 29   ANION GAP mmol/L 5 4 5 6 5   BUN mg/dL 7 5 7 8 7   CREATININE mg/dL 0 56* 0 42* 0 57* 0 61 0 55*   EGFR ml/min/1 73sq m 105 115 104 102 105   CALCIUM mg/dL 7 8* 8 5 8 5 9 4 8 2*   MAGNESIUM mg/dL 1 8  --   --   --   --      Results from last 7 days   Lab Units 02/09/23  0459 02/08/23  0011   AST U/L 22 19   ALT U/L 20 19   ALK PHOS U/L 57 63   TOTAL PROTEIN g/dL 6 2* 5 5*   ALBUMIN g/dL 3 0* 2 8*   TOTAL BILIRUBIN mg/dL 0 80 0 40   BILIRUBIN DIRECT mg/dL 0 16  --      Results from last 7 days   Lab Units 02/09/23  0459 02/08/23  0443 02/08/23  0011 02/07/23  0517 02/06/23  1736   GLUCOSE RANDOM mg/dL 87 93 105 100 140     Results from last 7 days   Lab Units 02/08/23  0011   PROTIME seconds 13 8   INR  0 99     Results from last 7 days   Lab Units 02/09/23  0459 02/08/23  0011   PROCALCITONIN ng/ml 0 05 <0 05     Results from last 7 days   Lab Units 02/08/23  0443 02/08/23  0011   LACTIC ACID mmol/L 0 5 2 4*     Results from last 7 days   Lab Units 02/08/23  0759   CLARITY UA  Clear   COLOR UA  Light Yellow   SPEC GRAV UA  1 020   PH UA  8 5*   GLUCOSE UA mg/dl Negative   KETONES UA mg/dl Negative   BLOOD UA  Negative   PROTEIN UA mg/dl Negative   NITRITE UA  Negative   BILIRUBIN UA  Negative   UROBILINOGEN UA (BE) mg/dl <2 0   LEUKOCYTES UA  Negative   WBC UA /hpf 0-1*   RBC UA /hpf None Seen   BACTERIA UA /hpf None Seen   EPITHELIAL CELLS WET PREP /hpf Occasional     Results from last 7 days   Lab Units 02/08/23  0148 02/08/23  0121   BLOOD CULTURE  No Growth at 24 hrs  No Growth at 24 hrs  Diet: regular    Mobility: up with assistance  PT/OT    DVT Prophylaxis: BIlateral SCDs  Lovenox  Medications/Pain Control:   Scheduled Medications:  acetaminophen, 975 mg, Oral, Q8H  ascorbic acid, 500 mg, Oral, BID  cefepime, 2,000 mg, Intravenous, Q8H c 0920 2/9/23   cyclobenzaprine, 5 mg, Oral, TID  docusate sodium, 100 mg, Oral, BID  enoxaparin, 30 mg, Subcutaneous, Q12H NOHEMI  ferrous sulfate, 325 mg, Oral, BID With Meals  folic acid, 1 mg, Oral, Daily  gabapentin, 100 mg, Oral, Q8H  hydroxychloroquine, 200 mg, Oral, Daily With Breakfast  menthol-methyl salicylate, , Apply externally, BID  multivitamin-minerals, 1 tablet, Oral, Daily  pantoprazole, 40 mg, Oral, Daily  potassium chloride, 40 mEq, Oral, BID  senna, 1 tablet, Oral, Daily  traMADol, 50 mg, Oral, Q6H NOHEMI    albumin human (FLEXBUMIN) 5 % injection 12 5 g  Dose: 12 5 g  Freq: Once Route: IV  Last Dose: Stopped (02/07/23 0645)  Start: 02/06/23 1015 End: 02/07/23 0645    albumin human (FLEXBUMIN) 5 % injection 25 g  Dose: 25 g  Freq:  Once Route: IV  Last Dose: Stopped (02/08/23 0100)  Start: 02/08/23 0015 End: 02/08/23 0100    bupivacaine liposomal (EXPAREL) 1 3 % injection 20 mL  Dose: 20 mL  Freq: Once Route: INFILTRATION  Indications of Use: LOCAL ANESTHESIA  Start: 02/06/23 0615 End: 02/06/23 0716    ceFAZolin (ANCEF) IVPB (premix in dextrose) 2,000 mg 50 mL  Dose: 2,000 mg  Freq: Every 8 hours Route: IV  Last Dose: Stopped (02/07/23 0645)  Start: 02/06/23 0945 End: 02/07/23 0645      lactated ringers bolus 1,000 mL  Dose: 1,000 mL  Freq: Once Route: IV  Last Dose: Stopped (02/08/23 0703)  Start: 02/08/23 0115 End: 02/08/23 0703    lactated ringers bolus 1,000 mL  Dose: 1,000 mL  Freq: Once Route: IV  Last Dose: Stopped (02/08/23 0703)  Start: 02/07/23 2030 End: 02/08/23 0703    Continuous IV Infusions:  lactated ringers, 100 mL/hr, Intravenous, Continuous  Dc 2/9/23 0723      PRN Meds:  albuterol, 2 puff, Inhalation, Q4H PRN  albuterol, 2 5 mg, Nebulization, Q6H PRN  aluminum-magnesium hydroxide-simethicone, 15 mL, Oral, Q6H PRN  bisacodyl, 10 mg, Rectal, Daily PRN  calcium carbonate, 1,000 mg, Oral, Daily PRN  lactated ringers, 1,000 mL, Intravenous, Once PRN x 1 2/7/23   ondansetron, 4 mg, Intravenous, Q4H PRN  oxyCODONE, 10 mg, Oral, Q4H PRN x 1 2/9  oxyCODONE, 5 mg, Oral, Q4H PRN x 1 2/7/23   simethicone, 80 mg, Oral, 4x Daily PRN      neurovascular checks every 4 hours  Incentive spirometer  Bilateral knee high compression stockings  Network Utilization Review Department  ATTENTION: Please call with any questions or concerns to [phone] and carefully listen to the prompts so that you are directed to the right person  All voicemails are confidential   Adrián Peres all requests for admission clinical reviews, approved or denied determinations and any other requests to dedicated fax number below belonging to the campus where the patient is receiving treatment   List of dedicated fax numbers for the Facilities:  FACILITY NAME SUKUMAR Flores 25 DENIALS (Administrative/Medical Necessity) [phone] [address] (Maternity/NICU/Pediatrics) [address] [place] [address] [phone]   [address] [place] [address] Leonila Rodríguez [address] [facility] 134 8[address] [facility] [phone]

## 2023-02-08 NOTE — CASE MANAGEMENT
Case Management Progress Note    Patient name Timo Urrutia  Location [address] 219/-01 MRN [id number]  : 1965 Date 2023       LOS (days): 0  Geometric Mean LOS (GMLOS) (days):   Days to GMLOS:        OBJECTIVE:        Current admission status: Outpatient Surgery  Preferred Pharmacy:   [id number] [address], [address]  Phone: [phone] Fax: [phone]    Primary Care Provider: Leonid Gold    Primary Insurance: [employer] [place] [employer]  Secondary Insurance:     PROGRESS NOTE:    CM was informed by Bascom Klinefelter at with [facility] that authorization was received for [facility]  Per Dr Dallas Vasquez, pt is not medically stable at this time  Pt is a tentative dc within 72hrs  MARISSA Perez at [facility] 1 on same

## 2023-02-08 NOTE — CONSULTS
[address] 1965, 56 y o  female MRN: [id number]  Unit/Bed#: -01 Encounter: [id number]  Primary Care Provider: Tim Crum   Date and time admitted to hospital: 2/6/2023  6:05 AM    Consults    * Primary osteoarthritis of left knee  Assessment & Plan  · POD 2 left arthroplasty of left knee   · Appreciate PT/OT recommendations   · Manage pain   · Rest of treatment per primary      Anemia  Assessment & Plan  · Hemoglobin 9 0 (2/8 0051)  · 9 9 (2/7 0500)  · 13 0 (1/17)  · BP soft in the 06'C systolic  No signs of active blood loss  Recent orthopedic procedure on 2/7  · Type and Screen obtained 2/6  · If Bps continue to be soft despite fluid resuscitation will consider blood transfusion   · Monitor H&H every 8 hours     Elevated lactic acid level  Assessment & Plan  · Lactic 2 4, 2 hour pending   · Not meeting SIRS but is borderline  · Tachycardia (90s), temp 639 3, BP 33H systolic   · Order BC   · Start abx  Unclear if infectious at this time  Obesity (BMI 30-39  9)  Assessment & Plan  · Body mass index is 31 68 kg/m²  · Encourage lifestyle changes       VTE Prophylaxis: VTE Score: 2 Low Risk (Score 0-2) - Encourage Ambulation  Lovenox ordered per primary     Recommendations for Discharge:  · Once medically cleared by primary     Counseling / Coordination of Care Time: 60 minutes Greater than 50% of total time spent on patient counseling and coordination of care  Collaboration of Care: Were Recommendations Directly Discussed with Primary Treatment Team? TT on call AP  History of Present Illness:  Stacie Otero is a 56 y o  female who is originally admitted to the Orthopedic service due to arthroplasty of her left knee  We are consulted for assistance in patient's anemia and vitals  Patient with soft Bps night of 2/7 into 2/8  Bp had also been soft during beginning of her admission which had improved with IV fluids   Patient seen at bedside reports continued pain in her left leg and mild lightheadedness when she gets up to ambulate  BP in the 74J systolic  No active blood loss noted  Review of Systems:  Review of Systems   Constitutional: Negative for fatigue and fever  HENT: Negative for sore throat  Respiratory: Negative for cough, chest tightness and shortness of breath  Cardiovascular: Negative for chest pain  Gastrointestinal: Negative for abdominal distention, abdominal pain, diarrhea, nausea and vomiting  Genitourinary: Negative for difficulty urinating  Musculoskeletal: Negative for arthralgias  Left knee pain   Neurological: Positive for light-headedness  Negative for weakness and headaches  Psychiatric/Behavioral: Negative for agitation and behavioral problems  All other systems reviewed and are negative  Past Medical and Surgical History:   Past Medical History:   Diagnosis Date   • Asthma    • Lupus ([address] )        Past Surgical History:   Procedure Laterality Date   • FOOT SURGERY Right     Talonavicular arthrodesis with calcaneal osteotomy   • CO ARTHRP KNE CONDYLE&PLATU MEDIAL&LAT COMPARTMENTS Left 2/6/2023    Procedure: ARTHROPLASTY KNEE TOTAL;  Surgeon: Russ Fofana MD;  Location: [facility];  Service: Orthopedics       Meds/Allergies:  all medications and allergies reviewed    Allergies: No Known Allergies    Social History:  Marital Status: Legally   Substance Use History:   Social History     Substance and Sexual Activity   Alcohol Use Not Currently     Social History     Tobacco Use   Smoking Status Every Day   • Packs/day: 0 25   • Years: 20 00   • Pack years: 5 00   • Types: Cigarettes   Smokeless Tobacco Never     Social History     Substance and Sexual Activity   Drug Use Never       Family History:  History reviewed  No pertinent family history      Physical Exam:   Vitals:   Blood Pressure: 92/57 (02/08/23 0059)  Pulse: 94 (02/08/23 0059)  Temperature: 99 5 °F (37 5 °C) (02/08/23 7939)  Temp Source: Oral (02/07/23 2336)  Respirations: 18 (02/07/23 2234)  Height: 5' (152 4 cm) (02/06/23 8800)  Weight - Scale: 73 6 kg (162 lb 3 2 oz) (02/06/23 0621)  SpO2: 95 % (02/07/23 2234)    Physical Exam  Vitals and nursing note reviewed  Constitutional:       Appearance: Normal appearance  She is obese  HENT:      Head: Normocephalic  Eyes:      Extraocular Movements: Extraocular movements intact  Pupils: Pupils are equal, round, and reactive to light  Cardiovascular:      Rate and Rhythm: Regular rhythm  Tachycardia present  Heart sounds: No murmur heard  Pulmonary:      Effort: Pulmonary effort is normal  No respiratory distress  Breath sounds: Normal breath sounds  No wheezing  Abdominal:      General: Bowel sounds are normal  There is no distension  Tenderness: There is no abdominal tenderness  There is no guarding  Musculoskeletal:         General: Tenderness (L knee ) present  Normal range of motion  Cervical back: Normal range of motion  Skin:     General: Skin is warm  Findings: Bruising (L thigh) present  Neurological:      General: No focal deficit present  Mental Status: She is alert and oriented to person, place, and time  Psychiatric:         Mood and Affect: Mood normal          Behavior: Behavior normal          Thought Content:  Thought content normal           Additional Data:   Lab Results:    Results from last 7 days   Lab Units 02/08/23  0044 02/08/23  0011   WBC Thousand/uL  --  8 12   HEMOGLOBIN g/dL 9 0* 8 7*   HEMATOCRIT % 27 0* 26 2*   PLATELETS Thousands/uL  --  207   NEUTROS PCT %  --  61   LYMPHS PCT %  --  28   MONOS PCT %  --  8   EOS PCT %  --  2     Results from last 7 days   Lab Units 02/08/23  0011   SODIUM mmol/L 138   POTASSIUM mmol/L 3 6   CHLORIDE mmol/L 105   CO2 mmol/L 28   BUN mg/dL 7   CREATININE mg/dL 0 57*   ANION GAP mmol/L 5   CALCIUM mg/dL 8 5   ALBUMIN g/dL 2 8*   TOTAL BILIRUBIN mg/dL 0 40   ALK PHOS U/L 63   ALT U/L 19   AST U/L 19   GLUCOSE RANDOM mg/dL 105     Results from last 7 days   Lab Units 02/08/23  0011   INR  0 99         Lab Results   Component Value Date/Time    HGBA1C 5 3 01/17/2023 10:46 AM         Results from last 7 days   Lab Units 02/08/23  0011   LACTIC ACID mmol/L 2 4*       Imaging: No pertinent imaging reviewed  No orders to display         ** Please Note: This note may have been constructed using a voice recognition system   **

## 2023-02-08 NOTE — ASSESSMENT & PLAN NOTE
· Hemoglobin 9 0 (2/8 0051)  · 9 9 (2/7 0500)  · 13 0 (1/17)  · BP soft in the 18'I systolic  No signs of active blood loss  Recent orthopedic procedure on 2/7     · Type and Screen obtained 2/6  · Transfuse when less than 7   · Hb stable at 8

## 2023-02-08 NOTE — PLAN OF CARE
Problem: PAIN - ADULT  Goal: Verbalizes/displays adequate comfort level or baseline comfort level  Description: Interventions:  - Encourage patient to monitor pain and request assistance  - Assess pain using appropriate pain scale  - Administer analgesics based on type and severity of pain and evaluate response  - Implement non-pharmacological measures as appropriate and evaluate response  - Consider cultural and social influences on pain and pain management  - Notify physician/advanced practitioner if interventions unsuccessful or patient reports new pain  Outcome: Progressing     Problem: INFECTION - ADULT  Goal: Absence or prevention of progression during hospitalization  Description: INTERVENTIONS:  - Assess and monitor for signs and symptoms of infection  - Monitor lab/diagnostic results  - Monitor all insertion sites, i e  indwelling lines, tubes, and drains  - Monitor endotracheal if appropriate and nasal secretions for changes in amount and color  - [place] appropriate cooling/warming therapies per order  - Administer medications as ordered  - Instruct and encourage patient and family to use good hand hygiene technique  - Identify and instruct in appropriate isolation precautions for identified infection/condition  Outcome: Progressing  Goal: Absence of fever/infection during neutropenic period  Description: INTERVENTIONS:  - Monitor WBC    Outcome: Progressing     Problem: SAFETY ADULT  Goal: Patient will remain free of falls  Description: INTERVENTIONS:  - Educate patient/family on patient safety including physical limitations  - Instruct patient to call for assistance with activity   - Consult OT/PT to assist with strengthening/mobility   - Keep Call bell within reach  - Keep bed low and locked with side rails adjusted as appropriate  - Keep care items and personal belongings within reach  - Initiate and maintain comfort rounds  - Make Fall Risk Sign visible to staff  - Offer Toileting every 2 Hours, in advance of need  - Initiate/Maintain alarm  - Obtain necessary fall risk management equipment  - Apply yellow socks and bracelet for high fall risk patients  - Consider moving patient to room near nurses station  Outcome: Progressing  Goal: Maintain or return to baseline ADL function  Description: INTERVENTIONS:  -  Assess patient's ability to carry out ADLs; assess patient's baseline for ADL function and identify physical deficits which impact ability to perform ADLs (bathing, care of mouth/teeth, toileting, grooming, dressing, etc )  - Assess/evaluate cause of self-care deficits   - Assess range of motion  - Assess patient's mobility; develop plan if impaired  - Assess patient's need for assistive devices and provide as appropriate  - Encourage maximum independence but intervene and supervise when necessary  - Involve family in performance of ADLs  - Assess for home care needs following discharge   - Consider OT consult to assist with ADL evaluation and planning for discharge  - Provide patient education as appropriate  Outcome: Progressing  Goal: Maintains/Returns to pre admission functional level  Description: INTERVENTIONS:  - Perform BMAT or MOVE assessment daily    - Set and communicate daily mobility goal to care team and patient/family/caregiver  - Collaborate with rehabilitation services on mobility goals if consulted  - Perform Range of Motion 4 times a day  - Reposition patient every 2 hours    - Dangle patient 4 times a day  - Stand patient 4 times a day  - Ambulate patient 4 times a day  - Out of bed to chair 4 times a day   - Out of bed for meals 3 times a day  - Out of bed for toileting  - Record patient progress and toleration of activity level   Outcome: Progressing     Problem: DISCHARGE PLANNING  Goal: Discharge to home or other facility with appropriate resources  Description: INTERVENTIONS:  - Identify barriers to discharge w/patient and caregiver  - Arrange for needed discharge resources and transportation as appropriate  - Identify discharge learning needs (meds, wound care, etc )  - Arrange for interpretive services to assist at discharge as needed  - Refer to Case Management Department for coordinating discharge planning if the patient needs post-hospital services based on physician/advanced practitioner order or complex needs related to functional status, cognitive ability, or social support system  Outcome: Progressing     Problem: Knowledge Deficit  Goal: Patient/family/caregiver demonstrates understanding of disease process, treatment plan, medications, and discharge instructions  Description: Complete learning assessment and assess knowledge base    Interventions:  - Provide teaching at level of understanding  - Provide teaching via preferred learning methods  Outcome: Progressing     Problem: MUSCULOSKELETAL - ADULT  Goal: Maintain or return mobility to safest level of function  Description: INTERVENTIONS:  - Assess patient's ability to carry out ADLs; assess patient's baseline for ADL function and identify physical deficits which impact ability to perform ADLs (bathing, care of mouth/teeth, toileting, grooming, dressing, etc )  - Assess/evaluate cause of self-care deficits   - Assess range of motion  - Assess patient's mobility  - Assess patient's need for assistive devices and provide as appropriate  - Encourage maximum independence but intervene and supervise when necessary  - Involve family in performance of ADLs  - Assess for home care needs following discharge   - Consider OT consult to assist with ADL evaluation and planning for discharge  - Provide patient education as appropriate  Outcome: Progressing  Goal: Maintain proper alignment of affected body part  Description: INTERVENTIONS:  - Support, maintain and protect limb and body alignment  - Provide patient/ family with appropriate education  Outcome: Progressing     Problem: MOBILITY - ADULT  Goal: Maintain or return to baseline ADL function  Description: INTERVENTIONS:  -  Assess patient's ability to carry out ADLs; assess patient's baseline for ADL function and identify physical deficits which impact ability to perform ADLs (bathing, care of mouth/teeth, toileting, grooming, dressing, etc )  - Assess/evaluate cause of self-care deficits   - Assess range of motion  - Assess patient's mobility; develop plan if impaired  - Assess patient's need for assistive devices and provide as appropriate  - Encourage maximum independence but intervene and supervise when necessary  - Involve family in performance of ADLs  - Assess for home care needs following discharge   - Consider OT consult to assist with ADL evaluation and planning for discharge  - Provide patient education as appropriate  Outcome: Progressing  Goal: Maintains/Returns to pre admission functional level  Description: INTERVENTIONS:  - Perform BMAT or MOVE assessment daily    - Set and communicate daily mobility goal to care team and patient/family/caregiver  - Collaborate with rehabilitation services on mobility goals if consulted  - Perform Range of Motion 4 times a day  - Reposition patient every 2 hours    - Dangle patient 4 times a day  - Stand patient 4 times a day  - Ambulate patient 4 times a day  - Out of bed to chair 4 times a day   - Out of bed for meals 3 times a day  - Out of bed for toileting  - Record patient progress and toleration of activity level   Outcome: Progressing     Problem: Prexisting or High Potential for Compromised Skin Integrity  Goal: Skin integrity is maintained or improved  Description: INTERVENTIONS:  - Identify patients at risk for skin breakdown  - Assess and monitor skin integrity  - Assess and monitor nutrition and hydration status  - Monitor labs   - Assess for incontinence   - Turn and reposition patient  - Assist with mobility/ambulation  - Relieve pressure over bony prominences  - Avoid friction and shearing  - Provide appropriate hygiene as needed including keeping skin clean and dry  - Evaluate need for skin moisturizer/barrier cream  - Collaborate with interdisciplinary team   - Patient/family teaching  - Consider wound care consult   Outcome: Progressing

## 2023-02-08 NOTE — ASSESSMENT & PLAN NOTE
· POD 2 left arthroplasty of left knee   · Appreciate PT/OT recommendations   · Manage pain   · Rest of treatment per primary

## 2023-02-08 NOTE — ASSESSMENT & PLAN NOTE
· Lactic cleared  · Not meeting SIRS but is borderline  · Tachycardia (90s), temp 856 0, BP 83Y systolic   · BC NGTD @37S  · dc'd cefepime  · procal negative  · CXR WNL venous dopplers - for DVT and UA - for UTI  · Most likely 2/2 atelectasis  · Encourage incentive spirometry use  ·

## 2023-02-08 NOTE — PHYSICAL THERAPY NOTE
PHYSICAL THERAPY NOTE       02/08/23 1130   PT Last Visit   PT Visit Date 02/08/23   Note Type   Note Type Treatment   Pain Assessment   Pain Assessment Tool 0-10   Pain Score 3   Pain Location/Orientation Orientation: Left; Location: Knee   Hospital Pain Intervention(s) Medication (See MAR); Ambulation/increased activity   Restrictions/Precautions   Weight Bearing Precautions Per Order Yes   LLE Weight Bearing Per Order WBAT   Other Precautions Pain; Fall Risk;Multiple lines; Bed Alarm; Chair Alarm   General   Chart Reviewed Yes   Additional Pertinent History BP supine: 108/58 sitting 105/51 standing 96/52 after amb 113/55   Response to Previous Treatment Patient with no complaints from previous session  Family/Caregiver Present No   Cognition   Overall Cognitive Status WFL   Arousal/Participation Alert   Attention Within functional limits   Orientation Level Oriented X4   Memory Within functional limits   Following Commands Follows all commands and directions without difficulty   Subjective   Subjective "Can I put heat on my knee?"   Bed Mobility   Supine to Sit 5  Supervision   Additional items HOB elevated; Bedrails; Increased time required;Verbal cues   Transfers   Sit to Stand 4  Minimal assistance   Additional items Assist x 1; Armrests; Increased time required;Verbal cues   Stand to Sit 4  Minimal assistance   Additional items Assist x 1; Armrests; Increased time required;Verbal cues   Ambulation/Elevation   Gait pattern Antalgic;Decreased L stance;Decreased heel strike;Decreased toe off;Knees flexed  (step to pattern)   Gait Assistance 4  Minimal assist   Additional items Assist x 1;Verbal cues   Assistive Device Rolling walker   Distance 10ft   Balance   Static Sitting Normal   Dynamic Sitting Good   Static Standing Fair +   Dynamic Standing Fair   Ambulatory Fair   Endurance Deficit   Endurance Deficit Yes   Endurance Deficit Description Limited by pain   Activity Tolerance   Activity Tolerance Patient limited by pain   Nurse Made Aware Jayesh CHINO   Assessment   Prognosis Guarded   Problem List Decreased strength;Decreased range of motion;Decreased endurance; Impaired balance;Decreased mobility;Obesity;Pain   Assessment Patient received supine in bed with purewick in place  RN reports that patient has low BP overnight  BP stable during session  Advised patient to attempt to use commode instead of purewick  Patient thoroughly educated on importance of OOB activity  Patient required min A for transfers and amb  She did progress with increased ambulation distance today  She continues to be limited by pain  She is also reluctant to bend the knee due to pain-education provided  Patient is deconditioned and is not at her baseline  She is at risk for falls  Recommending rehab  The patient's AM-[facility] Basic Mobility Inpatient Short Form Raw Score is 14  A Raw score of less than or equal to 17 suggests the patient may benefit from discharge to post-acute rehabilitation services  Please also refer to the recommendation of the Physical Therapist for safe discharge planning  Barriers to Discharge Inaccessible home environment;Decreased caregiver support   Goals   Patient Goals To go home   STG Expiration Date 02/15/23   Plan   Treatment/Interventions Functional transfer training;LE strengthening/ROM; Elevations; Therapeutic exercise; Endurance training;Patient/family training;Equipment eval/education; Bed mobility;Gait training;Spoke to nursing   Progress Slow progress, decreased activity tolerance   PT Frequency 5-7x/wk   Recommendation   PT Discharge Recommendation Post acute rehabilitation services   AM-PAC Basic Mobility Inpatient   Turning in Flat Bed Without Bedrails 3   Lying on Back to Sitting on Edge of Flat Bed Without Bedrails 3   Moving Bed to Chair 3   Standing Up From Chair Using Arms 3   Walk in Room 1   Climb 3-5 Stairs With Railing 1   Basic Mobility Inpatient Raw Score 14   Basic Mobility Standardized Score 35 55   Highest Level Of Mobility   -[facility] Goal 4: Move to chair/commode   -HL Achieved 6: Walk 10 steps or more   Education   Education Provided Mobility training;Assistive device  (Positioning of knee in bed, importance of working on knee flexion when sitting OOB  Use of ice )   Patient Reinforcement needed   End of Consult   Patient Position at End of Consult Bedside chair;Bed/Chair alarm activated; All needs within reach  (LE's elevated )   Wilma Banda            Patient Name: Juan Michael Date: 2/8/2023

## 2023-02-09 VITALS
SYSTOLIC BLOOD PRESSURE: 118 MMHG | RESPIRATION RATE: 16 BRPM | TEMPERATURE: 98.4 F | HEIGHT: 60 IN | DIASTOLIC BLOOD PRESSURE: 59 MMHG | HEART RATE: 72 BPM | WEIGHT: 162.2 LBS | OXYGEN SATURATION: 91 % | BODY MASS INDEX: 31.84 KG/M2

## 2023-02-09 PROBLEM — R79.89 ELEVATED LACTIC ACID LEVEL: Status: RESOLVED | Noted: 2023-02-08 | Resolved: 2023-02-09

## 2023-02-09 LAB
ALBUMIN SERPL BCP-MCNC: 3 G/DL (ref 3.5–5)
ALP SERPL-CCNC: 57 U/L (ref 46–116)
ALT SERPL W P-5'-P-CCNC: 20 U/L (ref 12–78)
ANION GAP SERPL CALCULATED.3IONS-SCNC: 5 MMOL/L (ref 4–13)
AST SERPL W P-5'-P-CCNC: 22 U/L (ref 5–45)
BASOPHILS # BLD AUTO: 0.06 THOUSANDS/ÂΜL (ref 0–0.1)
BASOPHILS NFR BLD AUTO: 1 % (ref 0–1)
BILIRUB DIRECT SERPL-MCNC: 0.16 MG/DL (ref 0–0.2)
BILIRUB SERPL-MCNC: 0.8 MG/DL (ref 0.2–1)
BUN SERPL-MCNC: 7 MG/DL (ref 5–25)
CALCIUM SERPL-MCNC: 7.8 MG/DL (ref 8.3–10.1)
CHLORIDE SERPL-SCNC: 106 MMOL/L (ref 96–108)
CO2 SERPL-SCNC: 28 MMOL/L (ref 21–32)
CREAT SERPL-MCNC: 0.56 MG/DL (ref 0.6–1.3)
EOSINOPHIL # BLD AUTO: 0.25 THOUSAND/ÂΜL (ref 0–0.61)
EOSINOPHIL NFR BLD AUTO: 3 % (ref 0–6)
ERYTHROCYTE [DISTWIDTH] IN BLOOD BY AUTOMATED COUNT: 12.8 % (ref 11.6–15.1)
FLUAV RNA RESP QL NAA+PROBE: NEGATIVE
FLUBV RNA RESP QL NAA+PROBE: NEGATIVE
GFR SERPL CREATININE-BSD FRML MDRD: 105 ML/MIN/1.73SQ M
GLUCOSE SERPL-MCNC: 87 MG/DL (ref 65–140)
HCT VFR BLD AUTO: 26.9 % (ref 34.8–46.1)
HGB BLD-MCNC: 8.9 G/DL (ref 11.5–15.4)
IMM GRANULOCYTES # BLD AUTO: 0.02 THOUSAND/UL (ref 0–0.2)
IMM GRANULOCYTES NFR BLD AUTO: 0 % (ref 0–2)
LYMPHOCYTES # BLD AUTO: 2.74 THOUSANDS/ÂΜL (ref 0.6–4.47)
LYMPHOCYTES NFR BLD AUTO: 36 % (ref 14–44)
MAGNESIUM SERPL-MCNC: 1.8 MG/DL (ref 1.6–2.6)
MCH RBC QN AUTO: 30.6 PG (ref 26.8–34.3)
MCHC RBC AUTO-ENTMCNC: 33.1 G/DL (ref 31.4–37.4)
MCV RBC AUTO: 92 FL (ref 82–98)
MONOCYTES # BLD AUTO: 0.63 THOUSAND/ÂΜL (ref 0.17–1.22)
MONOCYTES NFR BLD AUTO: 8 % (ref 4–12)
NEUTROPHILS # BLD AUTO: 3.86 THOUSANDS/ÂΜL (ref 1.85–7.62)
NEUTS SEG NFR BLD AUTO: 52 % (ref 43–75)
NRBC BLD AUTO-RTO: 0 /100 WBCS
PLATELET # BLD AUTO: 220 THOUSANDS/UL (ref 149–390)
PMV BLD AUTO: 9.2 FL (ref 8.9–12.7)
POTASSIUM SERPL-SCNC: 4 MMOL/L (ref 3.5–5.3)
PROCALCITONIN SERPL-MCNC: 0.05 NG/ML
PROT SERPL-MCNC: 6.2 G/DL (ref 6.4–8.4)
RBC # BLD AUTO: 2.91 MILLION/UL (ref 3.81–5.12)
RSV RNA RESP QL NAA+PROBE: NEGATIVE
SARS-COV-2 RNA RESP QL NAA+PROBE: NEGATIVE
SODIUM SERPL-SCNC: 139 MMOL/L (ref 135–147)
WBC # BLD AUTO: 7.56 THOUSAND/UL (ref 4.31–10.16)

## 2023-02-09 RX ADMIN — MULTIPLE VITAMINS W/ MINERALS TAB 1 TABLET: TAB ORAL at 08:53

## 2023-02-09 RX ADMIN — TRAMADOL HYDROCHLORIDE 50 MG: 50 TABLET ORAL at 12:45

## 2023-02-09 RX ADMIN — GABAPENTIN 100 MG: 100 CAPSULE ORAL at 08:53

## 2023-02-09 RX ADMIN — HYDROXYCHLOROQUINE SULFATE 200 MG: 200 TABLET, FILM COATED ORAL at 09:00

## 2023-02-09 RX ADMIN — CEFEPIME HYDROCHLORIDE 2000 MG: 2 INJECTION, SOLUTION INTRAVENOUS at 08:51

## 2023-02-09 RX ADMIN — OXYCODONE HYDROCHLORIDE AND ACETAMINOPHEN 500 MG: 500 TABLET ORAL at 08:53

## 2023-02-09 RX ADMIN — GABAPENTIN 100 MG: 100 CAPSULE ORAL at 02:08

## 2023-02-09 RX ADMIN — SODIUM CHLORIDE, SODIUM LACTATE, POTASSIUM CHLORIDE, AND CALCIUM CHLORIDE 100 ML/HR: .6; .31; .03; .02 INJECTION, SOLUTION INTRAVENOUS at 04:50

## 2023-02-09 RX ADMIN — CEFEPIME HYDROCHLORIDE 2000 MG: 2 INJECTION, SOLUTION INTRAVENOUS at 01:08

## 2023-02-09 RX ADMIN — ACETAMINOPHEN 975 MG: 325 TABLET, FILM COATED ORAL at 01:08

## 2023-02-09 RX ADMIN — STANDARDIZED SENNA CONCENTRATE 8.6 MG: 8.6 TABLET ORAL at 08:53

## 2023-02-09 RX ADMIN — PANTOPRAZOLE SODIUM 40 MG: 40 TABLET, DELAYED RELEASE ORAL at 08:53

## 2023-02-09 RX ADMIN — OXYCODONE HYDROCHLORIDE 10 MG: 5 TABLET ORAL at 08:52

## 2023-02-09 RX ADMIN — ENOXAPARIN SODIUM 30 MG: 100 INJECTION SUBCUTANEOUS at 08:55

## 2023-02-09 RX ADMIN — ACETAMINOPHEN 975 MG: 325 TABLET, FILM COATED ORAL at 08:53

## 2023-02-09 RX ADMIN — CYCLOBENZAPRINE HYDROCHLORIDE 5 MG: 5 TABLET, FILM COATED ORAL at 08:53

## 2023-02-09 RX ADMIN — FOLIC ACID 1 MG: 1 TABLET ORAL at 08:53

## 2023-02-09 RX ADMIN — DOCUSATE SODIUM 100 MG: 100 CAPSULE, LIQUID FILLED ORAL at 08:53

## 2023-02-09 RX ADMIN — FERROUS SULFATE TAB 325 MG (65 MG ELEMENTAL FE) 325 MG: 325 (65 FE) TAB at 08:58

## 2023-02-09 NOTE — PROGRESS NOTES
[place]  Progress Note - James Iqbal 1965, 56 y o  female MRN: [id number]  Unit/Bed#: -01 Encounter: [id number]  Primary Care Provider: Rocío Ventura   Date and time admitted to hospital: 2/6/2023  6:05 AM    * Primary osteoarthritis of left knee  Assessment & Plan  · POD 2 left arthroplasty of left knee   · PT/OT eval  · Manage pain   · Rest of treatment per primary      Obesity (BMI 30-39  9)  Assessment & Plan  Body mass index is 31 68 kg/m²  · Encourage lifestyle changes     Anemia  Assessment & Plan  · Hemoglobin 9 0 (2/8 0051)  · 9 9 (2/7 0500)  · 13 0 (1/17)  · BP soft in the 51'J systolic  No signs of active blood loss  Recent orthopedic procedure on 2/7  · Type and Screen obtained 2/6  · Transfuse when less than 7   · Hb stable at 8    Elevated lactic acid level-resolved as of 2/9/2023  Assessment & Plan  · Lactic cleared  · Not meeting SIRS but is borderline  · Tachycardia (90s), temp 233 6, BP 67R systolic   · BC NGTD @05H  · dc'd cefepime  · procal negative  · CXR WNL venous dopplers - for DVT and UA - for UTI  · Most likely 2/2 atelectasis  · Encourage incentive spirometry use  ·       VTE Pharmacologic Prophylaxis: VTE Score: 2 Moderate Risk (Score 3-4) - Pharmacological DVT Prophylaxis Ordered: enoxaparin (Lovenox)  Patient Centered Rounds: I performed bedside rounds with nursing staff today  Discussions with Specialists or Other Care Team Provider: ortho    Education and Discussions with Family / Patient: Patient declined call to   Time Spent for Care: 30 minutes  More than 50% of total time spent on counseling and coordination of care as described above  Current Length of Stay: 1 day(s)  Current Patient Status: Inpatient   Certification Statement: The patient will continue to require additional inpatient hospital stay due to post op fever  Discharge Plan: [facility] is following this patient on consult   They are medically stable for discharge when deemed appropriate by primary service  Code Status: Level 1 - Full Code    Subjective:   [place] was seen and examined at bedside  No acute events overnight  Discussed plan of care  All questions and concerns were answered and addressed  Objective:     Vitals:   Temp (24hrs), Av 8 °F (37 1 °C), Min:98 3 °F (36 8 °C), Max:99 4 °F (37 4 °C)    Temp:  [98 3 °F (36 8 °C)-99 4 °F (37 4 °C)] 98 4 °F (36 9 °C)  HR:  [68-82] 72  Resp:  [16-20] 16  BP: (102-118)/(55-59) 118/59  SpO2:  [91 %-98 %] 91 %  Body mass index is 31 68 kg/m²  Input and Output Summary (last 24 hours): Intake/Output Summary (Last 24 hours) at 2023 1302  Last data filed at 2023 0801  Gross per 24 hour   Intake 1687 92 ml   Output 2700 ml   Net -1012 08 ml       Physical Exam:   Physical Exam  Vitals and nursing note reviewed  Constitutional:       Appearance: She is obese  She is not ill-appearing  HENT:      Head: Normocephalic and atraumatic  Cardiovascular:      Rate and Rhythm: Normal rate and regular rhythm  Pulses: Normal pulses  Heart sounds: Normal heart sounds  Pulmonary:      Effort: Pulmonary effort is normal       Breath sounds: Normal breath sounds  Abdominal:      General: Abdomen is flat  Bowel sounds are normal       Palpations: Abdomen is soft  Musculoskeletal:      Right lower leg: No edema  Left lower leg: No edema  Skin:     General: Skin is warm  Neurological:      General: No focal deficit present  Mental Status: She is alert and oriented to person, place, and time            Additional Data:     Labs:  Results from last 7 days   Lab Units 23  0459   WBC Thousand/uL 7 56   HEMOGLOBIN g/dL 8 9*   HEMATOCRIT % 26 9*   PLATELETS Thousands/uL 220   NEUTROS PCT % 52   LYMPHS PCT % 36   MONOS PCT % 8   EOS PCT % 3     Results from last 7 days   Lab Units 23  0459   SODIUM mmol/L 139   POTASSIUM mmol/L 4 0   CHLORIDE mmol/L 106   CO2 mmol/L 28   BUN mg/dL 7   CREATININE mg/dL 0 56*   ANION GAP mmol/L 5   CALCIUM mg/dL 7 8*   ALBUMIN g/dL 3 0*   TOTAL BILIRUBIN mg/dL 0 80   ALK PHOS U/L 57   ALT U/L 20   AST U/L 22   GLUCOSE RANDOM mg/dL 87     Results from last 7 days   Lab Units 02/08/23  0011   INR  0 99             Results from last 7 days   Lab Units 02/09/23  0459 02/08/23  0443 02/08/23  0011   LACTIC ACID mmol/L  --  0 5 2 4*   PROCALCITONIN ng/ml 0 05  --  <0 05       Lines/Drains:  Invasive Devices     Peripheral Intravenous Line  Duration           Peripheral IV 02/08/23 Distal;Dorsal (posterior); Right Wrist 1 day          Drain  Duration           External Urinary Catheter 1 day                      Imaging: Reviewed radiology reports from this admission including: chest xray    Recent Cultures (last 7 days):   Results from last 7 days   Lab Units 02/08/23  0148 02/08/23  0121   BLOOD CULTURE  No Growth at 24 hrs  No Growth at 24 hrs         Last 24 Hours Medication List:   Current Facility-Administered Medications   Medication Dose Route Frequency Provider Last Rate   • acetaminophen  975 mg Oral Q8H Rupal Reyna PA-C     • albuterol  2 puff Inhalation Q4H PRN Rupal Reyna PA-C     • albuterol  2 5 mg Nebulization Q6H PRN Rupal Reyna PA-C     • aluminum-magnesium hydroxide-simethicone  15 mL Oral Q6H PRN Rupal Reyna PA-C     • ascorbic acid  500 mg Oral BID Rupal Reyna PA-C     • bisacodyl  10 mg Rectal Daily PRN Rupal Reyna PA-C     • calcium carbonate  1,000 mg Oral Daily PRN Rupal Reyna PA-C     • cyclobenzaprine  5 mg Oral TID Rupal Reyna PA-C     • docusate sodium  100 mg Oral BID Rupal Reyna PA-C     • enoxaparin  30 mg Subcutaneous Q12H [address] Rupal Reyna PA-C     • ferrous sulfate  325 mg Oral BID With Meals Rupal Reyna PA-C     • folic acid  1 mg Oral Daily Rupal Reyna PA-C     • gabapentin  100 mg Oral Q8H Rupal Paganini, PA-C     • hydroxychloroquine  200 mg Oral Daily With Breakfast Rupal Reyna PA-C     • menthol-methyl salicylate   Apply externally BID Kiley Diop PA-C     • multivitamin-minerals  1 tablet Oral Daily Kiley Diop PA-C     • ondansetron  4 mg Intravenous Q4H PRN Kiley Diop PA-C     • oxyCODONE  10 mg Oral Q4H PRN Kiley Diop PA-C     • oxyCODONE  5 mg Oral Q4H PRN Kiley Diop PA-C     • pantoprazole  40 mg Oral Daily Kiley Diop PA-C     • senna  1 tablet Oral Daily Kiley Diop PA-C     • simethicone  80 mg Oral 4x Daily PRN Kiley Diop PA-C     • traMADol  50 mg Oral Q6H [address] Kiley Diop PA-C          Today, Patient Was Seen By: Marvel Stahl MD    **Please Note: This note may have been constructed using a voice recognition system  **

## 2023-02-09 NOTE — PLAN OF CARE
Problem: PAIN - ADULT  Goal: Verbalizes/displays adequate comfort level or baseline comfort level  Description: Interventions:  - Encourage patient to monitor pain and request assistance  - Assess pain using appropriate pain scale  - Administer analgesics based on type and severity of pain and evaluate response  - Implement non-pharmacological measures as appropriate and evaluate response  - Consider cultural and social influences on pain and pain management  - Notify physician/advanced practitioner if interventions unsuccessful or patient reports new pain  Outcome: Progressing     Problem: INFECTION - ADULT  Goal: Absence or prevention of progression during hospitalization  Description: INTERVENTIONS:  - Assess and monitor for signs and symptoms of infection  - Monitor lab/diagnostic results  - Monitor all insertion sites, i e  indwelling lines, tubes, and drains  - Monitor endotracheal if appropriate and nasal secretions for changes in amount and color  - [place] appropriate cooling/warming therapies per order  - Administer medications as ordered  - Instruct and encourage patient and family to use good hand hygiene technique  - Identify and instruct in appropriate isolation precautions for identified infection/condition  Outcome: Progressing  Goal: Absence of fever/infection during neutropenic period  Description: INTERVENTIONS:  - Monitor WBC    Outcome: Progressing     Problem: SAFETY ADULT  Goal: Patient will remain free of falls  Description: INTERVENTIONS:  - Educate patient/family on patient safety including physical limitations  - Instruct patient to call for assistance with activity   - Consult OT/PT to assist with strengthening/mobility   - Keep Call bell within reach  - Keep bed low and locked with side rails adjusted as appropriate  - Keep care items and personal belongings within reach  - Initiate and maintain comfort rounds  - Make Fall Risk Sign visible to staff  - Offer Toileting every 2 Hours, in advance of need  - Initiate/Maintain alarm  - Obtain necessary fall risk management equipment  - Apply yellow socks and bracelet for high fall risk patients  - Consider moving patient to room near nurses station  Outcome: Progressing  Goal: Maintain or return to baseline ADL function  Description: INTERVENTIONS:  -  Assess patient's ability to carry out ADLs; assess patient's baseline for ADL function and identify physical deficits which impact ability to perform ADLs (bathing, care of mouth/teeth, toileting, grooming, dressing, etc )  - Assess/evaluate cause of self-care deficits   - Assess range of motion  - Assess patient's mobility; develop plan if impaired  - Assess patient's need for assistive devices and provide as appropriate  - Encourage maximum independence but intervene and supervise when necessary  - Involve family in performance of ADLs  - Assess for home care needs following discharge   - Consider OT consult to assist with ADL evaluation and planning for discharge  - Provide patient education as appropriate  Outcome: Progressing  Goal: Maintains/Returns to pre admission functional level  Description: INTERVENTIONS:  - Perform BMAT or MOVE assessment daily    - Set and communicate daily mobility goal to care team and patient/family/caregiver  - Collaborate with rehabilitation services on mobility goals if consulted  - Perform Range of Motion 4 times a day  - Reposition patient every 2 hours    - Dangle patient 4 times a day  - Stand patient 4 times a day  - Ambulate patient 4 times a day  - Out of bed to chair 4 times a day   - Out of bed for meals 3 times a day  - Out of bed for toileting  - Record patient progress and toleration of activity level   Outcome: Progressing     Problem: DISCHARGE PLANNING  Goal: Discharge to home or other facility with appropriate resources  Description: INTERVENTIONS:  - Identify barriers to discharge w/patient and caregiver  - Arrange for needed discharge resources and transportation as appropriate  - Identify discharge learning needs (meds, wound care, etc )  - Arrange for interpretive services to assist at discharge as needed  - Refer to Case Management Department for coordinating discharge planning if the patient needs post-hospital services based on physician/advanced practitioner order or complex needs related to functional status, cognitive ability, or social support system  Outcome: Progressing     Problem: Knowledge Deficit  Goal: Patient/family/caregiver demonstrates understanding of disease process, treatment plan, medications, and discharge instructions  Description: Complete learning assessment and assess knowledge base    Interventions:  - Provide teaching at level of understanding  - Provide teaching via preferred learning methods  Outcome: Progressing     Problem: MUSCULOSKELETAL - ADULT  Goal: Maintain or return mobility to safest level of function  Description: INTERVENTIONS:  - Assess patient's ability to carry out ADLs; assess patient's baseline for ADL function and identify physical deficits which impact ability to perform ADLs (bathing, care of mouth/teeth, toileting, grooming, dressing, etc )  - Assess/evaluate cause of self-care deficits   - Assess range of motion  - Assess patient's mobility  - Assess patient's need for assistive devices and provide as appropriate  - Encourage maximum independence but intervene and supervise when necessary  - Involve family in performance of ADLs  - Assess for home care needs following discharge   - Consider OT consult to assist with ADL evaluation and planning for discharge  - Provide patient education as appropriate  Outcome: Progressing  Goal: Maintain proper alignment of affected body part  Description: INTERVENTIONS:  - Support, maintain and protect limb and body alignment  - Provide patient/ family with appropriate education  Outcome: Progressing     Problem: MOBILITY - ADULT  Goal: Maintain or return to baseline ADL function  Description: INTERVENTIONS:  -  Assess patient's ability to carry out ADLs; assess patient's baseline for ADL function and identify physical deficits which impact ability to perform ADLs (bathing, care of mouth/teeth, toileting, grooming, dressing, etc )  - Assess/evaluate cause of self-care deficits   - Assess range of motion  - Assess patient's mobility; develop plan if impaired  - Assess patient's need for assistive devices and provide as appropriate  - Encourage maximum independence but intervene and supervise when necessary  - Involve family in performance of ADLs  - Assess for home care needs following discharge   - Consider OT consult to assist with ADL evaluation and planning for discharge  - Provide patient education as appropriate  Outcome: Progressing  Goal: Maintains/Returns to pre admission functional level  Description: INTERVENTIONS:  - Perform BMAT or MOVE assessment daily    - Set and communicate daily mobility goal to care team and patient/family/caregiver  - Collaborate with rehabilitation services on mobility goals if consulted  - Perform Range of Motion 4 times a day  - Reposition patient every 2 hours    - Dangle patient 4 times a day  - Stand patient 4 times a day  - Ambulate patient 4 times a day  - Out of bed to chair 4 times a day   - Out of bed for meals 3 times a day  - Out of bed for toileting  - Record patient progress and toleration of activity level   Outcome: Progressing     Problem: Prexisting or High Potential for Compromised Skin Integrity  Goal: Skin integrity is maintained or improved  Description: INTERVENTIONS:  - Identify patients at risk for skin breakdown  - Assess and monitor skin integrity  - Assess and monitor nutrition and hydration status  - Monitor labs   - Assess for incontinence   - Turn and reposition patient  - Assist with mobility/ambulation  - Relieve pressure over bony prominences  - Avoid friction and shearing  - Provide appropriate hygiene as needed including keeping skin clean and dry  - Evaluate need for skin moisturizer/barrier cream  - Collaborate with interdisciplinary team   - Patient/family teaching  - Consider wound care consult   Outcome: Progressing

## 2023-02-09 NOTE — PROGRESS NOTES
Lauren Saucedo  56 y o   female  MR#: [id number]  2/9/2023    Post-op days: 3  Extremity: left knee    Subjective: Patient seen and examined bedside  She states that her left knee is painful but it is tolerable with the pain medication that is being provided to her  She denies any chest pain, shortness of breath, fevers or chills  Vitals:   Vitals:    02/09/23 0533   BP: 110/55   Pulse: 68   Resp: 16   Temp: 98 6 °F (37 °C)   SpO2: 93%       Exam:   A&O x 3 NAD  Left knee:  Mepilex dressing intact  Small area of sanguinous drainage at mid dressing  Thigh and calf compartments soft, compressible  Actively moving ankle and toes  DP 2+   Sensation intact to light touch  Capillary refill brisk  Tissue warm and well-perfused      Labs:   WBC   Recent Labs     02/08/23  0011 02/08/23  0443 02/09/23  0459   WBC 8 12 7 37 7 56     H/H   Recent Labs     02/07/23  0517 02/08/23  0011 02/08/23  0044 02/08/23  0443 02/08/23  1259 02/08/23 2121 02/09/23  0459   HGB 9 9* 8 7* 9 0* 8 3* 9 4* 8 4* 8 9*   /  Recent Labs     02/07/23  0517 02/08/23  0011 02/08/23  0044 02/08/23  0443 02/08/23  1259 02/08/23 2121 02/09/23  0459   HCT 30 3* 26 2* 27 0* 25 2* 28 5* 25 0* 26 9*     Sed Rate No results for input(s): SEDRATE in the last 72 hours  CRP No results for input(s): CRP in the last 72 hours  Assessment:   Postop day 3 s/p left TKA    Plan:   Weightbearing as tolerated to left lower extremity with assistance  PT/OT  Pain control as needed  DVT prophylaxis: Lovenox and mechanical   Will discharge home on aspirin 81 mg twice daily x6 weeks  ABLA: Hbg 8 9 this AM  Continue to monitor vitals and H/H  Continue knee-high compression stocking during the day, continue with SCDs  Ice and elevation to left knee  Absolutely no pillows or rolled up towels underneath left knee  Keep pillow underneath ankle for full extension of knee  DC planning: Plan on dc to rehab once she is cleared by PT/OT    Follow-up as outpatient as scheduled with Dr Rita Oliva in 10 to 14 days        Tiara DAVIN

## 2023-02-09 NOTE — UTILIZATION REVIEW
NOTIFICATION OF ADMISSION DISCHARGE   This is a Notification of Discharge from [address]  Please be advised that this patient has been discharge from our facility  Below you will find the admission and discharge date and time including the patient’s disposition  UTILIZATION REVIEW CONTACT:  Shelia Price  Utilization   Network Utilization Review Department  Phone: [phone] carefully listen to the prompts  All voicemails are confidential   Email: [email or web address]     ADMISSION INFORMATION  PRESENTATION DATE: 2/6/2023  6:05 AM  OBERVATION ADMISSION DATE:   INPATIENT ADMISSION DATE: 2/8/23  4:22 PM   DISCHARGE DATE: 2/9/2023  2:41 PM   DISPOSITION:Non [facility] SNF/TCU/SNU    IMPORTANT INFORMATION:  Send all requests for admission clinical reviews, approved or denied determinations and any other requests to dedicated fax number below belonging to the campus where the patient is receiving treatment   List of dedicated fax numbers:  [address] DENIALS (Administrative/Medical Necessity) [phone]   [address] (Maternity/NICU/Pediatrics) [phone]   [facility] [phone]   [facility] 87 [phone]   [address] [phone]   220 [facility] [phone] 90 [facility] [phone]   [address] [place], [place] [address] [phone]   [facility]  [phone]   [facility] [facility] [phone]   412 [facility] [address] [facility] [phone]

## 2023-02-09 NOTE — DISCHARGE SUMMARY
ORTHOPEDICS DISCHARGE SUMMARY  Do Hernández 56 y o  female MRN: [id number]  Unit/Bed#:     Attending Physician: Dr Ari Parkinson MD    Admitting diagnosis: Primary osteoarthritis of left knee [M17 12]    Discharge diagnosis: Primary osteoarthritis of left knee [M17 12]    Date of admission: 2/6/2023    Date of discharge: 02/09/23         Procedure: left total knee arthroplasty    HPI:  This is a 56y o  year old female that presented to the office with signs and symptoms of left knee osteoarthritis  They tried and failed conservative treatment measures and wished to proceed with surgical intervention  The risks, benefits, and complications of the procedure were discussed with the patient and informed consent was obtained  Hospital Course: The patient was admitted to the hospital on 2/6/2023 and underwent an uncomplicated left total knee arthroplasty  They were transferred to the floor after a brief stay in the post-anesthesia care unit  Their pain was well managed with IV and oral pain medications  They began therapy on post operative day #1  Lovenox 30 mg BID was also started for DVT prophylaxis 12 hours post operatively  Patient developed postoperative fever and hypotension  Internal medicine was consulted and work-up for infection was negative  Fever resolved  Hypotension improved with IV fluids  On discharge date pt was cleared by PT and the medicine team and determined to be safe for discharge  Daily discussion was had with the patient, nursing staff, orthopaedic team, and family members if present  All questions were answered to the patients satisifaction        0   Lab Value Date/Time    HGB 8 9 (L) 02/09/2023 0459    HGB 8 4 (L) 02/08/2023 2121    HGB 9 4 (L) 02/08/2023 1259    HGB 8 3 (L) 02/08/2023 0443    HGB 9 0 (L) 02/08/2023 0044    HGB 8 7 (L) 02/08/2023 0011    HGB 9 9 (L) 02/07/2023 0517    HGB 13 0 01/17/2023 1046       Greater than 2 gram drop which qualifies for diagnosis of acute blood loss anemia  Vital signs remained stable and pt was resuscitated with IVF as needed       Discharge Instructions: The patient was discharged weight bearing as tolerated to the left lower extremity  She will take aspirin 81 mg twice daily x6 weeks for DVT prophylaxis  Continue PT/OT  Take pain medications as instructed  Discharge Medications: For the complete list of discharge medications, please refer to the patient's medication reconciliation

## 2023-02-09 NOTE — UTILIZATION REVIEW
NOTIFICATION OF INPATIENT ADMISSION   AUTHORIZATION REQUEST   SERVICING FACILITY:   [address] [address]  Tax ID: [id number]  NPI: [id number] ATTENDING PROVIDER:  Attending Name and NPI#: Carey Martin Md [[id number]]  Address: [address] [place] [address]  Phone: [phone]   ADMISSION INFORMATION:  Place of Service: [place] [address]  Place of Service Code: 21  Inpatient Admission Date/Time: 2/8/23  4:22 PM  Discharge Date/Time: No discharge date for patient encounter  Admitting Diagnosis Code/Description:  Primary osteoarthritis of left knee [M17 12]     UTILIZATION REVIEW CONTACT:  Akosua Zarate Utilization   Network Utilization Review Department  Phone: [phone]  Fax [phone]  Email: [email or web address]  Contact for approvals/pending authorizations, clinical reviews, and discharge  PHYSICIAN ADVISORY SERVICES:  Medical Necessity Denial & [facility]px-id-[facility] Review  Phone: [phone]  Fax: [phone]  Email: [email or web address]

## 2023-02-10 ENCOUNTER — TELEPHONE (OUTPATIENT)
Dept: OBGYN CLINIC | Facility: HOSPITAL | Age: 58
End: 2023-02-10

## 2023-02-10 DIAGNOSIS — M17.12 PRIMARY OSTEOARTHRITIS OF LEFT KNEE: ICD-10-CM

## 2023-02-10 NOTE — TELEPHONE ENCOUNTER
Caller: Patient     Doctor: Cheyenne Marie    Reason for call: Patient is requesting all of her medications be sent to [facility], she said she is going home with a family member because she doesn't feel safe in the facility      Call back#: [phone]

## 2023-02-13 LAB
BACTERIA BLD CULT: NORMAL
BACTERIA BLD CULT: NORMAL

## 2023-02-13 RX ORDER — OXYCODONE HYDROCHLORIDE 5 MG/1
5 TABLET ORAL EVERY 4 HOURS PRN
Qty: 20 TABLET | Refills: 0 | Status: SHIPPED | OUTPATIENT
Start: 2023-02-13 | End: 2023-02-23

## 2023-02-13 RX ORDER — ASPIRIN 81 MG/1
81 TABLET ORAL 2 TIMES DAILY
Qty: 60 TABLET | Refills: 0 | Status: SHIPPED | OUTPATIENT
Start: 2023-02-13

## 2023-02-13 RX ORDER — ACETAMINOPHEN 325 MG/1
975 TABLET ORAL EVERY 8 HOURS
Qty: 60 TABLET | Refills: 0 | Status: SHIPPED | OUTPATIENT
Start: 2023-02-13

## 2023-02-14 ENCOUNTER — APPOINTMENT (OUTPATIENT)
Dept: PHYSICAL THERAPY | Facility: CLINIC | Age: 58
End: 2023-02-14

## 2023-02-15 ENCOUNTER — TELEPHONE (OUTPATIENT)
Dept: OBGYN CLINIC | Facility: HOSPITAL | Age: 58
End: 2023-02-15

## 2023-02-15 NOTE — TELEPHONE ENCOUNTER
Called and spoke w/pt   Pt notified of msg and states that she understands and will follow advise and "do that "

## 2023-02-15 NOTE — TELEPHONE ENCOUNTER
Caller: Patient     Doctor: Kathryn Giordano    Reason for call: Patient states the Oxycodone 5mg is not helping much with the pain in the left knee  Is there anything else that she can take or a higher dose? Call back#: [phone]

## 2023-02-15 NOTE — TELEPHONE ENCOUNTER
We do not prescribe anything stronger  She should be taking the tylenol around the clock and she can take the naproxen as well for more pain relief  She needs to be icing and elevating multiple times a day which will help her pain as well

## 2023-02-16 ENCOUNTER — APPOINTMENT (OUTPATIENT)
Dept: PHYSICAL THERAPY | Facility: CLINIC | Age: 58
End: 2023-02-16

## 2023-02-16 ENCOUNTER — OFFICE VISIT (OUTPATIENT)
Dept: PHYSICAL THERAPY | Facility: CLINIC | Age: 58
End: 2023-02-16

## 2023-02-16 DIAGNOSIS — Z96.652 STATUS POST TOTAL LEFT KNEE REPLACEMENT: ICD-10-CM

## 2023-02-16 DIAGNOSIS — M17.12 PRIMARY OSTEOARTHRITIS OF LEFT KNEE: Primary | ICD-10-CM

## 2023-02-16 NOTE — PROGRESS NOTES
PT Evaluation     Today's date: 2023  Patient name: Abeba Patton  : 1965  MRN: [id number]  Referring provider: Arnoldo Maya PA-C  Dx:   Encounter Diagnosis     ICD-10-CM    1  Primary osteoarthritis of left knee  M17 12       2  Status post total left knee replacement  [id number]                      Assessment  Assessment details: Abeba Patton is a 56 y o  female presenting to outpatient physical therapy s/p (L) TKA  Patient describes chronic knee pain leading up to surgery  Patient displays with abnormal gait, abnormal posture, (L) knee AROM restrictions, (L) knee and hip strength deficits, and functional restrictions  Patient's symptoms are multifactorial in nature with a primary movement diagnosis of (L) knee hypomobility resulting in pathoanatomical signs and symptoms consistent with Primary osteoarthritis of left knee  (primary encounter diagnosis), Status post total left knee replacement resulting in limitations in her ability to ambulate independently and perform basic housework without restrictions  No further referral appears necessary at this time based upon examination results  Please contact me if you have any questions ([phone])  Thank you for the opportunity to share in the care of this patient  Impairments: abnormal gait, abnormal or restricted ROM, activity intolerance, impaired balance, impaired physical strength, lacks appropriate home exercise program and pain with function    Symptom irritability: moderateUnderstanding of Dx/Px/POC: good   Prognosis: good  Prognosis details: Positive prognostic indicators include positive attitude toward recovery, motivated to improve, good understanding of condition, realistic expectations  Negative prognostic indicators include chronicity of symptoms prior to surgery  Goals  STG to be met in 4 weeks:  - Increase (L) Knee AROM: 0-100 degrees  - Increase (L) Hip and Knee strength by 1/2 MMT grade    - Improve SL balance to 15 seconds  - I with HEP   - Patient will be able to ambulate without AD  LTG to be met in 8 weeks:  - Increase (L) Knee AROM: 0-115 degrees  - Increase (L) Hip and Knee strength to 4+/5 all planes  - Improve SL balance to 30 seconds  - I with updated HEP   - Patient will be able to return to performing housework and shopping without increased pain  Plan  Plan details: Prognosis above is given PT services 2x/week tapering to 1x/week over the next 8 weeks and home program adherence  Patient would benefit from: skilled physical therapy  Planned modality interventions: cryotherapy and thermotherapy: hydrocollator packs  Planned therapy interventions: joint mobilization, manual therapy, neuromuscular re-education, patient education, strengthening, stretching, therapeutic activities, therapeutic exercise, home exercise program, body mechanics training, activity modification, functional ROM exercises, gait training and balance  Plan of Care beginning date: 2023  Plan of Care expiration date: 2023  Treatment plan discussed with: patient        Subjective Evaluation    History of Present Illness  Date of surgery: 2023  Mechanism of injury: surgery  Mechanism of injury: At Evaluation (2023): Patient reports chronic history of (L) knee pain for over 3 years  Over the years she has managed her symptoms with activity modification, PT, and injections  She underwent (L) TKA on 23 - went home on 23  Red Flag Screen:  Patient denies recent fever, changes in bowel and bladder function, nausea and vomiting, weakness, unexplained weight loss, pain with coughing, or traumatic STANLEY   Patient reports numbness throughout the (L) knee   [employer]' Criteria for DVT Score = +1    Greatest concern: pain is very intense - limited functionally with daily tasks    Quality of life: good    Pain  Current pain ratin  At best pain ratin  At worst pain ratin  Location: (L) Knee  Quality: dull ache    Social Support  Steps to enter house: yes  Stairs in house: yes   Lives in: multiple-level home  Lives with: Family     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 )    Diagnostic Tests  X-ray: abnormal  Treatments  Previous treatment: injection treatment, medication and physical therapy  Patient Goals  Patient goal: Patient Specific Functional Scale: walk over 1 mile without AD 0/10, perform daily housework without restrictions 0/10, return to going shopping and going to Religion 0/10; Total 0/30        Objective     Static Posture   General Observations  Asymmetrical weight bearing and shifted right  Knee   Knee (Left): Flexed  Observations     Additional Observation Details  Bandage intact - no evidence of infection or drainage    Palpation     Additional Palpation Details  TTP throughout (L) knee    Active Range of Motion   Left Knee   Flexion: 80 degrees   Extension: -5 degrees     Right Knee   Flexion: 130 degrees   Extension: -2 degrees     Strength/Myotome Testing     Left Hip   Planes of Motion   Extension: 3+  Abduction: 3+    Right Hip   Planes of Motion   Extension: 4  Abduction: 4    Left Knee   Flexion: 3+  Extension: 3    Right Knee   Flexion: 4+  Extension: 4+    Additional Strength Details  Hip Ext MMT performed in supine  Hip ABD MMT performed in hooklying      Tests     Additional Tests Details  Additional special testing not performed secondary to post op status      Ambulation     Observational Gait   Gait: antalgic   Decreased walking speed, left stance time and left step length     Left foot contact pattern: foot flat    Additional Observational Gait Details  Step through pattern with FW Walker    Functional Assessment        Comments  TU 48 seconds             Daily Treatment Diary     DX: (L) TKA  EPOC: 23  Follow Up with Referring Provider: 23  Precautions: NA  CO-MORBIDITIES: Asthma, Lupus  HEP ACCESS CODE: [id number]    Stage: subacute   Stability of Symptoms:  At Evaluation: stable - unchanged  Global Rating of Change:   Symptom Irritability Level:   Primary Movement Diagnosis:   Patient Specific Functional Scale:   2/16/23: walk over 1 mile without AD 0/10, perform daily housework without restrictions 0/10, return to going shopping and going to Taoism 0/10;  Total 0/30  FOTO Prediction: 75 by visit 15  FOTO Progress: 49 at evaluation   Greatest Concern: pain is very intense - limited functionally with daily tasks  Current Activity Plan: added to HEP (2/16)  Current Educational Needs: progressions       Treatment Diary          Manual Therapy         (L) Knee PROM                                                               Therapeutic Exercise  HEP         Recumbent Bike          Ankle Pump 2/16         Supine Quad Set 2/16         Supine Heel Slide 2/16         Seated Quad Set 2/16         Seated Heel Slide 2/16         Long Sit Calf Stretch 2/16                             SLR          H/L TB Hip ABD          Bridge                    LAQ                    Neuromuscular Reeducation          SL Balance                    Standing Hip Flex          Standing Hip ABD          Standing Hip Ext          Standing HS Curl          TB TKE                    FWD Wt Shift          LAT Wt Shift          Mini Squat                    FWD Step Up          LAT Step Up                    Therapeutic Activity                              Gait Training                                        Modalities

## 2023-02-21 ENCOUNTER — OFFICE VISIT (OUTPATIENT)
Dept: OBGYN CLINIC | Facility: CLINIC | Age: 58
End: 2023-02-21

## 2023-02-21 ENCOUNTER — APPOINTMENT (OUTPATIENT)
Dept: RADIOLOGY | Facility: CLINIC | Age: 58
End: 2023-02-21

## 2023-02-21 ENCOUNTER — OFFICE VISIT (OUTPATIENT)
Dept: PHYSICAL THERAPY | Facility: CLINIC | Age: 58
End: 2023-02-21

## 2023-02-21 ENCOUNTER — APPOINTMENT (OUTPATIENT)
Dept: PHYSICAL THERAPY | Facility: CLINIC | Age: 58
End: 2023-02-21

## 2023-02-21 VITALS
WEIGHT: 159 LBS | DIASTOLIC BLOOD PRESSURE: 70 MMHG | HEIGHT: 60 IN | SYSTOLIC BLOOD PRESSURE: 112 MMHG | BODY MASS INDEX: 31.22 KG/M2

## 2023-02-21 DIAGNOSIS — Z47.1 AFTERCARE FOLLOWING LEFT KNEE JOINT REPLACEMENT SURGERY: ICD-10-CM

## 2023-02-21 DIAGNOSIS — Z96.652 AFTERCARE FOLLOWING LEFT KNEE JOINT REPLACEMENT SURGERY: ICD-10-CM

## 2023-02-21 DIAGNOSIS — M17.12 PRIMARY OSTEOARTHRITIS OF LEFT KNEE: Primary | ICD-10-CM

## 2023-02-21 DIAGNOSIS — Z47.1 AFTERCARE FOLLOWING LEFT KNEE JOINT REPLACEMENT SURGERY: Primary | ICD-10-CM

## 2023-02-21 DIAGNOSIS — Z96.652 STATUS POST TOTAL LEFT KNEE REPLACEMENT: ICD-10-CM

## 2023-02-21 DIAGNOSIS — Z96.652 AFTERCARE FOLLOWING LEFT KNEE JOINT REPLACEMENT SURGERY: Primary | ICD-10-CM

## 2023-02-21 NOTE — PROGRESS NOTES
Assessment:     1  Aftercare following left knee joint replacement surgery        Plan:     Problem List Items Addressed This Visit        Other    Aftercare following left knee joint replacement surgery - Primary     Emily Lewis is doing well s/p left total knee arthroplasty on 2/6/23  X-rays were reviewed with her that revealed a well aligned prosthesis with no evidence of loosening  She may get incision wet in the shower  No soaking for the next 1 to 2 weeks  She is to continue formal physical therapy  Continue aspirin for DVT prophylaxis for 6 weeks total   Continue ice and elevation  Follow-up in 3 months with repeat x-rays  All questions were answered to patient's satisfaction  Relevant Orders    XR knee 3 vw left non injury      Subjective:     Patient ID: Roni Rodriguez is a 56 y o  female  Chief Complaint: This is a 51-year-old female who is status post left total knee arthroplasty on February 6, 2023  She is currently residing at home going to outpatient physical therapy  She went for her first session last week  She is ambulating with a walker  She states she is using the compression stocking on a daily basis, but she did not wear it today  She denies any fevers or chills at home  Pain is controlled  She is taking aspirin for DVT prophylaxis  Allergy:  No Known Allergies  Medications:  all current active meds have been reviewed  Past Medical History:  Past Medical History:   Diagnosis Date   • Asthma    • Lupus ([address] )      Past Surgical History:  Past Surgical History:   Procedure Laterality Date   • FOOT SURGERY Right     Talonavicular arthrodesis with calcaneal osteotomy   • OR ARTHRP KNE CONDYLE&PLATU MEDIAL&LAT COMPARTMENTS Left 2/6/2023    Procedure: ARTHROPLASTY KNEE TOTAL;  Surgeon: Chrystal Spatz, MD;  Location:  MAIN OR;  Service: Orthopedics     Family History:  History reviewed  No pertinent family history    Social History:  Social History     Substance and Sexual Activity Alcohol Use Not Currently     Social History     Substance and Sexual Activity   Drug Use Never     Social History     Tobacco Use   Smoking Status Every Day   • Packs/day: 0 25   • Years: 20 00   • Pack years: 5 00   • Types: Cigarettes   Smokeless Tobacco Never     Review of Systems   Constitutional: Negative  HENT: Negative  Eyes: Negative  Respiratory: Negative  Cardiovascular: Negative  Gastrointestinal: Negative  Endocrine: Negative  Genitourinary: Negative  Musculoskeletal: Positive for arthralgias, gait problem (Using a walker) and joint swelling  Skin: Negative  Allergic/Immunologic: Negative  Hematological: Negative  Psychiatric/Behavioral: Negative  Objective:  BP Readings from Last 1 Encounters:   02/21/23 112/70      Wt Readings from Last 1 Encounters:   02/21/23 72 1 kg (159 lb)      BMI:   Estimated body mass index is 31 05 kg/m² as calculated from the following:    Height as of this encounter: 5' (1 524 m)  Weight as of this encounter: 72 1 kg (159 lb)  BSA:   Estimated body surface area is 1 69 meters squared as calculated from the following:    Height as of this encounter: 5' (1 524 m)  Weight as of this encounter: 72 1 kg (159 lb)  Physical Exam  Constitutional:       General: She is not in acute distress  Appearance: She is well-developed  HENT:      Head: Normocephalic  Eyes:      Conjunctiva/sclera: Conjunctivae normal       Pupils: Pupils are equal, round, and reactive to light  Pulmonary:      Effort: Pulmonary effort is normal  No respiratory distress  Skin:     General: Skin is warm and dry  Neurological:      Mental Status: She is alert and oriented to person, place, and time  Psychiatric:         Behavior: Behavior normal        Left Knee Exam     Tenderness   The patient is experiencing no tenderness       Range of Motion   Extension: -5   Flexion: 100     Tests   Varus: negative Valgus: negative    Other   Erythema: absent  Scars: present (Well-healing incision with no evidence of infection  No active drainage )  Sensation: normal  Pulse: present  Swelling: moderate    Comments:  Calf soft, compressible, nontender  Resolving ecchymosis in lower leg            I have personally reviewed pertinent films in PACS and my interpretation is X-rays of the left knee reveal a well aligned prosthesis with no evidence of loosening

## 2023-02-21 NOTE — PROGRESS NOTES
Daily Note     Today's date: 2023  Patient name: Marilyn Moritz  : 1965  MRN: [id number]  Referring provider: Rula Terry PA-C  Dx:   Encounter Diagnosis     ICD-10-CM    1  Primary osteoarthritis of left knee  M17 12       2  Status post total left knee replacement  Z96 652                      Subjective: Patient reports good tolerance to her LV  She notes her HEP is going well - has some pain with flexion  She is seeing improvement since starting PT  Objective: See treatment diary below      Assessment:   Stage: subacute   Stability of Symptoms:  At Evaluation: stable - unchanged  Global Rating of Change:   Symptom Irritability Level:   Primary Movement Diagnosis:   Patient Specific Functional Scale:   23: walk over 1 mile without AD 0/10, perform daily housework without restrictions 0/10, return to going shopping and going to [employer] 0/10; Total 0/30  FOTO Prediction: 75 by visit 15  FOTO Progress: 49 at evaluation   Greatest Concern: pain is very intense - limited functionally with daily tasks  Current Activity Plan: added to HEP ()  Current Educational Needs: progressions     Patient had good tolerance to manual stretching - ROM is progressing well  She demonstrated good exercise form and good progression of strength for this soon after surgery  She had some discomfort with standing exercises but noted no increased pain post treatment  Skilled PT continues to be required to guide progression of knee mobility and strength to allow her to return to performing all housework and community events without increased pain          Plan: Continue with POC - monitor tolerance to treatment - progress as able NV     Daily Treatment Diary     DX: (L) TKA  EPOC: 23  Follow Up with Referring Provider: 23  Precautions: NA  CO-MORBIDITIES: Asthma, Lupus  HEP ACCESS CODE: [id number]      Treatment Diary          Manual Therapy         (L) Knee PROM Seated Flex  8 min Therapeutic Exercise  HEP         Recumbent Bike  AROM  6 min        Ankle Pump 2/16         Supine Quad Set 2/16 5"x20        Supine Heel Slide 2/16 5"x20        Seated Quad Set 2/16         Seated Heel Slide 2/16         Long Sit Calf Stretch 2/16                             SLR  AAROM  Min A  15x        H/L TB Hip ABD  GTB  5"x20        Bridge                    LAQ  15x                  Neuromuscular Reeducation          SL Balance                    Standing Hip Flex  15x ea        Standing Hip ABD  15x ea        Standing Hip Ext  15x ea        Standing HS Curl  15x ea        TB TKE                    FWD Wt Shift  10x ea        LAT Wt Shift  10x ea        Mini Squat                    FWD Step Up          LAT Step Up                    Therapeutic Activity                              Gait Training                                        Modalities

## 2023-02-23 ENCOUNTER — OFFICE VISIT (OUTPATIENT)
Dept: PHYSICAL THERAPY | Facility: CLINIC | Age: 58
End: 2023-02-23

## 2023-02-23 ENCOUNTER — APPOINTMENT (OUTPATIENT)
Dept: PHYSICAL THERAPY | Facility: CLINIC | Age: 58
End: 2023-02-23

## 2023-02-23 DIAGNOSIS — M17.12 PRIMARY OSTEOARTHRITIS OF LEFT KNEE: Primary | ICD-10-CM

## 2023-02-23 DIAGNOSIS — Z96.652 STATUS POST TOTAL LEFT KNEE REPLACEMENT: ICD-10-CM

## 2023-02-23 NOTE — PROGRESS NOTES
Daily Note     Today's date: 2023  Patient name: Elaina Watt  : 1965  MRN: [id number]  Referring provider: Charisma Acevedo PA-C  Dx:   Encounter Diagnosis     ICD-10-CM    1  Primary osteoarthritis of left knee  M17 12       2  Status post total left knee replacement  Z96 652                      Subjective: Patient reports good tolerance to her LV  She notes her knee has been a little sore but she continues to exercise regularly at home  Objective: See treatment diary below      Assessment:   Stage: subacute   Stability of Symptoms:  At Evaluation: stable - unchanged  Global Rating of Change:   Symptom Irritability Level:   Primary Movement Diagnosis:   Patient Specific Functional Scale:   23: walk over 1 mile without AD 0/10, perform daily housework without restrictions 0/10, return to going shopping and going to [employer] 0/10; Total 0/30  FOTO Prediction: 75 by visit 15  FOTO Progress: 49 at evaluation   Greatest Concern: pain is very intense - limited functionally with daily tasks  Current Activity Plan: added to HEP ()  Current Educational Needs: progressions     Patient had good tolerance to manual stretching  She continues to improve with strength and mobility  She was able to tolerate progressions with repetitions well  She had no complaints of increased pain - only fatigue post treatment  Skilled PT continues to be required to guide progression of knee mobility and strength to allow her to return to performing all housework and community events without increased pain          Plan: Continue with POC - monitor tolerance to treatment - progress as able NV     Daily Treatment Diary     DX: (L) TKA  EPOC: 23  Follow Up with Referring Provider: 23  Precautions: NA  CO-MORBIDITIES: Asthma, Lupus  HEP ACCESS CODE: [id number]      Treatment Diary         Manual Therapy         (L) Knee PROM Seated Flex  8 min Seated Flex  8 min Therapeutic Exercise  HEP         Recumbent Bike  AROM  6 min AROM  6 min       Ankle Pump 2/16         Supine Quad Set 2/16 5"x20 5"x15       Supine Heel Slide 2/16 5"x20        Seated Quad Set 2/16         Seated Heel Slide 2/16         Long Sit Calf Stretch 2/16                             SLR  AAROM  Min A  15x AROM  10x2       H/L TB Hip ABD  GTB  5"x20 GTB  5"x20       Bridge                    LAQ  15x 20x                 Neuromuscular Reeducation          SL Balance                    Standing Hip Flex  15x ea 20 xea       Standing Hip ABD  15x ea 20x ea       Standing Hip Ext  15x ea 20x ea       Standing HS Curl  15x ea 20x ea       TB TKE                    FWD Wt Shift  10x ea 10x ea       LAT Wt Shift  10x ea 10x ea       Mini Squat                    FWD Step Up          LAT Step Up                    Therapeutic Activity                              Gait Training                                        Modalities

## 2023-02-27 ENCOUNTER — OFFICE VISIT (OUTPATIENT)
Dept: PHYSICAL THERAPY | Facility: CLINIC | Age: 58
End: 2023-02-27

## 2023-02-27 DIAGNOSIS — M17.12 PRIMARY OSTEOARTHRITIS OF LEFT KNEE: Primary | ICD-10-CM

## 2023-02-27 DIAGNOSIS — Z96.652 STATUS POST TOTAL LEFT KNEE REPLACEMENT: ICD-10-CM

## 2023-02-27 NOTE — PROGRESS NOTES
Daily Note     Today's date: 2023  Patient name: Anthony Bridges  : 1965  MRN: [id number]  Referring provider: Leora Houston PA-C  Dx:   Encounter Diagnosis     ICD-10-CM    1  Primary osteoarthritis of left knee  M17 12       2  Status post total left knee replacement  Z96 652                      Subjective: Patient reports good tolerance to her LV  She has transitioned to walking without AD at home or with [facility]  She feels she has turned a corner with her progress  Objective: See treatment diary below      Assessment:   Stage: subacute   Stability of Symptoms:  At Evaluation: stable - unchanged  Global Rating of Change:   Symptom Irritability Level:   Primary Movement Diagnosis:   Patient Specific Functional Scale:   23: walk over 1 mile without AD 0/10, perform daily housework without restrictions 0/10, return to going shopping and going to [employer] 0/10; Total 0/30  FOTO Prediction: 75 by visit 15  FOTO Progress: 49 at evaluation   Greatest Concern: pain is very intense - limited functionally with daily tasks  Current Activity Plan: added to HEP ()  Current Educational Needs: progressions     Patient had good tolerance to manual treatment  She is progressing very well with strengthening and mobility  She was able to perform S/L hip abduction and bridges today  She demonstrated good form with ambulating with SPC  Skilled PT continues to be required to guide progression of knee mobility and strength to allow her to return to performing all housework and community events without increased pain          Plan: Continue with POC - monitor tolerance to treatment - progress as able NV     Daily Treatment Diary     DX: (L) TKA  EPOC: 23  Follow Up with Referring Provider: 23  Precautions: NA  CO-MORBIDITIES: Asthma, Lupus  HEP ACCESS CODE: [id number]      Treatment Diary        Manual Therapy         (L) Knee PROM Seated Flex  8 min Seated Flex  8 min Seated  Flex  8 min                                                            Therapeutic Exercise  HEP         Recumbent Bike  AROM  6 min AROM  6 min AROM  6 min      Ankle Pump 2/16         Supine Quad Set 2/16 5"x20 5"x15       Supine Heel Slide 2/16 5"x20        Seated Quad Set 2/16         Seated Heel Slide 2/16         Long Sit Calf Stretch 2/16                             SLR  AAROM  Min A  15x AROM  10x2 AROM  10x2      H/L TB Hip ABD  GTB  5"x20 GTB  5"x20       S/L Hip ABD    15x ea      Bridge    5"x20                LAQ  15x 20x 20x                Neuromuscular Reeducation          SL Balance                    Standing Hip Flex  15x ea 20 xea 20x ea      Standing Hip ABD  15x ea 20x ea 20x ea      Standing Hip Ext  15x ea 20x ea 20x ea      Standing HS Curl  15x ea 20x ea 20x ea      TB TKE                    FWD Wt Shift  10x ea 10x ea 15x ea      LAT Wt Shift  10x ea 10x ea 15x ea      Mini Squat                    FWD Step Up          LAT Step Up                    Therapeutic Activity                              Gait Training                                        Modalities

## 2023-02-28 ENCOUNTER — APPOINTMENT (OUTPATIENT)
Dept: PHYSICAL THERAPY | Facility: CLINIC | Age: 58
End: 2023-02-28

## 2023-03-02 ENCOUNTER — APPOINTMENT (OUTPATIENT)
Dept: PHYSICAL THERAPY | Facility: CLINIC | Age: 58
End: 2023-03-02

## 2023-03-02 ENCOUNTER — OFFICE VISIT (OUTPATIENT)
Dept: PHYSICAL THERAPY | Facility: CLINIC | Age: 58
End: 2023-03-02

## 2023-03-02 DIAGNOSIS — M17.12 PRIMARY OSTEOARTHRITIS OF LEFT KNEE: Primary | ICD-10-CM

## 2023-03-02 DIAGNOSIS — Z96.652 STATUS POST TOTAL LEFT KNEE REPLACEMENT: ICD-10-CM

## 2023-03-02 NOTE — PROGRESS NOTES
Daily Note     Today's date: 3/2/2023  Patient name: Prince Fam  : 1965  MRN: [id number]  Referring provider: Deb Mckoy PA-C  Dx:   Encounter Diagnosis     ICD-10-CM    1  Primary osteoarthritis of left knee  M17 12       2  Status post total left knee replacement  Z96 [id number]                      Subjective: Patient reports she was more sore following her PT session on Monday - still sore today  She has been doing a modified HEP   Objective: See treatment diary below      Assessment:   Stage: subacute   Stability of Symptoms:  At Evaluation: stable - unchanged  Global Rating of Change:   Symptom Irritability Level:   Primary Movement Diagnosis:   Patient Specific Functional Scale:   23: walk over 1 mile without AD 0/10, perform daily housework without restrictions 0/10, return to going shopping and going to [employer] 0/10; Total 0/30  FOTO Prediction: 75 by visit 15  FOTO Progress: 49 at evaluation   Greatest Concern: pain is very intense - limited functionally with daily tasks  Current Activity Plan: added to HEP ()  Current Educational Needs: progressions     Patient had good tolerance to manual treatment  Treatment was modified secondary to soreness following her LV  She had good form throughout treatment  She noted feeling less pain post treatment  Skilled PT continues to be required to guide progression of knee mobility and strength to allow her to return to performing all housework and community events without increased pain          Plan: Continue with POC - monitor tolerance to treatment - progress as able NV     Daily Treatment Diary     DX: (L) TKA  EPOC: 23  Follow Up with Referring Provider: 23  Precautions: NA  CO-MORBIDITIES: Asthma, Lupus  HEP ACCESS CODE: [id number]      Treatment Diary  2/21 2/23 2/27 3/2     Manual Therapy         (L) Knee PROM Seated Flex  8 min Seated Flex  8 min Seated  Flex  8 min Seated Flex   8 min Therapeutic Exercise  HEP         Recumbent Bike  AROM  6 min AROM  6 min AROM  6 min AROM  6 min     Ankle Pump 2/16         Supine Quad Set 2/16 5"x20 5"x15       Supine Heel Slide 2/16 5"x20        Seated Quad Set 2/16         Seated Heel Slide 2/16         Long Sit Calf Stretch 2/16                             SLR  AAROM  Min A  15x AROM  10x2 AROM  10x2 AROM  20x     H/L TB Hip ABD  GTB  5"x20 GTB  5"x20       S/L Hip ABD    15x ea      Bridge    5"x20 5"x20               LAQ  15x 20x 20x 20x               Neuromuscular Reeducation          SL Balance                    Standing Hip Flex  15x ea 20 xea 20x ea 20x ea     Standing Hip ABD  15x ea 20x ea 20x ea 20x ea     Standing Hip Ext  15x ea 20x ea 20x ea 20x ea     Standing HS Curl  15x ea 20x ea 20x ea 20x ea     TB TKE                    FWD Wt Shift  10x ea 10x ea 15x ea 10x ea     LAT Wt Shift  10x ea 10x ea 15x ea 10x ea     Mini Squat                    FWD Step Up          LAT Step Up                    Therapeutic Activity                              Gait Training                                        Modalities

## 2023-03-06 ENCOUNTER — OFFICE VISIT (OUTPATIENT)
Dept: PHYSICAL THERAPY | Facility: CLINIC | Age: 58
End: 2023-03-06

## 2023-03-06 DIAGNOSIS — M17.12 PRIMARY OSTEOARTHRITIS OF LEFT KNEE: Primary | ICD-10-CM

## 2023-03-06 DIAGNOSIS — Z96.652 STATUS POST TOTAL LEFT KNEE REPLACEMENT: ICD-10-CM

## 2023-03-06 NOTE — PROGRESS NOTES
Daily Note     Today's date: 3/6/2023  Patient name: Finn Leyva  : 1965  MRN: [id number]  Referring provider: Chelo Johnson PA-C  Dx:   Encounter Diagnosis     ICD-10-CM    1  Primary osteoarthritis of left knee  M17 12       2  Status post total left knee replacement  Z96 652                      Subjective: Patient reports good tolerance to her LV  She notes she is still having medial knee pain - has more pain at night and has some difficulty with sleeping  Objective: See treatment diary below      Assessment:   Stage: subacute   Stability of Symptoms:  At Evaluation: stable - unchanged  Global Rating of Change:   Symptom Irritability Level:   Primary Movement Diagnosis:   Patient Specific Functional Scale:   23: walk over 1 mile without AD 0/10, perform daily housework without restrictions 0/10, return to going shopping and going to [employer] 0/10; Total 0/30  FOTO Prediction: 75 by visit 15  FOTO Progress: 49 at evaluation   Greatest Concern: pain is very intense - limited functionally with daily tasks  Current Activity Plan: added to HEP ()  Current Educational Needs: progressions     Patient had good tolerance to manual treatment - less pain following stretching and STM  She demonstrated good form with exercises  She continues to progress well with exercises  She is experiencing more post op pain but her pain resolves with activity  Skilled PT continues to be required to guide progression of knee mobility and strength to allow her to return to performing all housework and community events without increased pain          Plan: Continue with POC - monitor tolerance to treatment - progress as able NV     Daily Treatment Diary     DX: (L) TKA  EPOC: 23  Follow Up with Referring Provider: 23  Precautions: NA  CO-MORBIDITIES: Asthma, Lupus  HEP ACCESS CODE: [id number]      Treatment Diary  2/27 3/2 3/6      Manual Therapy         (L) Knee PROM Seated  Flex  8 min Seated Flex 8 min Seated Flex  6 min      (L) Knee STM   2 min                                                   Therapeutic Exercise  HEP         Recumbent Bike  AROM  6 min AROM  6 min AROM  6 min      Ankle Pump 2/16         Supine Quad Set 2/16         Supine Heel Slide 2/16         Seated Quad Set 2/16         Seated Heel Slide 2/16         Long Sit Calf Stretch 2/16                             SLR  AROM  10x2 AROM  20x 20x      H/L TB Hip ABD          S/L Hip ABD  15x ea  15x      Bridge  5"x20 5"x20 5"x20                LAQ  20x 20x 15x                 Neuromuscular Reeducation          SL Balance                    Standing Hip Flex  20x ea 20x ea 20x ea      Standing Hip ABD  20x ea 20x ea 20x ea      Standing Hip Ext  20x ea 20x ea 20x ea      Standing HS Curl  20x ea 20x ea 20x ea      TB TKE                    FWD Wt Shift  15x ea 10x ea 15x ea      LAT Wt Shift  15x ea 10x ea 15x ea      Mini Squat                    FWD Step Up          LAT Step Up                    Therapeutic Activity                              Gait Training                                        Modalities

## 2023-03-08 ENCOUNTER — OFFICE VISIT (OUTPATIENT)
Dept: PHYSICAL THERAPY | Facility: CLINIC | Age: 58
End: 2023-03-08

## 2023-03-08 DIAGNOSIS — Z96.652 STATUS POST TOTAL LEFT KNEE REPLACEMENT: ICD-10-CM

## 2023-03-08 DIAGNOSIS — M17.12 PRIMARY OSTEOARTHRITIS OF LEFT KNEE: Primary | ICD-10-CM

## 2023-03-08 NOTE — PROGRESS NOTES
Daily Note     Today's date: 3/8/2023  Patient name: Funmi Otoole  : 1965  MRN: [id number]  Referring provider: Bertha He PA-C  Dx:   Encounter Diagnosis     ICD-10-CM    1  Primary osteoarthritis of left knee  M17 12       2  Status post total left knee replacement  Z96 652                      Subjective: Patient reports no adverse reaction to her LV  She notes her knee is feeling better with current treatment  She reports walking is improving at home and she is not having as much difficulty with stair negotiation  Objective: See treatment diary below      Assessment:   Stage: subacute   Stability of Symptoms:  At Evaluation: stable - unchanged  Global Rating of Change:   Symptom Irritability Level:   Primary Movement Diagnosis:   Patient Specific Functional Scale:   23: walk over 1 mile without AD 0/10, perform daily housework without restrictions 0/10, return to going shopping and going to [employer] 0/10; Total 0/30  FOTO Prediction: 75 by visit 15  FOTO Progress: 49 at evaluation   Greatest Concern: pain is very intense - limited functionally with daily tasks  Current Activity Plan: added to HEP ()  Current Educational Needs: progressions     Patient continues to tolerance manual treatment well - ROM is improving  She had good tolerance to progressions with exercises - tolerated step ups well and lunges well  She continues to progress with PT  Skilled PT continues to be required to guide progression of knee mobility and strength to allow her to return to performing all housework and community events without increased pain          Plan: Continue with POC - monitor tolerance to treatment - progress as able NV     Daily Treatment Diary     DX: (L) TKA  EPOC: 23  Follow Up with Referring Provider: 23  Precautions: NA  CO-MORBIDITIES: Asthma, Lupus  HEP ACCESS CODE: [id number]      Treatment Diary  2/27 3/2 3/6 3/8     Manual Therapy         (L) Knee PROM Seated  Flex  8 min Seated Flex   8 min Seated Flex  6 min Seated  Flex  6 min     (L) Knee STM   2 min 2 min                                                  Therapeutic Exercise  HEP         Recumbent Bike  AROM  6 min AROM  6 min AROM  6 min AROM  6 min     Ankle Pump 2/16         Supine Quad Set 2/16         Supine Heel Slide 2/16         Seated Quad Set 2/16         Seated Heel Slide 2/16         Long Sit Calf Stretch 2/16                             SLR  AROM  10x2 AROM  20x 20x 20x     H/L TB Hip ABD          S/L Hip ABD  15x ea  15x 20x ea     Bridge  5"x20 5"x20 5"x20 5"x20               LAQ  20x 20x 15x  20x               Neuromuscular Reeducation          SL Balance                    Standing Hip Flex  20x ea 20x ea 20x ea 20x ea     Standing Hip ABD  20x ea 20x ea 20x ea 20x ea     Standing Hip Ext  20x ea 20x ea 20x ea 20x ea     Standing HS Curl  20x ea 20x ea 20x ea 20x ea     TB TKE                    FWD Wt Shift  15x ea 10x ea 15x ea      LAT Wt Shift  15x ea 10x ea 15x ea      FWD Lunge     10x ea     LAT Lunge     10x ea               Mini Squat                    FWD Step Up     6" Step  10x      LAT Step Up     6" Step  10x               Therapeutic Activity                              Gait Training                                        Modalities

## 2023-03-09 ENCOUNTER — APPOINTMENT (OUTPATIENT)
Dept: PHYSICAL THERAPY | Facility: CLINIC | Age: 58
End: 2023-03-09

## 2023-03-13 ENCOUNTER — OFFICE VISIT (OUTPATIENT)
Dept: PHYSICAL THERAPY | Facility: CLINIC | Age: 58
End: 2023-03-13

## 2023-03-13 DIAGNOSIS — M17.12 PRIMARY OSTEOARTHRITIS OF LEFT KNEE: Primary | ICD-10-CM

## 2023-03-13 DIAGNOSIS — Z96.652 STATUS POST TOTAL LEFT KNEE REPLACEMENT: ICD-10-CM

## 2023-03-13 NOTE — PROGRESS NOTES
Daily Note     Today's date: 3/13/2023  Patient name: Sheldon Mcgowan  : 1965  MRN: [id number]  Referring provider: Celia Stafford PA-C  Dx:   Encounter Diagnosis     ICD-10-CM    1  Primary osteoarthritis of left knee  M17 12       2  Status post total left knee replacement  Z96 652                      Subjective: Patient reports good tolerance to her LV  She notes walking is improving - using her SPC less and less  HEP continues to go well  Objective: See treatment diary below      Assessment:   Stage: subacute   Stability of Symptoms:  At Evaluation: stable - unchanged  Global Rating of Change:   Symptom Irritability Level:   Primary Movement Diagnosis:   Patient Specific Functional Scale:   23: walk over 1 mile without AD 0/10, perform daily housework without restrictions 0/10, return to going shopping and going to [employer] 0/10; Total 0/30  FOTO Prediction: 75 by visit 15  FOTO Progress: 49 at evaluation   Greatest Concern: pain is very intense - limited functionally with daily tasks  Current Activity Plan: added to HEP ()  Current Educational Needs: progressions     Patient had good tolerance to manual treatment - performed prior to bike  She demonstrated good form and tolerance with progressions  She had no increased pain post treatment  Skilled PT continues to be required to guide progression of knee mobility and strength to allow her to return to performing all housework and community events without increased pain          Plan: Continue with POC - monitor tolerance to treatment - progress as able NV     Daily Treatment Diary     DX: (L) TKA  EPOC: 23  Follow Up with Referring Provider: 23  Precautions: NA  CO-MORBIDITIES: Asthma, Lupus  HEP ACCESS CODE: [id number]      Treatment Diary  3/2 3/6 3/8 3/13     Manual Therapy         (L) Knee PROM Seated Flex   8 min Seated Flex  6 min Seated  Flex  6 min Seated Flex  6 min     (L) Knee STM  2 min 2 min 2 min Therapeutic Exercise  HEP         Recumbent Bike  AROM  6 min AROM  6 min AROM  6 min AROM  6 min     Ankle Pump 2/16         Supine Quad Set 2/16         Supine Heel Slide 2/16         Seated Quad Set 2/16         Seated Heel Slide 2/16         Long Sit Calf Stretch 2/16                             SLR  AROM  20x 20x 20x 20x ea     H/L TB Hip ABD          S/L Hip ABD   15x 20x ea 20x ea     Bridge  5"x20 5"x20 5"x20 5"x20               LAQ  20x 15x  20x 2 5# 20x               Neuromuscular Reeducation          SL Balance                    Standing Hip Flex  20x ea 20x ea 20x ea 1 5# 20x ea     Standing Hip ABD  20x ea 20x ea 20x ea 1 5# 20x ea     Standing Hip Ext  20x ea 20x ea 20x ea 1 5# 20x ea     Standing HS Curl  20x ea 20x ea 20x ea 1 5# 20x ea     TB TKE                    FWD Wt Shift  10x ea 15x ea       LAT Wt Shift  10x ea 15x ea       FWD Lunge    10x ea 15x ea     LAT Lunge    10x ea 15x ea               Mini Squat     15x               FWD Step Up    6" Step  10x       LAT Step Up    6" Step  10x                Therapeutic Activity                              Gait Training                                        Modalities

## 2023-03-16 ENCOUNTER — OFFICE VISIT (OUTPATIENT)
Dept: PHYSICAL THERAPY | Facility: CLINIC | Age: 58
End: 2023-03-16

## 2023-03-16 DIAGNOSIS — Z96.652 STATUS POST TOTAL LEFT KNEE REPLACEMENT: ICD-10-CM

## 2023-03-16 DIAGNOSIS — M17.12 PRIMARY OSTEOARTHRITIS OF LEFT KNEE: Primary | ICD-10-CM

## 2023-03-16 NOTE — PROGRESS NOTES
Daily Note     Today's date: 3/16/2023  Patient name: Elizabeth Siddiqui  : 1965  MRN: [id number]  Referring provider: Netta Sainz PA-C  Dx:   Encounter Diagnosis     ICD-10-CM    1  Primary osteoarthritis of left knee  M17 12       2  Status post total left knee replacement  Z96 [id number]                     Subjective: Patient reports having some increased pain following her LV - lasted about 1 5 days  She notes today she is a little sore in the knee  HEP is going well  Objective: See treatment diary below      Assessment:   Stage: subacute   Stability of Symptoms:  At Evaluation: stable - unchanged  Global Rating of Change:   Symptom Irritability Level:   Primary Movement Diagnosis:   Patient Specific Functional Scale:   23: walk over 1 mile without AD 0/10, perform daily housework without restrictions 0/10, return to going shopping and going to [employer] 0/10; Total 0/30  FOTO Prediction: 75 by visit 15  FOTO Progress: 49 at evaluation   Greatest Concern: pain is very intense - limited functionally with daily tasks  Current Activity Plan: added to HEP ()  Current Educational Needs: progressions     Patient had good tolerance to manual treatment - ROM continues to improve  She was challenged with exercises but noted no increased pain post treatment  Skilled PT continues to be required to guide progression of knee mobility and strength to allow her to return to performing all housework and community events without increased pain  Plan: Continue with POC - monitor tolerance to treatment - progress as able NV with step up exercises         Daily Treatment Diary     DX: (L) TKA  EPOC: 23  Follow Up with Referring Provider: 23  Precautions: NA  CO-MORBIDITIES: Asthma, Lupus  HEP ACCESS CODE: [id number]      Treatment Diary  3/6 3/8 3/13 3/16      Manual Therapy          (L) Knee PROM Seated Flex  6 min Seated  Flex  6 min Seated Flex  6 min Seated  Flex  6 min      (L) Knee STM 2 min 2 min 2 min 2 min                                                        Therapeutic Exercise  HEP          Recumbent Bike  AROM  6 min AROM  6 min AROM  6 min AROM  6 min      Ankle Pump 2/16          Supine Quad Set 2/16          Supine Heel Slide 2/16          Seated Quad Set 2/16          Seated Heel Slide 2/16          Long Sit Calf Stretch 2/16                                SLR  20x 20x 20x ea 1# 20x ea      H/L TB Hip ABD           S/L Hip ABD  15x 20x ea 20x ea 1# 20x ea      Bridge  5"x20 5"x20 5"x20 5"x20                 LAQ  15x  20x 2 5# 20x 2 5# 20x                 Neuromuscular Reeducation           SL Balance                      Standing Hip Flex  20x ea 20x ea 1 5# 20x ea 1 5# 20x ea      Standing Hip ABD  20x ea 20x ea 1 5# 20x ea 1 5# 20x ea      Standing Hip Ext  20x ea 20x ea 1 5# 20x ea 1 5# 20x ea      Standing HS Curl  20x ea 20x ea 1 5# 20x ea 1 5# 20x ea      TB TKE                      FWD Wt Shift  15x ea         LAT Wt Shift  15x ea         FWD Lunge   10x ea 15x ea 15x ea      LAT Lunge   10x ea 15x ea 15x ea                 Mini Squat    15x 20x                 FWD Step Up   6" Step  10x   NV      LAT Step Up   6" Step  10x  NV                 Therapeutic Activity                                 Gait Training                                            Modalities

## 2023-03-20 ENCOUNTER — OFFICE VISIT (OUTPATIENT)
Dept: PHYSICAL THERAPY | Facility: CLINIC | Age: 58
End: 2023-03-20

## 2023-03-20 DIAGNOSIS — M17.12 PRIMARY OSTEOARTHRITIS OF LEFT KNEE: Primary | ICD-10-CM

## 2023-03-20 DIAGNOSIS — Z96.652 STATUS POST TOTAL LEFT KNEE REPLACEMENT: ICD-10-CM

## 2023-03-20 NOTE — PROGRESS NOTES
Daily Note     Today's date: 3/20/2023  Patient name: Shivani Munoz  : 1965  MRN: [id number]  Referring provider: Alka Belle PA-C  Dx:   Encounter Diagnosis     ICD-10-CM    1  Primary osteoarthritis of left knee  M17 12       2  Status post total left knee replacement  Z96 [id number]                     Subjective: Patient reports good tolerance to her LV  She notes she is feeling better with walking  Stair negotiation continue to be challenging  Objective: See treatment diary below      Assessment:   Stage: subacute   Stability of Symptoms:  At Evaluation: stable - unchanged  Global Rating of Change:   Symptom Irritability Level:   Primary Movement Diagnosis:   Patient Specific Functional Scale:   23: walk over 1 mile without AD 0/10, perform daily housework without restrictions 0/10, return to going shopping and going to [employer] 0/10; Total 0/30  FOTO Prediction: 75 by visit 15  FOTO Progress: 49 at evaluation   Greatest Concern: pain is very intense - limited functionally with daily tasks  Current Activity Plan: added to HEP ()  Current Educational Needs: progressions     Patient continues to tolerate manual treatment well  She demonstrated good form with mat-based exercises  Step ups were tolerated without increased pain today  Skilled PT continues to be required to guide progression of knee mobility and strength to allow her to return to performing all housework and community events without increased pain  Plan: Continue with POC - monitor tolerance to treatment - progress as able NV         Daily Treatment Diary     DX: (L) TKA  EPOC: 23  Follow Up with Referring Provider: 23  Precautions: NA  CO-MORBIDITIES: Asthma, Lupus  HEP ACCESS CODE: [id number]      Treatment Diary  3/8 3/13 3/16 3/20     Manual Therapy         (L) Knee PROM Seated  Flex  6 min Seated Flex  6 min Seated  Flex  6 min Seated Flex  6 min     (L) Knee STM 2 min 2 min 2 min 2 min Therapeutic Exercise  HEP         Recumbent Bike  AROM  6 min AROM  6 min AROM  6 min AROM  6 min     Ankle Pump 2/16         Supine Quad Set 2/16         Supine Heel Slide 2/16         Seated Quad Set 2/16         Seated Heel Slide 2/16         Long Sit Calf Stretch 2/16                             SLR  20x 20x ea 1# 20x ea 1# 20x ea     H/L TB Hip ABD          S/L Hip ABD  20x ea 20x ea 1# 20x ea 1# 20x ea     Bridge  5"x20 5"x20 5"x20 5"x20               LAQ  20x 2 5# 20x 2 5# 20x 2 5# 20x               Neuromuscular Reeducation          SL Balance                    Standing Hip Flex  20x ea 1 5# 20x ea 1 5# 20x ea      Standing Hip ABD  20x ea 1 5# 20x ea 1 5# 20x ea      Standing Hip Ext  20x ea 1 5# 20x ea 1 5# 20x ea      Standing HS Curl  20x ea 1 5# 20x ea 1 5# 20x ea      TB TKE                    FWD Wt Shift          LAT Wt Shift          FWD Lunge  10x ea 15x ea 15x ea 15x ea     LAT Lunge  10x ea 15x ea 15x ea 15x ea               Mini Squat   15x 20x 20x               FWD Step Up  6" Step  10x   NV 6" Step  20x ea     LAT Step Up  6" Step  10x  NV 6" Step  20x ea               Therapeutic Activity                              Gait Training                                        Modalities

## 2023-03-23 ENCOUNTER — APPOINTMENT (OUTPATIENT)
Dept: PHYSICAL THERAPY | Facility: CLINIC | Age: 58
End: 2023-03-23

## 2023-03-27 ENCOUNTER — OFFICE VISIT (OUTPATIENT)
Dept: PHYSICAL THERAPY | Facility: CLINIC | Age: 58
End: 2023-03-27

## 2023-03-27 DIAGNOSIS — Z96.652 STATUS POST TOTAL LEFT KNEE REPLACEMENT: ICD-10-CM

## 2023-03-27 DIAGNOSIS — M17.12 PRIMARY OSTEOARTHRITIS OF LEFT KNEE: Primary | ICD-10-CM

## 2023-03-27 NOTE — PROGRESS NOTES
"Daily Note     Today's date: 3/27/2023  Patient name: Maricruz Hamilton  : 1965  MRN: [id number]  Referring provider: Chelsea Hankins PA-C  Dx:   Encounter Diagnosis     ICD-10-CM    1  Primary osteoarthritis of left knee  M17 12       2  Status post total left knee replacement  Z96 652                     Subjective: Patient reports she was a little more sore following her LV  She notes ascending stairs has improved but she is still having some restrictions with control for descending steps  Objective: See treatment diary below      Assessment:   Stage: subacute   Stability of Symptoms:  At Evaluation: stable - unchanged  Global Rating of Change:   Symptom Irritability Level:   Primary Movement Diagnosis:   Patient Specific Functional Scale:   23: walk over 1 mile without AD 0/10, perform daily housework without restrictions 0/10, return to going shopping and going to [employer] 0/10; Total 0/30  FOTO Prediction: 75 by visit 15  FOTO Progress: 49 at evaluation   Greatest Concern: pain is very intense - limited functionally with daily tasks  Current Activity Plan: added to HEP ()  Current Educational Needs: progressions     Patient continues to respond well to manual treatment  She demonstrated good form and tolerance with new balance exercise - challenged with reaching outside of her base of support  She had good tolerance with step down exercise with 6\" step - noted improvement with more repetitions  She had no complaints of increased pain post treatment  Skilled PT continues to be required to guide progression of knee mobility and strength to allow her to return to performing all housework and community events without increased pain  Plan: Continue with POC - monitor tolerance to treatment - progress as able NV         Daily Treatment Diary     DX: (L) TKA  EPOC: 23  Follow Up with Referring Provider: 23  Precautions: NA  CO-MORBIDITIES: Asthma, Lupus  HEP ACCESS CODE: " "[id number]      Treatment Diary  3/13 3/16 3/20 3/27     Manual Therapy         (L) Knee PROM Seated Flex  6 min Seated  Flex  6 min Seated Flex  6 min Seated  Flex  6 min     (L) Knee STM 2 min 2 min 2 min 2 min                                                  Therapeutic Exercise  HEP         Recumbent Bike  AROM  6 min AROM  6 min AROM  6 min AROM  6 min     Ankle Pump 2/16         Supine Quad Set 2/16         Supine Heel Slide 2/16         Seated Quad Set 2/16         Seated Heel Slide 2/16         Long Sit Calf Stretch 2/16                             SLR  20x ea 1# 20x ea 1# 20x ea 0# 20x ea     H/L TB Hip ABD          S/L Hip ABD  20x ea 1# 20x ea 1# 20x ea 0# 20x ea     Bridge  5\"x20 5\"x20 5\"x20 5\"x20               LAQ  2 5# 20x 2 5# 20x 2 5# 20x                Neuromuscular Reeducation          SL Balance     With Reach  5 Way  3x ea               Standing Hip Flex  1 5# 20x ea 1 5# 20x ea       Standing Hip ABD  1 5# 20x ea 1 5# 20x ea       Standing Hip Ext  1 5# 20x ea 1 5# 20x ea       Standing HS Curl  1 5# 20x ea 1 5# 20x ea       TB TKE                    FWD Wt Shift          LAT Wt Shift          FWD Lunge  15x ea 15x ea 15x ea 15x ea     LAT Lunge  15x ea 15x ea 15x ea 15x ea               Mini Squat  15x 20x 20x 20x               FWD Step Up   NV 6\" Step  20x ea Up and Over  6\" Step  10x ea     LAT Step Up   NV 6\" Step  20x ea Up and Over   6\" Step  20x ea               Therapeutic Activity                              Gait Training                                        Modalities                                          "

## 2023-03-30 ENCOUNTER — OFFICE VISIT (OUTPATIENT)
Dept: PHYSICAL THERAPY | Facility: CLINIC | Age: 58
End: 2023-03-30

## 2023-03-30 DIAGNOSIS — M17.12 PRIMARY OSTEOARTHRITIS OF LEFT KNEE: Primary | ICD-10-CM

## 2023-03-30 DIAGNOSIS — Z96.652 STATUS POST TOTAL LEFT KNEE REPLACEMENT: ICD-10-CM

## 2023-03-30 NOTE — PROGRESS NOTES
Daily Note     Today's date: 3/30/2023  Patient name: Nellie Martel  : 1965  MRN: [id number]  Referring provider: Phong Anthony PA-C  Dx:   Encounter Diagnosis     ICD-10-CM    1  Primary osteoarthritis of left knee  M17 12       2  Status post total left knee replacement  Z96 652                     Subjective: Patient reports good tolerance to her LV  She notes descending stairs continues to be challenging but she is feeling better overall with this activity  Her incision sensitivity is also improving  Objective: See treatment diary below      Assessment:   Stage: subacute   Stability of Symptoms:  At Evaluation: stable - unchanged  Global Rating of Change:   Symptom Irritability Level:   Primary Movement Diagnosis:   Patient Specific Functional Scale:   23: walk over 1 mile without AD 0/10, perform daily housework without restrictions 0/10, return to going shopping and going to [employer] 0/10; Total 030  FOTO Prediction: 75 by visit 15  FOTO Progress: 49 at evaluation   Greatest Concern: pain is very intense - limited functionally with daily tasks  Current Activity Plan: added to HEP ()  Current Educational Needs: progressions     Patient had good tolerance to manual treatment  She continues to improve with strength and control for stair negotiation  She was able to perform step downs with improved control - progressed with repetitions  She had no complaints of increased pain post treatment  Skilled PT continues to be required to guide progression of knee mobility and strength to allow her to return to performing all housework and community events without increased pain  Plan: Continue with POC - monitor tolerance to treatment - progress as able NV         Daily Treatment Diary     DX: (L) TKA  EPOC: 23  Follow Up with Referring Provider: 23  Precautions: NA  CO-MORBIDITIES: Asthma, Lupus  HEP ACCESS CODE: [id number]      Treatment Diary  3/16 3/20 3/27 3/30      Manual "Therapy          (L) Knee PROM Seated  Flex  6 min Seated Flex  6 min Seated  Flex  6 min Seated  Flex  6 min      (L) Knee STM 2 min 2 min 2 min 2 min                                                        Therapeutic Exercise  HEP          Recumbent Bike  AROM  6 min AROM  6 min AROM  6 min AROM  6 min      Ankle Pump 2/16          Supine Quad Set 2/16          Supine Heel Slide 2/16          Seated Quad Set 2/16          Seated Heel Slide 2/16          Long Sit Calf Stretch 2/16                                SLR  1# 20x ea 1# 20x ea 0# 20x ea 1# 20x ea      H/L TB Hip ABD           S/L Hip ABD  1# 20x ea 1# 20x ea 0# 20x ea 1# 20x ea      Bridge  5\"x20 5\"x20 5\"x20 5\"x20                 LAQ  2 5# 20x 2 5# 20x                   Neuromuscular Reeducation           SL Balance    With Reach  5 Way  3x ea With Reach  5 Way  3x ea                 Standing Hip Flex  1 5# 20x ea         Standing Hip ABD  1 5# 20x ea         Standing Hip Ext  1 5# 20x ea         Standing HS Curl  1 5# 20x ea         TB TKE                      FWD Wt Shift           LAT Wt Shift           FWD Lunge  15x ea 15x ea 15x ea 15x ea      LAT Lunge  15x ea 15x ea 15x ea 15x ea                 Mini Squat  20x 20x 20x 20x                 FWD Step Up  NV 6\" Step  20x ea Up and Over  6\" Step  10x ea Up and Over  6\" Step  20x ea      LAT Step Up  NV 6\" Step  20x ea Up and Over   6\" Step  20x ea Up and Over  6\" Step  20x ea                 Therapeutic Activity                                 Gait Training                                            Modalities                                             "

## 2023-04-03 ENCOUNTER — OFFICE VISIT (OUTPATIENT)
Dept: PHYSICAL THERAPY | Facility: CLINIC | Age: 58
End: 2023-04-03

## 2023-04-03 DIAGNOSIS — M17.12 PRIMARY OSTEOARTHRITIS OF LEFT KNEE: Primary | ICD-10-CM

## 2023-04-03 DIAGNOSIS — Z96.652 STATUS POST TOTAL LEFT KNEE REPLACEMENT: ICD-10-CM

## 2023-04-03 NOTE — PROGRESS NOTES
Daily Note     Today's date: 4/3/2023  Patient name: Darryl Casarez  : 1965  MRN: [id number]  Referring provider: Melanie Duffy PA-C  Dx:   Encounter Diagnosis     ICD-10-CM    1  Primary osteoarthritis of left knee  M17 12       2  Status post total left knee replacement  Z96 652                     Subjective: Patient reports good tolerance to her LV  She notes she is feeling much better overall  Stair negotiation is going well - less pain with descending  Objective: See treatment diary below      Assessment:   Stage: subacute   Stability of Symptoms:  At Evaluation: stable - unchanged  Global Rating of Change:   Symptom Irritability Level:   Primary Movement Diagnosis:   Patient Specific Functional Scale:   23: walk over 1 mile without AD 0/10, perform daily housework without restrictions 0/10, return to going shopping and going to [employer] 0/10; Total 030  FOTO Prediction: 75 by visit 15  FOTO Progress: 49 at evaluation   Greatest Concern: pain is very intense - limited functionally with daily tasks  Current Activity Plan: added to HEP ()  Current Educational Needs: progressions     Patient continues to respond well to manual treatment - mobility is significantly improved  She demonstrated improved form with step progression today - able to descend with (L) leg with good form and control  She continues to fatigue with exercises but noted no increased pain post treatment  Skilled PT continues to be required to guide progression of knee mobility and strength to allow her to return to performing all housework and community events without increased pain  Plan: Continue with POC - monitor tolerance to treatment - progress as able NV         Daily Treatment Diary     DX: (L) TKA  EPOC: 23  Follow Up with Referring Provider: 23  Precautions: NA  CO-MORBIDITIES: Asthma, Lupus  HEP ACCESS CODE: [id number]      Treatment Diary  3/16 3/20 3/27 3/30 4/3     Manual Therapy "  (L) Knee PROM Seated  Flex  6 min Seated Flex  6 min Seated  Flex  6 min Seated  Flex  6 min Seated Flex  8 min     (L) Knee STM 2 min 2 min 2 min 2 min                                                        Therapeutic Exercise  HEP          Recumbent Bike  AROM  6 min AROM  6 min AROM  6 min AROM  6 min AROM  6 min     Ankle Pump 2/16          Supine Quad Set 2/16          Supine Heel Slide 2/16          Seated Quad Set 2/16          Seated Heel Slide 2/16          Long Sit Calf Stretch 2/16                                SLR  1# 20x ea 1# 20x ea 0# 20x ea 1# 20x ea 1# 20x ea     H/L TB Hip ABD           S/L Hip ABD  1# 20x ea 1# 20x ea 0# 20x ea 1# 20x ea 1# 20x ea     Bridge  5\"x20 5\"x20 5\"x20 5\"x20      PHE      0# 10x ea     LAQ  2 5# 20x 2 5# 20x                   Neuromuscular Reeducation           SL Balance    With Reach  5 Way  3x ea With Reach  5 Way  3x ea With Reach  5 Way  5x ea                Standing Hip Flex  1 5# 20x ea         Standing Hip ABD  1 5# 20x ea         Standing Hip Ext  1 5# 20x ea         Standing HS Curl  1 5# 20x ea         TB TKE                      FWD Wt Shift           LAT Wt Shift           FWD Lunge  15x ea 15x ea 15x ea 15x ea 15x ea     LAT Lunge  15x ea 15x ea 15x ea 15x ea 15xea                Mini Squat  20x 20x 20x 20x 20x                FWD Step Up  NV 6\" Step  20x ea Up and Over  6\" Step  10x ea Up and Over  6\" Step  20x ea Up and Over  8\" Step  20x ea     LAT Step Up  NV 6\" Step  20x ea Up and Over   6\" Step  20x ea Up and Over  6\" Step  20x ea Up and Over  8\" Step  20x ea                Therapeutic Activity                                 Gait Training                                            Modalities                                             "

## 2023-04-06 ENCOUNTER — APPOINTMENT (OUTPATIENT)
Dept: PHYSICAL THERAPY | Facility: CLINIC | Age: 58
End: 2023-04-06

## 2023-04-13 ENCOUNTER — APPOINTMENT (OUTPATIENT)
Dept: PHYSICAL THERAPY | Facility: CLINIC | Age: 58
End: 2023-04-13

## 2023-04-26 ENCOUNTER — OFFICE VISIT (OUTPATIENT)
Dept: PHYSICAL THERAPY | Facility: CLINIC | Age: 58
End: 2023-04-26

## 2023-04-26 DIAGNOSIS — Z96.652 STATUS POST TOTAL LEFT KNEE REPLACEMENT: Primary | ICD-10-CM

## 2023-04-26 DIAGNOSIS — M17.12 PRIMARY OSTEOARTHRITIS OF LEFT KNEE: ICD-10-CM

## 2023-04-26 NOTE — PROGRESS NOTES
Daily Note     Today's date: 2023  Patient name: Geovany Hastings  : 1965  MRN: [id number]  Referring provider: Zaina Rivera PA-C  Dx:   Encounter Diagnosis     ICD-10-CM    1  Status post total left knee replacement  Z96 652       2  Primary osteoarthritis of left knee  M17 12                      Subjective: She reports L knee is feeling good today but her R knee has been sore and bothering her more  She continues to have difficulty with stair negotiation but attributes this more to (R) knee at this time  Objective: See treatment diary below      Assessment:   Stage: subacute   Stability of Symptoms:  At Evaluation: stable - unchanged  Global Rating of Change:   Symptom Irritability Level:   Primary Movement Diagnosis:   Patient Specific Functional Scale:   23: walk over 1 mile without AD 0/10, perform daily housework without restrictions 0/10, return to going shopping and going to [employer] 0/10; Total 0/30  23: walk over 1 mile without AD 5/10, perform daily housework without restrictions 5/10, return to going shopping and going to [employer] 8/10; Total 18/30  FOTO Prediction: 75 by visit 15  FOTO Progress: 49 at evaluation   Greatest Concern: pain is very intense - limited functionally with daily tasks  Current Activity Plan: added to HEP ()  Current Educational Needs: progressions     Patient tolerated treatment well  Observed improved knee flexion ROM with seated PROM - reported no discomfort  Able to complete exercises with good form - reported fatigue and mild pain in L knee during FWD step ups  Completed one round of SL 5 way reaches without hand support but required hand support for other rounds due to poor balance  Skilled PT continues to be required to guide progression of strength and mobility to allow her to perform shopping and housework without restrictions             Plan: Continue with POC - monitor tolerance to treatment - progress as able NV     Daily Treatment Diary "    DX: (L) TKA  EPOC: 5/18/23  Follow Up with Referring Provider: 2/21/23  Precautions: NA  CO-MORBIDITIES: Asthma, Lupus  HEP ACCESS CODE: [id number]        Treatment Diary  3/30 4/3 4/11 4/17 4/26     Manual Therapy          (L) Knee PROM Seated  Flex  6 min Seated Flex  8 min CF Seated Flex  8 min Seated flex   8 min     (L) Knee STM 2 min                                                           Therapeutic Exercise  HEP          Recumbent Bike  AROM  6 min AROM  6 min AROM 6 min  AROM  6 min AROM 6 min     Ankle Pump 2/16          Supine Quad Set 2/16          Supine Heel Slide 2/16          Seated Quad Set 2/16          Seated Heel Slide 2/16          Long Sit Calf Stretch 2/16                                SLR  1# 20x ea 1# 20x ea 1# 20x   1# 20x ea     H/L TB Hip ABD           S/L Hip ABD  1# 20x ea 1# 20x ea 1# 20x   1# 20x ea     Bridge  5\"x20    5\"x20     PHE   0# 10x ea 1# 10x        LAQ                      Neuromuscular Reeducation           SL Balance  With Reach  5 Way  3x ea With Reach  5 Way  5x ea   With Reach  5 Way  5x ea  With reach 5 way 5x ea                Standing Hip Flex           Standing Hip ABD           Standing Hip Ext           Standing HS Curl           TB TKE                      FWD Wt Shift           LAT Wt Shift           FWD Lunge  15x ea 15x ea 15x ea        LAT Lunge  15x ea 15xea 15x ea                   Mini Squat  20x 20x 20x   20x                FWD Step Up  Up and Over  6\" Step  20x ea Up and Over  8\" Step  20x ea Up and Over  8\" Step  20x ea  Up and over 8\" step 20x ea     LAT Step Up  Up and Over  6\" Step  20x ea Up and Over  8\" Step  20x ea Up and Over  8\" Step  20x ea  Up and over 8\" step 20x ea                Therapeutic Activity                                 Gait Training                                            Modalities                                             "

## 2023-04-28 ENCOUNTER — APPOINTMENT (OUTPATIENT)
Dept: PHYSICAL THERAPY | Facility: CLINIC | Age: 58
End: 2023-04-28

## 2023-05-01 ENCOUNTER — OFFICE VISIT (OUTPATIENT)
Dept: PHYSICAL THERAPY | Facility: CLINIC | Age: 58
End: 2023-05-01

## 2023-05-01 DIAGNOSIS — Z96.652 STATUS POST TOTAL LEFT KNEE REPLACEMENT: Primary | ICD-10-CM

## 2023-05-01 DIAGNOSIS — M17.12 PRIMARY OSTEOARTHRITIS OF LEFT KNEE: ICD-10-CM

## 2023-05-01 NOTE — PROGRESS NOTES
Daily Note     Today's date: 2023  Patient name: Consuelo Nava  : 1965  MRN: [id number]  Referring provider: Katlin Bai PA-C  Dx:   Encounter Diagnosis     ICD-10-CM    1  Status post total left knee replacement  Z96 652       2  Primary osteoarthritis of left knee  M17 12                      Subjective: She reports L knee is feeling good today and continues to feel numb on the lateral aspect  Notes she was out shopping for 4 hours over the weekend and both her knees were sore after  She used ice to reduce symptoms and reports her knees felt fine the next day  Going down stairs is difficult for her and feels weak in both knees  Objective: See treatment diary below      Assessment:   Stage: subacute   Stability of Symptoms:  At Evaluation: stable - unchanged  Global Rating of Change:   Symptom Irritability Level:   Primary Movement Diagnosis:   Patient Specific Functional Scale:   23: walk over 1 mile without AD 0/10, perform daily housework without restrictions 0/10, return to going shopping and going to [employer] 0/10; Total 030  23: walk over 1 mile without AD 5/10, perform daily housework without restrictions 5/10, return to going shopping and going to [employer] 8/10; Total 18/30  FOTO Prediction: 75 by visit 15  FOTO Progress: 49 at evaluation   Greatest Concern: pain is very intense - limited functionally with daily tasks  Current Activity Plan: added to HEP ()  Current Educational Needs: progressions     Patient tolerated treatment well  Increased weight with SLR and hip ABD to improve LE strength - noted mild fatigue but no discomfort  Had difficulty with SL 5 way reach due to increased fatigue and required moderate HR support  Able to complete FWD/LAT step ups and LAT heel taps with no increase in pain - reports soreness post treatment  Withheld lunges due to increased fatigue and soreness   Skilled PT continues to be required to guide progression of strength and mobility to "allow her to perform shopping and housework without restrictions  Plan: Continue with POC - monitor tolerance to treatment - progress SL strength and control       Daily Treatment Diary     DX: (L) TKA  EPOC: 5/18/23  Follow Up with Referring Provider: 2/21/23  Precautions: NA  CO-MORBIDITIES: Asthma, Lupus  HEP ACCESS CODE: [id number]        Treatment Diary  3/30 4/3 4/11 4/17 4/26 5/1    Manual Therapy          (L) Knee PROM Seated  Flex  6 min Seated Flex  8 min CF Seated Flex  8 min Seated flex   8 min Seated flex   8 min    (L) Knee STM 2 min                                                           Therapeutic Exercise  HEP          Recumbent Bike  AROM  6 min AROM  6 min AROM 6 min  AROM  6 min AROM 6 min AROM 6 min    Ankle Pump 2/16          Supine Quad Set 2/16          Supine Heel Slide 2/16          Seated Quad Set 2/16          Seated Heel Slide 2/16          Long Sit Calf Stretch 2/16                                SLR  1# 20x ea 1# 20x ea 1# 20x   1# 20x ea 1 5# 20x ea    H/L TB Hip ABD           S/L Hip ABD  1# 20x ea 1# 20x ea 1# 20x   1# 20x ea 1 5# 20x ea    Bridge  5\"x20    5\"x20 5\" x20    PHE   0# 10x ea 1# 10x        LAQ                      Neuromuscular Reeducation           SL Balance  With Reach  5 Way  3x ea With Reach  5 Way  5x ea   With Reach  5 Way  5x ea  With reach 5 way 5x ea With reach 5 way 3x ea               Standing Hip Flex           Standing Hip ABD           Standing Hip Ext           Standing HS Curl           TB TKE                      FWD Wt Shift           LAT Wt Shift           FWD Lunge  15x ea 15x ea 15x ea        LAT Lunge  15x ea 15xea 15x ea                   Mini Squat  20x 20x 20x   20x 20x               FWD Step Up  Up and Over  6\" Step  20x ea Up and Over  8\" Step  20x ea Up and Over  8\" Step  20x ea  Up and over 8\" step 20x ea Up and over 8\" step 20x ea    LAT Step Up  Up and Over  6\" Step  20x ea Up and Over  8\" Step  20x ea Up and Over  8\" Step  20x " "ea  Up and over 8\" step 20x ea Up and over 8\" step 20x ea    LAT Heel Tap        4\" step 10xea    Therapeutic Activity                                 Gait Training                                            Modalities                                             "

## 2023-05-04 ENCOUNTER — OFFICE VISIT (OUTPATIENT)
Dept: PHYSICAL THERAPY | Facility: CLINIC | Age: 58
End: 2023-05-04

## 2023-05-04 DIAGNOSIS — M17.12 PRIMARY OSTEOARTHRITIS OF LEFT KNEE: ICD-10-CM

## 2023-05-04 DIAGNOSIS — Z96.652 STATUS POST TOTAL LEFT KNEE REPLACEMENT: Primary | ICD-10-CM

## 2023-05-04 NOTE — PROGRESS NOTES
Daily Note     Today's date: 2023  Patient name: Consuelo Nava  : 1965  MRN: [id number]  Referring provider: Katlin Bai PA-C  Dx:   Encounter Diagnosis     ICD-10-CM    1  Status post total left knee replacement  Z96 652       2  Primary osteoarthritis of left knee  M17 12                      Subjective: Patient reports good tolerance to her LV  She reports her knee is feeling better overall  She is able to go up and down the steps with less restrictions  Objective: See treatment diary below      Assessment:   Stage: subacute   Stability of Symptoms:  At Evaluation: stable - unchanged  Global Rating of Change:   Symptom Irritability Level:   Primary Movement Diagnosis:   Patient Specific Functional Scale:   23: walk over 1 mile without AD 0/10, perform daily housework without restrictions 0/10, return to going shopping and going to [employer] 0/10; Total 23: walk over 1 mile without AD 5/10, perform daily housework without restrictions 5/10, return to going shopping and going to [employer] 810; Total   FOTO Prediction: 75 by visit 15  FOTO Progress: 49 at evaluation   Greatest Concern: pain is very intense - limited functionally with daily tasks  Current Activity Plan: added to HEP ()  Current Educational Needs: progressions     Patient had good tolerance to manual stretching  She continues to show improvements with strength and endurance  She had no complaints of increased pain with exercises  Skilled PT continues to be required to guide progression of strength and mobility to allow her to perform shopping and housework without restrictions  Plan: Continue with POC - monitor tolerance to treatment - progress SL strength and control       Daily Treatment Diary     DX: (L) TKA  EPOC: 23  Follow Up with Referring Provider: 23  Precautions: NA  CO-MORBIDITIES: Asthma, Lupus  HEP ACCESS CODE: [id number]        Treatment Diary   "  Manual Therapy         (L) Knee PROM Seated Flex  8 min Seated flex   8 min Seated flex   8 min Seated  Flex  8 min     (L) Knee STM                                                      Therapeutic Exercise  HEP         Recumbent Bike  AROM  6 min AROM 6 min AROM 6 min AROM  6 min     Ankle Pump 2/16         Supine Quad Set 2/16         Supine Heel Slide 2/16         Seated Quad Set 2/16         Seated Heel Slide 2/16         Long Sit Calf Stretch 2/16                             SLR   1# 20x ea 1 5# 20x ea 1 5# 20x ea     H/L TB Hip ABD          S/L Hip ABD   1# 20x ea 1 5# 20x ea 1 5# 20x ea     Bridge   5\"x20 5\" x20 5\"x20     PHE          LAQ                    Neuromuscular Reeducation          SL Balance   With reach 5 way 5x ea With reach 5 way 3x ea                Standing Hip Flex          Standing Hip ABD          Standing Hip Ext          Standing HS Curl          TB TKE                    FWD Wt Shift          LAT Wt Shift          FWD Lunge          LAT Lunge                    Mini Squat   20x 20x 20x               FWD Step Up   Up and over 8\" step 20x ea Up and over 8\" step 20x ea Up and over 8\" step 20x ea     LAT Step Up   Up and over 8\" step 20x ea Up and over 8\" step 20x ea Up and over 8\" step 20x ea     LAT Heel Tap     4\" step 10xea      Therapeutic Activity                              Gait Training                                        Modalities                                          "

## 2023-05-09 ENCOUNTER — OFFICE VISIT (OUTPATIENT)
Dept: PHYSICAL THERAPY | Facility: CLINIC | Age: 58
End: 2023-05-09

## 2023-05-09 DIAGNOSIS — Z96.652 STATUS POST TOTAL LEFT KNEE REPLACEMENT: Primary | ICD-10-CM

## 2023-05-09 NOTE — PROGRESS NOTES
Daily Note     Today's date: 2023  Patient name: Radha Hansen  : 1965  MRN: [id number]  Referring provider: Faheem King PA-C  Dx:   Encounter Diagnosis     ICD-10-CM    1  Status post total left knee replacement  Z96 652                      Subjective: Patient reports no adverse reaction to her LV  She notes her (R) knee is limiting her more at this time compared to (L)  She reports no issues with HEP  Objective: See treatment diary below      Assessment:   Stage: subacute   Stability of Symptoms:  At Evaluation: stable - unchanged  Global Rating of Change:   Symptom Irritability Level:   Primary Movement Diagnosis:   Patient Specific Functional Scale:   23: walk over 1 mile without AD 0/10, perform daily housework without restrictions 0/10, return to going shopping and going to [employer] 0/10; Total 23: walk over 1 mile without AD 5/10, perform daily housework without restrictions 5/10, return to going shopping and going to [employer] 8/10; Total   FOTO Prediction: 75 by visit 15  FOTO Progress: 49 at evaluation   Greatest Concern: pain is very intense - limited functionally with daily tasks  Current Activity Plan: added to HEP ()  Current Educational Needs: progressions     Patient continues to progress well with exercises  She had good form and control with step ups  She is progressing very well with PT  Skilled PT continues to be required to guide progression of strength and mobility to allow her to perform shopping and housework without restrictions  Plan: Continue with POC - monitor tolerance to treatment - progress SL strength and control       Daily Treatment Diary     DX: (L) TKA  EPOC: 23  Follow Up with Referring Provider: 23  Precautions: NA  CO-MORBIDITIES: Asthma, Lupus  HEP ACCESS CODE: [id number]        Treatment Diary       Manual Therapy         (L) Knee PROM Seated flex   8 min Seated flex   8 min Seated  Flex  8 "min Seated  Flex  8 min     (L) Knee STM                                                      Therapeutic Exercise  HEP         Recumbent Bike  AROM 6 min AROM 6 min AROM  6 min AROM  6 min     Ankle Pump 2/16         Supine Quad Set 2/16         Supine Heel Slide 2/16         Seated Quad Set 2/16         Seated Heel Slide 2/16         Long Sit Calf Stretch 2/16                             SLR  1# 20x ea 1 5# 20x ea 1 5# 20x ea 2# 20x ea     H/L TB Hip ABD          S/L Hip ABD  1# 20x ea 1 5# 20x ea 1 5# 20x ea 2# 20x ea     Bridge  5\"x20 5\" x20 5\"x20 5\"x20     PHE          LAQ                    Neuromuscular Reeducation          SL Balance  With reach 5 way 5x ea With reach 5 way 3x ea                 Standing Hip Flex          Standing Hip ABD          Standing Hip Ext          Standing HS Curl          TB TKE                    FWD Wt Shift          LAT Wt Shift          FWD Lunge          LAT Lunge                    Mini Squat  20x 20x 20x 20x               FWD Step Up  Up and over 8\" step 20x ea Up and over 8\" step 20x ea Up and over 8\" step 20x ea Up and over 8\" step 20x ea     LAT Step Up  Up and over 8\" step 20x ea Up and over 8\" step 20x ea Up and over 8\" step 20x ea Up and over 8\" step 20x ea     LAT Heel Tap    4\" step 10xea       Therapeutic Activity                              Gait Training                                        Modalities                                          "

## 2023-05-11 ENCOUNTER — APPOINTMENT (OUTPATIENT)
Dept: PHYSICAL THERAPY | Facility: CLINIC | Age: 58
End: 2023-05-11
Payer: COMMERCIAL

## 2023-05-16 ENCOUNTER — OFFICE VISIT (OUTPATIENT)
Dept: PHYSICAL THERAPY | Facility: CLINIC | Age: 58
End: 2023-05-16

## 2023-05-16 DIAGNOSIS — M17.12 PRIMARY OSTEOARTHRITIS OF LEFT KNEE: Primary | ICD-10-CM

## 2023-05-16 DIAGNOSIS — Z96.652 STATUS POST TOTAL LEFT KNEE REPLACEMENT: ICD-10-CM

## 2023-05-16 NOTE — PROGRESS NOTES
Daily Note     Today's date: 2023  Patient name: Jones Burkett  : 1965  MRN: [id number]  Referring provider: Tara Noonan PA-C  Dx:   Encounter Diagnosis     ICD-10-CM    1  Primary osteoarthritis of left knee  M17 12       2  Status post total left knee replacement  Z96 652                      Subjective: Patient states she is doing well  Denies pain arriving to PT today  Objective: See treatment diary below      Assessment:   Stage: subacute   Stability of Symptoms:  At Evaluation: stable - unchanged  Global Rating of Change:   Symptom Irritability Level:   Primary Movement Diagnosis:   Patient Specific Functional Scale:   23: walk over 1 mile without AD 0/10, perform daily housework without restrictions 0/10, return to going shopping and going to [employer] 0/10; Total 23: walk over 1 mile without AD 5/10, perform daily housework without restrictions 5/10, return to going shopping and going to [employer] 8/10; Total   FOTO Prediction: 75 by visit 15  FOTO Progress: 49 at evaluation   Greatest Concern: pain is very intense - limited functionally with daily tasks  Current Activity Plan: added to HEP ()  Current Educational Needs: progressions     Initiated POC on RB for active warmup  She demonstrated good form throughout session requiring minimal VCs  Continued with POC  Consider progressing TE next visit  Plan: Continue with POC - monitor tolerance to treatment - progress SL strength and control       Daily Treatment Diary     DX: (L) TKA  EPOC: 23  Follow Up with Referring Provider: 23  Precautions: NA  CO-MORBIDITIES: Asthma, Lupus  HEP ACCESS CODE: [id number]        Treatment Diary      Manual Therapy         (L) Knee PROM Seated flex   8 min Seated flex   8 min Seated  Flex  8 min Seated  Flex  8 min Seated flex 8 min    (L) Knee STM                                                      Therapeutic Exercise  HEP         Recumbent "Bike  AROM 6 min AROM 6 min AROM  6 min AROM  6 min AROM  6 min    Ankle Pump 2/16         Supine Quad Set 2/16         Supine Heel Slide 2/16         Seated Quad Set 2/16         Seated Heel Slide 2/16         Long Sit Calf Stretch 2/16                             SLR  1# 20x ea 1 5# 20x ea 1 5# 20x ea 2# 20x ea 2# 20x ea     H/L TB Hip ABD          S/L Hip ABD  1# 20x ea 1 5# 20x ea 1 5# 20x ea 2# 20x ea 2# 20x ea    Bridge  5\"x20 5\" x20 5\"x20 5\"x20 5\" x 20     PHE          LAQ                    Neuromuscular Reeducation          SL Balance  With reach 5 way 5x ea With reach 5 way 3x ea                 Standing Hip Flex          Standing Hip ABD          Standing Hip Ext          Standing HS Curl          TB TKE                    FWD Wt Shift          LAT Wt Shift          FWD Lunge          LAT Lunge                    Mini Squat  20x 20x 20x 20x 20x               FWD Step Up  Up and over 8\" step 20x ea Up and over 8\" step 20x ea Up and over 8\" step 20x ea Up and over 8\" step 20x ea Up and over 8\" step 20x ea    LAT Step Up  Up and over 8\" step 20x ea Up and over 8\" step 20x ea Up and over 8\" step 20x ea Up and over 8\" step 20x ea Up and over 8\" step 20x ea    LAT Heel Tap    4\" step 10xea       Therapeutic Activity                              Gait Training                                        Modalities                                          "

## 2023-05-18 ENCOUNTER — APPOINTMENT (OUTPATIENT)
Dept: PHYSICAL THERAPY | Facility: CLINIC | Age: 58
End: 2023-05-18
Payer: COMMERCIAL

## 2023-05-23 ENCOUNTER — EVALUATION (OUTPATIENT)
Dept: PHYSICAL THERAPY | Facility: CLINIC | Age: 58
End: 2023-05-23

## 2023-05-23 ENCOUNTER — APPOINTMENT (OUTPATIENT)
Dept: RADIOLOGY | Facility: CLINIC | Age: 58
End: 2023-05-23

## 2023-05-23 ENCOUNTER — OFFICE VISIT (OUTPATIENT)
Dept: OBGYN CLINIC | Facility: CLINIC | Age: 58
End: 2023-05-23

## 2023-05-23 VITALS
BODY MASS INDEX: 31.41 KG/M2 | HEIGHT: 60 IN | DIASTOLIC BLOOD PRESSURE: 60 MMHG | SYSTOLIC BLOOD PRESSURE: 90 MMHG | WEIGHT: 160 LBS

## 2023-05-23 DIAGNOSIS — Z96.652 AFTERCARE FOLLOWING LEFT KNEE JOINT REPLACEMENT SURGERY: ICD-10-CM

## 2023-05-23 DIAGNOSIS — Z96.652 STATUS POST TOTAL LEFT KNEE REPLACEMENT: ICD-10-CM

## 2023-05-23 DIAGNOSIS — M17.12 PRIMARY OSTEOARTHRITIS OF LEFT KNEE: Primary | ICD-10-CM

## 2023-05-23 DIAGNOSIS — Z47.1 AFTERCARE FOLLOWING LEFT KNEE JOINT REPLACEMENT SURGERY: ICD-10-CM

## 2023-05-23 DIAGNOSIS — Z96.652 AFTERCARE FOLLOWING LEFT KNEE JOINT REPLACEMENT SURGERY: Primary | ICD-10-CM

## 2023-05-23 DIAGNOSIS — Z47.1 AFTERCARE FOLLOWING LEFT KNEE JOINT REPLACEMENT SURGERY: Primary | ICD-10-CM

## 2023-05-23 RX ORDER — CALCIUM CARBONATE/VITAMIN D3 600 MG-10
1 TABLET ORAL 2 TIMES DAILY
COMMUNITY
Start: 2023-04-20

## 2023-05-23 NOTE — PROGRESS NOTES
Assessment:     1  Aftercare following left knee joint replacement surgery        Plan:     Problem List Items Addressed This Visit        Other    Aftercare following left knee joint replacement surgery - Primary     S/p left total knee replacement 2/6/23 overall doing well  Imaging and prognosis reviewed with patient  She will continue with physical therapy until discharged to [facility]  She can gradually increase her activities to tolerance  Quad strength will continue to improve up to year jason  Call for antibiotics prior to dental procedures  See patient back in at year anniversary with imaging  All patient's questions were answered to her satisfaction  This note is created using dictation transcription  It may contain typographical errors, grammatical errors, improperly dictated words, background noise and other errors  Relevant Orders    XR knee 3 vw left non injury       Subjective:     Patient ID: Cristino Ganser is a 56 y o  female  Chief Complaint: This is a 71-year-old female who is status post left total knee arthroplasty on February 6, 2023  She is attending physical therapy with benefit  Maintaining HEP  She states pain is much improved following surgery but still has some discomfort  Going down stairs bothers her the most at this point  Has some numbness lateral aspect of incision  Denies any fever,chills  Allergy:  No Known Allergies  Medications:  all current active meds have been reviewed  Past Medical History:  Past Medical History:   Diagnosis Date   • Asthma    • Lupus ([address] )      Past Surgical History:  Past Surgical History:   Procedure Laterality Date   • FOOT SURGERY Right     Talonavicular arthrodesis with calcaneal osteotomy   • MD ARTHRP KNE CONDYLE&PLATU MEDIAL&LAT COMPARTMENTS Left 2/6/2023    Procedure: ARTHROPLASTY KNEE TOTAL;  Surgeon: Martha Siddiqui MD;  Location:  MAIN OR;  Service: Orthopedics     Family History:  History reviewed  No pertinent family history    Social History:  Social History     Substance and Sexual Activity   Alcohol Use Not Currently     Social History     Substance and Sexual Activity   Drug Use Never     Social History     Tobacco Use   Smoking Status Some Days   • Packs/day: 0 25   • Years: 20 00   • Pack years: 5 00   • Types: Cigarettes   Smokeless Tobacco Never     Review of Systems   Constitutional: Negative for chills and fever  HENT: Negative for ear pain and sore throat  Eyes: Negative for pain and visual disturbance  Respiratory: Negative for cough and shortness of breath  Cardiovascular: Negative for chest pain and palpitations  Gastrointestinal: Negative for abdominal pain and vomiting  Genitourinary: Negative for dysuria and hematuria  Musculoskeletal: Positive for arthralgias (left knee)  Negative for back pain  Skin: Negative for color change and rash  Neurological: Negative for seizures and syncope  All other systems reviewed and are negative  Objective:  BP Readings from Last 1 Encounters:   05/23/23 90/60      Wt Readings from Last 1 Encounters:   05/23/23 72 6 kg (160 lb)      BMI:   Estimated body mass index is 31 25 kg/m² as calculated from the following:    Height as of this encounter: 5' (1 524 m)  Weight as of this encounter: 72 6 kg (160 lb)  BSA:   Estimated body surface area is 1 7 meters squared as calculated from the following:    Height as of this encounter: 5' (1 524 m)  Weight as of this encounter: 72 6 kg (160 lb)  Physical Exam  Constitutional:       Appearance: She is well-developed  Eyes:      Pupils: Pupils are equal, round, and reactive to light  Pulmonary:      Effort: Pulmonary effort is normal       Breath sounds: Normal breath sounds  Musculoskeletal:         General: Tenderness (left knee arthralgia) present  Left knee: No effusion  Skin:     General: Skin is warm and dry  Neurological:      Mental Status: She is alert and oriented to person, place, and time  Deep Tendon Reflexes: Reflexes are normal and symmetric  Psychiatric:         Behavior: Behavior normal          Thought Content: Thought content normal          Judgment: Judgment normal        Left Knee Exam     Tenderness   Left knee tenderness location: general muscle soreness     Range of Motion   Extension: 0   Flexion: 120     Tests   Varus: negative Valgus: negative    Other   Erythema: absent  Scars: present (well healed incision )  Sensation: normal  Pulse: present  Swelling: none  Effusion: no effusion present    Comments: Well tracking patella             I have personally reviewed pertinent films in PACS and my interpretation is xr left knee demonstrates stable total knee prosthesis with good alignment       Scribe Attestation    I,:  Kem Arce am acting as a scribe while in the presence of the attending physician :       I,:  Katya Sprague MD personally performed the services described in this documentation    as scribed in my presence :

## 2023-05-23 NOTE — PROGRESS NOTES
PT Re-Evaluation     Today's date: 2023  Patient name: Samuel Alegria  : 1965  MRN: [id number]  Referring provider: Karely Ford PA-C  Dx:   Encounter Diagnosis     ICD-10-CM    1  Primary osteoarthritis of left knee  M17 12       2  Status post total left knee replacement  [id number]                      Assessment  Assessment details: Patient has attended 21 PT treatment sessions from 23 to 23  Since initial evaluation patient displays with objective improvements with pain levels, strength, ROM, and function  Patient has achieved a 6 point increase on the Global Rating of Change scale  Patient demonstrates a 30 point improvement on the Patient Specific Functional Scale and a 28 point improvement on FOTO indicating excellent progress towards her goals  At this time it is recommended that she continue with an I HEP  She is to contact me if she has return of symptoms or any questions/ concerns  Please contact me if you have any questions ([phone])  Thank you for the opportunity to share in the care of this patient       Impairments: abnormal gait, abnormal or restricted ROM, activity intolerance, impaired balance, impaired physical strength, lacks appropriate home exercise program and pain with function    Symptom irritability: moderateUnderstanding of Dx/Px/POC: good   Prognosis: good  Prognosis details: Positive prognostic indicators include positive attitude toward recovery, motivated to improve, good understanding of condition, realistic expectations  Negative prognostic indicators include chronicity of symptoms prior to surgery  Goals  STG to be met in 4 weeks:  - Increase (L) Knee AROM: 0-100 degrees  - met  - Increase (L) Hip and Knee strength by 1/2 MMT grade  - met  - Improve SL balance to 15 seconds  - met  - I with HEP  - met  - Patient will be able to ambulate without AD  - met  LTG to be met in 8 weeks:  - Increase (L) Knee AROM: 0-115 degrees   - met  - Increase (L) Hip and Knee strength to 4+/5 all planes  - met  - Improve SL balance to 30 seconds  - I with updated HEP   - Patient will be able to return to performing housework and shopping without increased pain  - met      Plan  Plan details: DC to I HEP    Patient would benefit from: skilled physical therapy  Planned modality interventions: cryotherapy and thermotherapy: hydrocollator packs  Planned therapy interventions: joint mobilization, manual therapy, neuromuscular re-education, patient education, strengthening, stretching, therapeutic activities, therapeutic exercise, home exercise program, body mechanics training, activity modification, functional ROM exercises, gait training and balance  Plan of Care beginning date: 5/23/2023  Plan of Care expiration date: 5/23/2023  Treatment plan discussed with: patient        Subjective Evaluation    History of Present Illness  Date of surgery: 2/6/2023  Mechanism of injury: surgery  Mechanism of injury: At Evaluation (February 16, 2023): Patient reports chronic history of (L) knee pain for over 3 years  Over the years she has managed her symptoms with activity modification, PT, and injections  She underwent (L) TKA on 2/6/23 - went home on 2/9/23  Red Flag Screen:  Patient denies recent fever, changes in bowel and bladder function, nausea and vomiting, weakness, unexplained weight loss, pain with coughing, or traumatic STANLEY   Patient reports numbness throughout the (L) knee  Nina Maximo' Criteria for DVT Score = +1    Greatest concern: pain is very intense - limited functionally with daily tasks    4/17/23: Patient reports since starting PT she sees significant improvement in pain levels (frequency and intensity), strength, ROM, and function  Global Rating of Change: +5   She has been walking more without AD but has fatigue and some soreness after walking longer distances  Housework is going well but she requires some breaks from standing for extended time    She has been performing her HEP regularly  (23): Patient reports since her last reassessment she sees significant improvements in pain levels, strength, ROM, and function  Global Rating of Change: +6   She has met her functional goals - notes slight difficulty descending steps at times but is improving each week  She is I with her HEP  Quality of life: good    Pain  Current pain ratin  At best pain ratin  At worst pain rating: 3  Location: (L) Knee  Quality: dull ache    Social Support  Steps to enter house: yes  Stairs in house: yes   Lives in: multiple-level home  Lives with: Family     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 )    Diagnostic Tests  X-ray: abnormal  Treatments  Previous treatment: injection treatment, medication and physical therapy  Patient Goals  Patient goal: Patient Specific Functional Scale: walk over 1 mile without AD 10/10, perform daily housework without restrictions 10/10, return to going shopping and going to [employer] 10/10; Total 30/        Objective     Static Posture   General Observations  Symmetrical weight bearing  Knee   Knee (Left): Not flexed       Palpation     Additional Palpation Details  TTP throughout (L) knee    Active Range of Motion   Left Knee   Flexion: 120 degrees   Extension: 0 degrees     Right Knee   Flexion: 130 degrees   Extension: -2 degrees     Strength/Myotome Testing     Left Hip   Planes of Motion   Extension: 4-  Abduction: 4    Right Hip   Planes of Motion   Extension: 4  Abduction: 4    Left Knee   Flexion: 4  Extension: 4+    Right Knee   Flexion: 4+  Extension: 4+    Tests     Additional Tests Details  Additional special testing not performed secondary to post op status      Ambulation     Observational Gait   Gait: within functional limits     Functional Assessment        Comments  TU 1 seconds             Daily Treatment Diary     DX: (L) TKA  EPOC: 23  Follow Up with Referring Provider: 23  Precautions: "NA  CO-MORBIDITIES: Asthma, Lupus  HEP ACCESS CODE: [id number]    Stage: subacute   Stability of Symptoms:  At Evaluation: stable - unchanged  Global Rating of Change:   Symptom Irritability Level:   Primary Movement Diagnosis:   Patient Specific Functional Scale:   2/16/23: walk over 1 mile without AD 0/10, perform daily housework without restrictions 0/10, return to going shopping and going to [employer] 0/10; Total 0/30 4/17/23: walk over 1 mile without AD 5/10, perform daily housework without restrictions 5/10, return to going shopping and going to [employer] 8/10; Total 18/30 5/23/23: walk over 1 mile without AD 10/10, perform daily housework without restrictions 10/10, return to going shopping and going to [employer] 10/10;  Total 30/30  FOTO Prediction: 75 by visit 15  FOTO Progress: 49 at evaluation   Greatest Concern: pain is very intense - limited functionally with daily tasks  Current Activity Plan: added to HEP (2/16)  Current Educational Needs: progressions       Treatment Diary  5/1 5/4 5/9 5/16 5/23    Manual Therapy         (L) Knee PROM Seated flex   8 min Seated  Flex  8 min Seated  Flex  8 min Seated flex   8 min     (L) Knee STM                                                      Therapeutic Exercise  HEP         Recumbent Bike  AROM 6 min AROM  6 min AROM  6 min AROM  6 min AROM  6 min    Ankle Pump 2/16         Supine Quad Set 2/16         Supine Heel Slide 2/16         Seated Quad Set 2/16         Seated Heel Slide 2/16         Long Sit Calf Stretch 2/16                             SLR  1 5# 20x ea 1 5# 20x ea 2# 20x ea 2# 20x ea  2# 20x ea    H/L TB Hip ABD          S/L Hip ABD  1 5# 20x ea 1 5# 20x ea 2# 20x ea 2# 20x ea 2# 20x ea    Bridge  5\" x20 5\"x20 5\"x20 5\" x 20  5\"x20    PHE          LAQ                    Neuromuscular Reeducation          SL Balance  With reach 5 way 3x ea                  Standing Hip Flex          Standing Hip ABD          Standing Hip Ext          Standing HS Curl        " "  TB TKE                    FWD Wt Shift          LAT Wt Shift          FWD Lunge      15x ea    LAT Lunge      15x ea              Mini Squat  20x 20x 20x 20x  20x              FWD Step Up  Up and over 8\" step 20x ea Up and over 8\" step 20x ea Up and over 8\" step 20x ea Up and over 8\" step 20x ea Up and over 8\" step 20x ea    LAT Step Up  Up and over 8\" step 20x ea Up and over 8\" step 20x ea Up and over 8\" step 20x ea Up and over 8\" step 20x ea Up and over 8\" step 20x ea    LAT Heel Tap   4\" step 10xea        Therapeutic Activity                              Gait Training                                        Modalities                                        "

## 2023-05-23 NOTE — ASSESSMENT & PLAN NOTE
S/p left total knee replacement 2/6/23 overall doing well  Imaging and prognosis reviewed with patient  She will continue with physical therapy until discharged to [facility]  She can gradually increase her activities to tolerance  Quad strength will continue to improve up to year jason  Call for antibiotics prior to dental procedures  See patient back in at year anniversary with imaging  All patient's questions were answered to her satisfaction  This note is created using dictation transcription  It may contain typographical errors, grammatical errors, improperly dictated words, background noise and other errors

## 2023-07-19 DIAGNOSIS — M17.11 PRIMARY OSTEOARTHRITIS OF RIGHT KNEE: ICD-10-CM

## 2023-07-19 DIAGNOSIS — M17.12 PRIMARY OSTEOARTHRITIS OF LEFT KNEE: ICD-10-CM

## 2023-07-19 DIAGNOSIS — Z01.818 PREOP TESTING: ICD-10-CM

## 2023-07-20 RX ORDER — NAPROXEN 500 MG/1
TABLET ORAL
Qty: 60 TABLET | Refills: 0 | OUTPATIENT
Start: 2023-07-20

## 2023-07-20 RX ORDER — LORATADINE 10 MG
TABLET ORAL
Qty: 60 TABLET | Refills: 2 | OUTPATIENT
Start: 2023-07-20

## 2023-07-24 ENCOUNTER — OFFICE VISIT (OUTPATIENT)
Dept: PODIATRY | Facility: CLINIC | Age: 58
End: 2023-07-24
Payer: COMMERCIAL

## 2023-07-24 VITALS
HEART RATE: 60 BPM | WEIGHT: 160 LBS | DIASTOLIC BLOOD PRESSURE: 77 MMHG | SYSTOLIC BLOOD PRESSURE: 116 MMHG | HEIGHT: 60 IN | BODY MASS INDEX: 31.41 KG/M2

## 2023-07-24 DIAGNOSIS — S86.112A RUPTURE OF POSTERIOR TIBIALIS TENDON, LEFT, INITIAL ENCOUNTER: Primary | ICD-10-CM

## 2023-07-24 DIAGNOSIS — M21.42 PES PLANUS OF LEFT FOOT: ICD-10-CM

## 2023-07-24 DIAGNOSIS — M79.672 PAIN IN LEFT FOOT: ICD-10-CM

## 2023-07-24 PROCEDURE — 99214 OFFICE O/P EST MOD 30 MIN: CPT | Performed by: PODIATRIST

## 2023-07-24 RX ORDER — CHLORHEXIDINE GLUCONATE 0.12 MG/ML
15 RINSE ORAL ONCE
OUTPATIENT
Start: 2023-07-24 | End: 2023-07-24

## 2023-07-24 RX ORDER — ACETAMINOPHEN 325 MG/1
975 TABLET ORAL ONCE
OUTPATIENT
Start: 2023-07-24 | End: 2023-07-24

## 2023-07-24 RX ORDER — CHLORHEXIDINE GLUCONATE 4 G/100ML
SOLUTION TOPICAL DAILY PRN
OUTPATIENT
Start: 2023-07-24

## 2023-07-24 NOTE — PROGRESS NOTES
Assessment/Plan:  Consent reviewed in detail with patient and signed. All risk benefits alternatives and possible complications discussed in detail with regards to the planned surgical procedures. Please accept this note as podiatry preop H&P. Thanks MRI left foot to obtain a more current accurate evaluation of the tendon structure of the left foot. This is needed for surgical planning with regards to arthrodesis of the TN joint or potentially needing a subtalar joint arthrodesis in addition. Plan for outpatient surgery at [address] [place] [address] on [facility] 9/13/2023 under general anesthesia. Will obtain preop H&P from primary care    No problem-specific Assessment & Plan notes found for this encounter. Diagnoses and all orders for this visit:    Rupture of posterior tibialis tendon, left, initial encounter  -     MRI ankle/heel left wo contrast; Future  -     Case request operating room: ARTHRODESIS / FUSION FOOT - LEFT TALO_NAV JOINT, REPAIR POSTERIOR TIBIAL TENDON FOOT; Standing  -     Ambulatory referral to [facility]; Future  -     Basic metabolic panel; Future  -     CBC and differential; Future  -     Basic metabolic panel  -     CBC and differential  -     Case request operating room: ARTHRODESIS / FUSION FOOT - LEFT TALO_NAV JOINT, REPAIR POSTERIOR TIBIAL TENDON FOOT    Pes planus of left foot  -     Case request operating room: ARTHRODESIS / FUSION FOOT - LEFT TALO_NAV JOINT, REPAIR POSTERIOR TIBIAL TENDON FOOT; Standing     Pain in left foot      Other orders:  -     Notify physician; Standing  -     Diet NPO; Sips with meds; Standing  -     Height and weight upon arrival; Standing  -     Nursing Communication [facility] Interventions Implemented; Standing  -     Nursing Communication [facility] bath, have staff wash entire body (neck down) per pre-op bathing protocol.  Routine, evening prior to, and day of surgery.; Standing  -     Nursing Communication Swab both nares with Povidone-Iodine solution, EXCLUDE if patient has shellfish/Iodine allergy. Routine, day of surgery, on call to OR; Standing  -     chlorhexidine (PERIDEX) 0.12 % oral rinse 15 mL  -     Void on call to OR; Standing  -     Insert peripheral IV; Standing  -     ceFAZolin (ANCEF) 1,000 mg in dextrose 5 % 100 mL IVPB  -     acetaminophen (TYLENOL) tablet 975 mg  -     chlorhexidine (HIBICLENS) 4 % topical liquid          Subjective:      Patient ID: Carlos Traore is a 56 y.o. female. 7/24/2023: 62-year-old female presents with continued pain/discomfort which has been increasing in her left rear foot. Recently completed her left knee surgery and now would like to have something done with her left ankle which seems to have more deformity now than previously noted. Patient reports pain daily that limits her walking. She had a similar procedure that was done on her right foot several years ago to address the same issue. 1/31/2023: 62-year-old female presents with MRI disc/report for left rear foot done in [place], documents rupture of left posterior tibial tendon where she has most of her pain/discomfort in her left foot. Secondary concern of dropping a glass window on top of both feet at the same time 3 weeks ago which has resulted in some pain on the top of both feet. Reports also having surgery on her right foot for a tendon rupture in the past and has some retained hardware. Patient is scheduled to have a total knee replacement on the left side within the next 4 to 6 weeks with Dr. Brigette Belle. The following portions of the patient's history were reviewed and updated as appropriate: allergies, current medications, past family history, past medical history, past social history, past surgical history and problem list.    Review of Systems   Constitutional: Negative. HENT: Negative. Eyes: Negative. Respiratory: Negative. Cardiovascular: Negative. Gastrointestinal: Negative. Endocrine: Negative. Genitourinary: Negative. Musculoskeletal: Positive for arthralgias. Pain with palpation medial left ankle along posterior tibial tendon. Notable pes planus valgus deformity. Skin: Negative. Allergic/Immunologic: Negative. Neurological: Negative. Hematological: Negative. Psychiatric/Behavioral: Negative. Objective:      /77   Pulse 60   Ht 5' (1.524 m)   Wt 72.6 kg (160 lb)   BMI 31.25 kg/m²          Physical Exam  Constitutional:       Appearance: Normal appearance. HENT:      Head: Normocephalic. Right Ear: External ear normal.      Left Ear: External ear normal.   Eyes:      Pupils: Pupils are equal, round, and reactive to light. Cardiovascular:      Rate and Rhythm: Normal rate. Pulses: Normal pulses. Pulmonary:      Effort: Pulmonary effort is normal.   Musculoskeletal:         General: Tenderness (Pain along posterior tibial tendon course from talonavicular joint to retromalleolar.) and deformity (Pes valgus planus deformity with collapse of medial longitudinal arch and forefoot abduction.) present. Cervical back: Normal range of motion. Left foot: Deformity present. Feet:      Right foot:      Protective Sensation: 10 sites tested. 10 sites sensed. Skin integrity: Skin integrity normal.      Left foot:      Protective Sensation: 10 sites tested. 10 sites sensed. Skin integrity: Skin integrity normal.   Skin:     General: Skin is warm and dry. Capillary Refill: Capillary refill takes 2 to 3 seconds. Neurological:      General: No focal deficit present. Mental Status: She is alert.    Psychiatric:         Mood and Affect: Mood normal.

## 2023-07-26 ENCOUNTER — PREP FOR PROCEDURE (OUTPATIENT)
Dept: OBGYN CLINIC | Facility: CLINIC | Age: 58
End: 2023-07-26

## 2023-08-01 ENCOUNTER — OFFICE VISIT (OUTPATIENT)
Dept: OBGYN CLINIC | Facility: CLINIC | Age: 58
End: 2023-08-01

## 2023-08-01 VITALS
DIASTOLIC BLOOD PRESSURE: 80 MMHG | SYSTOLIC BLOOD PRESSURE: 116 MMHG | WEIGHT: 158 LBS | HEIGHT: 60 IN | BODY MASS INDEX: 31.02 KG/M2

## 2023-08-01 DIAGNOSIS — M17.11 PRIMARY OSTEOARTHRITIS OF RIGHT KNEE: ICD-10-CM

## 2023-08-01 DIAGNOSIS — Z96.652 HISTORY OF TOTAL LEFT KNEE REPLACEMENT: ICD-10-CM

## 2023-08-01 DIAGNOSIS — M70.62 GREATER TROCHANTERIC BURSITIS OF LEFT HIP: Primary | ICD-10-CM

## 2023-08-01 PROBLEM — Z47.1 AFTERCARE FOLLOWING LEFT KNEE JOINT REPLACEMENT SURGERY: Status: RESOLVED | Noted: 2023-02-21 | Resolved: 2023-08-01

## 2023-08-01 PROBLEM — M70.61 GREATER TROCHANTERIC BURSITIS OF RIGHT HIP: Status: ACTIVE | Noted: 2023-08-01

## 2023-08-01 PROBLEM — M17.12 PRIMARY OSTEOARTHRITIS OF LEFT KNEE: Status: RESOLVED | Noted: 2023-01-11 | Resolved: 2023-08-01

## 2023-08-01 RX ORDER — BUPIVACAINE HYDROCHLORIDE 2.5 MG/ML
8 INJECTION, SOLUTION INFILTRATION; PERINEURAL
Status: COMPLETED | OUTPATIENT
Start: 2023-08-01 | End: 2023-08-01

## 2023-08-01 RX ORDER — NAPROXEN 500 MG/1
500 TABLET ORAL 2 TIMES DAILY WITH MEALS
Qty: 60 TABLET | Refills: 0 | Status: SHIPPED | OUTPATIENT
Start: 2023-08-01

## 2023-08-01 RX ORDER — BETAMETHASONE SODIUM PHOSPHATE AND BETAMETHASONE ACETATE 3; 3 MG/ML; MG/ML
6 INJECTION, SUSPENSION INTRA-ARTICULAR; INTRALESIONAL; INTRAMUSCULAR; SOFT TISSUE
Status: COMPLETED | OUTPATIENT
Start: 2023-08-01 | End: 2023-08-01

## 2023-08-01 RX ADMIN — BUPIVACAINE HYDROCHLORIDE 8 ML: 2.5 INJECTION, SOLUTION INFILTRATION; PERINEURAL at 11:15

## 2023-08-01 RX ADMIN — BETAMETHASONE SODIUM PHOSPHATE AND BETAMETHASONE ACETATE 6 MG: 3; 3 INJECTION, SUSPENSION INTRA-ARTICULAR; INTRALESIONAL; INTRAMUSCULAR; SOFT TISSUE at 11:15

## 2023-08-01 NOTE — PROGRESS NOTES
Assessment:     1. Greater trochanteric bursitis of left hip    2. History of total left knee replacement    3. Primary osteoarthritis of right knee        Plan:     Problem List Items Addressed This Visit        Musculoskeletal and Integument    Primary osteoarthritis of right knee    Relevant Medications    naproxen (NAPROSYN) 500 mg tablet    bupivacaine (MARCAINE) 0.25 % injection 8 mL (Completed)    betamethasone acetate-betamethasone sodium phosphate (CELESTONE) injection 6 mg (Completed)    Other Relevant Orders    Large joint arthrocentesis: R knee (Completed)    Greater trochanteric bursitis of left hip - Primary    Relevant Medications    bupivacaine (MARCAINE) 0.25 % injection 8 mL (Completed)    betamethasone acetate-betamethasone sodium phosphate (CELESTONE) injection 6 mg (Completed)    Other Relevant Orders    Ambulatory Referral to Physical Therapy    Large joint arthrocentesis: L greater trochanteric bursa (Completed)       Other    History of total left knee replacement       Findings today are consistent with status post left total knee arthroplasty performed on 02/06/2023, left greater trochanteric bursitis, and right knee osteoarthritis. Imaging and prognosis was reviewed with the patient today. Cortisone steroid injection was administered to the left hip greater trochanteric bursa and the right knee today. I will refer patient to physical therapy for left hip and knee treatment. Use over-the-counter NSAIDs. Patient should avoid strenuous activities for the next 1-2 days. Patient should avoid vaccines for the next 2 weeks if possible. Follow up in 3-4 months. All patient's questions were answered to her satisfaction. This note is created using dictation transcription. It may contain typographical errors, grammatical errors, improperly dictated words, background noise and other errors. Subjective:     Patient ID: Roxana Adames is a 56 y.o. female.   Chief Complaint:  56 y.o. female presents to the office for follow up status post left total knee arthroplasty performed on 02/06/2023. She has completed physical therapy. She is experiencing lateral left knee pain that goes up her thigh to her lateral hip. She describes her pain as cramping. She notes increased pain with stairs. She is also experiencing right knee pain. She is interested in discussing a right knee cortisone injection today. Allergy:  No Known Allergies  Medications:  all current active meds have been reviewed  Past Medical History:  Past Medical History:   Diagnosis Date   • Asthma    • Lupus ([address])    • Primary osteoarthritis of left knee 1/11/2023     Past Surgical History:  Past Surgical History:   Procedure Laterality Date   • FOOT SURGERY Right     Talonavicular arthrodesis with calcaneal osteotomy   • PA ARTHRP KNE CONDYLE&PLATU MEDIAL&LAT COMPARTMENTS Left 2/6/2023    Procedure: ARTHROPLASTY KNEE TOTAL;  Surgeon: Mitzi Lopez MD;  Location: [facility] OR;  Service: Orthopedics     Family History:  History reviewed. No pertinent family history. Social History:  Social History     Substance and Sexual Activity   Alcohol Use Not Currently     Social History     Substance and Sexual Activity   Drug Use Never     Social History     Tobacco Use   Smoking Status Some Days   • Packs/day: 0.25   • Years: 20.00   • Total pack years: 5.00   • Types: Cigarettes   Smokeless Tobacco Never     Review of Systems   Constitutional: Negative for chills and fever. HENT: Negative for drooling and sneezing. Eyes: Negative for redness. Respiratory: Negative for cough and wheezing. Gastrointestinal: Negative for nausea and vomiting. Psychiatric/Behavioral: Negative for behavioral problems. The patient is not nervous/anxious.           Objective:  BP Readings from Last 1 Encounters:   08/01/23 116/80      Wt Readings from Last 1 Encounters:   08/01/23 71.7 kg (158 lb)      BMI:   Estimated body mass index is 30.86 kg/m² as calculated from the following:    Height as of this encounter: 5' (1.524 m). Weight as of this encounter: 71.7 kg (158 lb). BSA:   Estimated body surface area is 1.69 meters squared as calculated from the following:    Height as of this encounter: 5' (1.524 m). Weight as of this encounter: 71.7 kg (158 lb). Physical Exam  Vitals and nursing note reviewed. Constitutional:       Appearance: Normal appearance. She is well-developed. HENT:      Head: Normocephalic and atraumatic. Right Ear: External ear normal.      Left Ear: External ear normal.   Eyes:      Extraocular Movements: Extraocular movements intact. Conjunctiva/sclera: Conjunctivae normal.   Neck:      Trachea: No tracheal deviation. Pulmonary:      Effort: Pulmonary effort is normal.   Musculoskeletal:      Cervical back: Neck supple. Left knee: No effusion. Skin:     General: Skin is warm and dry. Neurological:      Mental Status: She is alert and oriented to person, place, and time. Deep Tendon Reflexes: Reflexes are normal and symmetric. Psychiatric:         Mood and Affect: Mood normal.         Behavior: Behavior normal.       Left Knee Exam     Muscle Strength   The patient has normal left knee strength. Tenderness   Left knee tenderness location: IT band. Range of Motion   The patient has normal left knee ROM. Extension: 0   Flexion: 120     Tests   Varus: negative Valgus: negative  Patellar apprehension: negative    Other   Erythema: absent  Scars: present (Incision well-healed. No signs of infection)  Sensation: normal  Pulse: present  Swelling: none  Effusion: no effusion present      Left Hip Exam     Tenderness   The patient is experiencing tenderness in the greater trochanter. Range of Motion   The patient has normal left hip ROM. Muscle Strength   The patient has normal left hip strength.      Tests   DAVIDE: negative    Other   Erythema: absent  Sensation: normal  Pulse: present              I have personally reviewed pertinent films in PACS. Large joint arthrocentesis: L greater trochanteric bursa  Universal Protocol:  Consent: Verbal consent obtained. Risks and benefits: risks, benefits and alternatives were discussed  Consent given by: patient  Patient understanding: patient states understanding of the procedure being performed  Site marked: the operative site was marked  Patient identity confirmed: verbally with patient    Supporting Documentation  Indications: pain   Procedure Details  Location: hip - L greater trochanteric bursa  Preparation: Patient was prepped and draped in the usual sterile fashion  Needle size: 22 G  Ultrasound guidance: no  Approach: lateral  Medications administered: 8 mL bupivacaine 0.25 %; 6 mg betamethasone acetate-betamethasone sodium phosphate 6 (3-3) mg/mL    Patient tolerance: patient tolerated the procedure well with no immediate complications  Dressing:  Sterile dressing applied    Large joint arthrocentesis: R knee  Universal Protocol:  Consent: Verbal consent obtained.   Risks and benefits: risks, benefits and alternatives were discussed  Consent given by: patient  Patient understanding: patient states understanding of the procedure being performed  Site marked: the operative site was marked  Patient identity confirmed: verbally with patient    Supporting Documentation  Indications: pain   Procedure Details  Location: knee - R knee  Preparation: Patient was prepped and draped in the usual sterile fashion  Needle size: 22 G  Ultrasound guidance: no  Approach: anterolateral  Medications administered: 8 mL bupivacaine 0.25 %; 6 mg betamethasone acetate-betamethasone sodium phosphate 6 (3-3) mg/mL    Patient tolerance: patient tolerated the procedure well with no immediate complications  Dressing:  Sterile dressing applied        Scribe Attestation    I,:  Avtar Nunez am acting as a scribe while in the presence of the attending physician.:       I,:  Becky Chiu MD personally performed the services described in this documentation    as scribed in my presence.:

## 2023-08-15 ENCOUNTER — EVALUATION (OUTPATIENT)
Dept: PHYSICAL THERAPY | Facility: CLINIC | Age: 58
End: 2023-08-15
Payer: COMMERCIAL

## 2023-08-15 DIAGNOSIS — M17.11 PRIMARY OSTEOARTHRITIS OF RIGHT KNEE: ICD-10-CM

## 2023-08-15 DIAGNOSIS — M70.62 GREATER TROCHANTERIC BURSITIS OF LEFT HIP: Primary | ICD-10-CM

## 2023-08-15 PROCEDURE — 97162 PT EVAL MOD COMPLEX 30 MIN: CPT | Performed by: PHYSICAL THERAPIST

## 2023-08-15 NOTE — PROGRESS NOTES
PT Evaluation     Today's date: 8/15/2023  Patient name: Alfred Anguiano  : 1965  MRN: [id number]  Referring provider: Fahad Kent MD  Dx:   Encounter Diagnosis     ICD-10-CM    1. Greater trochanteric bursitis of left hip  M70.62 Ambulatory Referral to Physical Therapy                     Assessment  Assessment details: Pt is a 56y.o. year old female coming to outpatient PT with a diagnosis of L hip bursitis/ R knee severe OA. Pt presents with increased pain and TTP, decreased hip ROM, decreased strength, and overall decreased functional mobility. Pt would benefit from skilled PT services in order to address these deficits and reach maximum level of function. Thank you kindly for the referral!    Pt was issued a written HEP to be performed on a daily basis. Impairments: abnormal gait, abnormal or restricted ROM, activity intolerance, impaired physical strength, lacks appropriate home exercise program, pain with function and weight-bearing intolerance  Understanding of Dx/Px/POC: good   Prognosis: good    Goals  STG's ( 3-4 weeks)  1. Pt will be independent in HEP  2. Pt will have less pain in L hip rated 3/10 at worst  LTG's ( 6- 8 weeks)  1. Improve FOTO score by 4-6 points  2. Pt will have less pain with walking activities  3. Pt will have improved tolerance with going up and down the steps  4.  Pt will have improved L hip strength by 1/2 grade    Plan  Patient would benefit from: skilled physical therapy  Planned modality interventions: cryotherapy  Planned therapy interventions: manual therapy, muscle pump exercises, stretching, therapeutic activities, therapeutic exercise, functional ROM exercises, flexibility and home exercise program  Frequency: 2x week  Duration in weeks: 6  Plan of Care beginning date: 8/15/2023  Plan of Care expiration date: 2023  Treatment plan discussed with: patient and PTA        Subjective Evaluation    History of Present Illness  Mechanism of injury: Pt reports L hip pain for about 3 months and R knee pain for several years 2* unknown etiology. Pt received an injection in her hip and knee with some relief. Pt has a history of LBP. Pt has increased pain with walking, standing, going up and down the steps, lifting her leg to get in and out of her truck and transferring sit to stand. Pt generally has less pain with sitting, but at times can be painful. Pt has pain at night when sleeping and the pain goes back and forth between her hip and knee. Work: part time work as   Hobbies: travel  Gait: (+) antalgic gait  Patient Goals  Patient goals for therapy: decreased pain    Pain  At best pain rating: 3  At worst pain ratin  Location: L hip; R knee pain: 0/10 best   7-8/10 at worst  Quality: sharp    Social Support  Steps to enter house: yes (3)  Stairs in house: yes (15)   Lives in: multiple-level home  Lives with: adult children and spouse    Employment status: working    Diagnostic Tests  X-ray: abnormal (severe OA)  Treatments  Previous treatment: injection treatment (PT for the knee only, not for the hip)        Objective     Neurological Testing     Sensation     Hip   Left Hip   Intact: light touch    Right Hip   Intact: light touch    Reflexes   Left   Patellar (L4): trace (1+)  Achilles (S1): normal (2+)    Right   Patellar (L4): trace (1+)  Achilles (S1): normal (2+)    Active Range of Motion   Left Hip   Flexion: 125 degrees with pain  External rotation (90/90): [facility]  Internal rotation (90/90):  WFL    Right Knee   Flexion: 135 degrees with pain  Extension: -5 degrees with pain    Additional Active Range of Motion Details  (+) DAVIDE test  (+) piriformis tightness L > R  (-) Rodney's test  HS flexibility: WFL's  (+) TTP R medial knee  HS flexibility: WFL's bilaterally    Passive Range of Motion     Right Knee   Flexion: 140 degrees with pain  Extension: -5 degrees with pain    Strength/Myotome Testing     Left Hip   Planes of Motion   Flexion: 4 (pain)  Extension: 4 (pain)  Abduction: 4+  External rotation: 4  Internal rotation: 4+    Right Hip   Planes of Motion   Flexion: 4+  Extension: 4+  Abduction: 5  External rotation: 4+  Internal rotation: 4+    Left Knee   Normal strength    Right Knee   Flexion: 4+  Extension: 4+    Additional Strength Details  Ankle df MMT: 5/5        Dx: L hip bursitis/ R knee OA  EPOC: 9/26/23  CO-MORBIDITIES:   PERSONAL FACTORS:   Precautions: none      Manuals             L hip glut medius MFR             R knee medial MFR             Patellar PROM                          Neuro Re-Ed             sidestepping             Tandem walking             SLS             bridges             clamshells                                       Ther Ex             Bike for ROM             Gastroc stretch             Stand HR             LAQ             Quad sets             SLR: toe straight             S/l hip abduction             Leg press              SLR toe straight             Piriformis stretch             SKTC                          Ther Activity             Step ups: FW hold            Step ups: lat hold            Gait Training                                       Modalities             Cp post tx

## 2023-08-17 ENCOUNTER — APPOINTMENT (OUTPATIENT)
Dept: PHYSICAL THERAPY | Facility: CLINIC | Age: 58
End: 2023-08-17
Payer: COMMERCIAL

## 2023-08-17 NOTE — ASSESSMENT & PLAN NOTE
Age of Onset : 5/19[birth year]22 while in the hospital for CHF exacerbation   Last seen in Neuro: 3/7/23  Plan: Cont Keppra 500 mg po BID per Neuro  No sz since last visit   Aj Gallardo is doing well s/p left total knee arthroplasty on 2/6/23  X-rays were reviewed with her that revealed a well aligned prosthesis with no evidence of loosening  She may get incision wet in the shower  No soaking for the next 1 to 2 weeks  She is to continue formal physical therapy  Continue aspirin for DVT prophylaxis for 6 weeks total   Continue ice and elevation  Follow-up in 3 months with repeat x-rays  All questions were answered to patient's satisfaction

## 2023-08-21 ENCOUNTER — APPOINTMENT (OUTPATIENT)
Dept: PHYSICAL THERAPY | Facility: CLINIC | Age: 58
End: 2023-08-21
Payer: COMMERCIAL

## 2023-08-23 ENCOUNTER — TELEPHONE (OUTPATIENT)
Age: 58
End: 2023-08-23

## 2023-08-23 ENCOUNTER — HOSPITAL ENCOUNTER (OUTPATIENT)
Dept: MRI IMAGING | Facility: HOSPITAL | Age: 58
Discharge: HOME | End: 2023-08-23
Attending: PODIATRIST
Payer: COMMERCIAL

## 2023-08-23 ENCOUNTER — OFFICE VISIT (OUTPATIENT)
Dept: PHYSICAL THERAPY | Facility: CLINIC | Age: 58
End: 2023-08-23
Payer: COMMERCIAL

## 2023-08-23 DIAGNOSIS — S86.112A RUPTURE OF POSTERIOR TIBIALIS TENDON, LEFT, INITIAL ENCOUNTER: ICD-10-CM

## 2023-08-23 DIAGNOSIS — M70.62 GREATER TROCHANTERIC BURSITIS OF LEFT HIP: Primary | ICD-10-CM

## 2023-08-23 DIAGNOSIS — M17.11 PRIMARY OSTEOARTHRITIS OF RIGHT KNEE: ICD-10-CM

## 2023-08-23 PROCEDURE — G1004 CDSM NDSC: HCPCS

## 2023-08-23 PROCEDURE — 97110 THERAPEUTIC EXERCISES: CPT

## 2023-08-23 PROCEDURE — 73721 MRI JNT OF LWR EXTRE W/O DYE: CPT

## 2023-08-23 PROCEDURE — 97140 MANUAL THERAPY 1/> REGIONS: CPT

## 2023-08-23 NOTE — TELEPHONE ENCOUNTER
Caller:Patient    Doctor:Elbert    Reason for call:Wants to know if she has to do blood work for surgery and if she should stop taking her vitamins.     Call back#: [phone]

## 2023-08-23 NOTE — PROGRESS NOTES
Daily Note     Today's date: 2023  Patient name: Clau Malagon  : 1965  MRN: [id number]  Referring provider: Stone Romero MD  Dx:   Encounter Diagnosis     ICD-10-CM    1. Greater trochanteric bursitis of left hip  M70.62       2. Primary osteoarthritis of right knee  M17.11                      Subjective: Pt c/o soreness in the hip and the knee but reports she is improving. Objective: See treatment diary below      Assessment: Tolerated treatment well. Patient exhibited good technique with therapeutic exercises and would benefit from continued PT towork on LE strengthening. Pt w/ very little patella ROM in inf/sup movt's. Pt w/ TrP in L glut causing her radic sxs to the glut med/ troch bursae. Plan: Continue per plan of care. Progress treatment as tolerated.        Daily Treatment Diary     Dx: L hip bursitis/ R knee OA  EPOC: 23  CO-MORBIDITIES:   PERSONAL FACTORS:   Precautions: none      Manuals             L hip glut medius MFR JK            R knee medial MFR JK            Patellar PROM JK             12'            Neuro Re-Ed             sidestepping             Tandem walking             SLS             bridges             [facility]                                       Ther Ex             Bike for ROM 6'            Gastroc stretch 20"x3            Stand HR 20            LAQ 15x5"              Quad sets 10x10"            SLR: toe straight 10            S/l hip abduction             Leg press              SLR hip in ER 10            Piriformis stretch             SKTC                          Ther Activity             Step ups: FW hold            Step ups: lat hold            Gait Training                                       Modalities             Cp post tx 10'

## 2023-08-28 ENCOUNTER — APPOINTMENT (OUTPATIENT)
Dept: PHYSICAL THERAPY | Facility: CLINIC | Age: 58
End: 2023-08-28
Payer: COMMERCIAL

## 2023-08-30 ENCOUNTER — OFFICE VISIT (OUTPATIENT)
Dept: PHYSICAL THERAPY | Facility: CLINIC | Age: 58
End: 2023-08-30
Payer: COMMERCIAL

## 2023-08-30 DIAGNOSIS — M17.11 PRIMARY OSTEOARTHRITIS OF RIGHT KNEE: ICD-10-CM

## 2023-08-30 DIAGNOSIS — M70.62 GREATER TROCHANTERIC BURSITIS OF LEFT HIP: Primary | ICD-10-CM

## 2023-08-30 PROCEDURE — 97140 MANUAL THERAPY 1/> REGIONS: CPT

## 2023-08-30 PROCEDURE — 97110 THERAPEUTIC EXERCISES: CPT

## 2023-08-30 PROCEDURE — 97112 NEUROMUSCULAR REEDUCATION: CPT

## 2023-08-30 NOTE — PROGRESS NOTES
Daily Note     Today's date: 2023  Patient name: Clau Malagon  : 1965  MRN: [id number]  Referring provider: Stone Romero MD  Dx:   Encounter Diagnosis     ICD-10-CM    1. Greater trochanteric bursitis of left hip  M70.62       2. Primary osteoarthritis of right knee  M17.11                      Subjective: Pt reports she put ice on her knee frequently and is still having pain. Reports the hip is feeling better and she is able to sleep on that side. Objective: See treatment diary below      Assessment: Tolerated treatment well w/ a progression of ex's. Patient demonstrated fatigue post treatment and would benefit from continued PT for strengthening of the LE's, wm glut med. Plan: Continue per plan of care. Progress treatment as tolerated.        Daily Treatment Diary     Dx: L hip bursitis/ R knee OA  EPOC: 23  CO-MORBIDITIES:   PERSONAL FACTORS:   Precautions: none      Manuals            L hip glut medius MFR JK JK           R knee medial MFR JK JK           Patellar PROM JK JK            12' 12'           Neuro Re-Ed             sidestepping  PTB 2 laps           Tandem walking  Foam 2 laps           SLS             bridges  10x5"           clamshells                                       Ther Ex             Bike for ROM 6' 6'           Gastroc stretch 20"x3 slt bd 1'           Stand HR 20 slt bd 20           LAQ 15x5"   15x5"           Quad sets 10x10" 10x5"           SLR: toe straight 10 10           S/l hip abduction             Leg press   75# 10           SLR hip in ER 10 10           Piriformis stretch  3x20"           SKTC  3x20"                        Ther Activity             Step ups: FW hold            Step ups: lat hold            Gait Training                                       Modalities             Cp post tx 10' MH hip SL 10'

## 2023-08-31 ENCOUNTER — TELEPHONE (OUTPATIENT)
Age: 58
End: 2023-08-31

## 2023-08-31 NOTE — TELEPHONE ENCOUNTER
Caller: Patient    Doctor:Elbert    Reason for call: Pt. Is at [facility]  Waiting to have  bloodwork done. However  They need the script for the bloodwork. Can someone fax it to them?     Call back#:FAX: [phone]

## 2023-08-31 NOTE — TELEPHONE ENCOUNTER
Caller: Capri Latif     Doctor/Office: /Gema    #: [phone]    Escalation: Care Patient is currently at [facility] for blood work but does not have a script. She is requesting a script be faxed to [phone]. I tried to contact office at time of call but was unable to reach anyone.  Thank you

## 2023-09-05 ENCOUNTER — APPOINTMENT (OUTPATIENT)
Dept: PHYSICAL THERAPY | Facility: CLINIC | Age: 58
End: 2023-09-05
Payer: COMMERCIAL

## 2023-09-07 NOTE — PRE-PROCEDURE INSTRUCTIONS
Pre-Surgery Instructions:   Medication Instructions   • acetaminophen (TYLENOL) 325 mg tablet Uses PRN- OK to take day of surgery   • albuterol (PROVENTIL HFA,VENTOLIN HFA) 90 mcg/act inhaler Uses PRN- OK to take day of surgery   • alendronate (FOSAMAX) 70 mg tablet takes on fridays   • ascorbic acid (VITAMIN C) 500 MG tablet Stop taking 7 days prior to surgery. • Calcium Carb-Cholecalciferol 600-10 MG-MCG TABS Stop taking 7 days prior to surgery. • hydroxychloroquine (PLAQUENIL) 200 mg tablet take after surgery- needs food   • Multiple Vitamin (MULTI-VITAMIN) tablet Stop taking 7 days prior to surgery. • naproxen (NAPROSYN) 500 mg tablet Stop taking 7 days prior to surgery. Medication instructions for day surgery reviewed. Please use only a sip of water to take your instructed medications. Avoid all over the counter vitamins, supplements and NSAIDS for one week prior to surgery per anesthesia guidelines. Tylenol is ok to take as needed. You will receive a call one business day prior to surgery with an arrival time and hospital directions. If your surgery is scheduled on a Monday, the hospital will be calling you on the Friday prior to your surgery. If you have not heard from anyone by 8pm, please call the hospital supervisor through the hospital  at [phone]. Eddie Ruff [phone]). Do not eat or drink anything after midnight the night before your surgery, including candy, mints, lifesavers, or chewing gum. Do not drink alcohol 24hrs before your surgery. Try not to smoke at least 24hrs before your surgery. Follow the pre surgery showering instructions as listed in the [facility] Surgical Experience Booklet” or otherwise provided by your surgeon's office. Do not shave the surgical area 24 hours before surgery. Do not apply any lotions, creams, including makeup, cologne, deodorant, or perfumes after showering on the day of your surgery.      No contact lenses, eye make-up, or artificial eyelashes. Remove nail polish, including gel polish, and any artificial, gel, or acrylic nails if possible. Remove all jewelry including rings and body piercing jewelry. Wear causal clothing that is easy to take on and off. Consider your type of surgery. Keep any valuables, jewelry, piercings at home. Please bring any specially ordered equipment (sling, braces) if indicated. Arrange for a responsible person to drive you to and from the hospital on the day of your surgery. Visitor Guidelines discussed. Call the surgeon's office with any new illnesses, exposures, or additional questions prior to surgery. Please reference your [facility] Surgical Experience Booklet” for additional information to prepare for your upcoming surgery.

## 2023-09-08 ENCOUNTER — PATIENT OUTREACH (OUTPATIENT)
Dept: OTHER | Facility: CLINIC | Age: 58
End: 2023-09-08

## 2023-09-11 ENCOUNTER — EVALUATION (OUTPATIENT)
Dept: PHYSICAL THERAPY | Facility: CLINIC | Age: 58
End: 2023-09-11
Payer: COMMERCIAL

## 2023-09-11 DIAGNOSIS — M70.62 GREATER TROCHANTERIC BURSITIS OF LEFT HIP: Primary | ICD-10-CM

## 2023-09-11 DIAGNOSIS — M17.11 PRIMARY OSTEOARTHRITIS OF RIGHT KNEE: ICD-10-CM

## 2023-09-11 PROCEDURE — 97140 MANUAL THERAPY 1/> REGIONS: CPT | Performed by: PHYSICAL THERAPIST

## 2023-09-11 PROCEDURE — 97112 NEUROMUSCULAR REEDUCATION: CPT | Performed by: PHYSICAL THERAPIST

## 2023-09-11 PROCEDURE — 97110 THERAPEUTIC EXERCISES: CPT | Performed by: PHYSICAL THERAPIST

## 2023-09-11 NOTE — PROGRESS NOTES
PT Discharge    Today's date: 2023  Patient name: Gayle Acosta  : 1965  MRN: [id number]  Referring provider: Porfirio Jessica MD  Dx:   Encounter Diagnosis     ICD-10-CM    1. Greater trochanteric bursitis of left hip  M70.62       2. Primary osteoarthritis of right knee  M17.11                      Assessment  Assessment details: Since starting skilled PT, pain levels are decreased, B LE strength has improved, R knee ROM improved with good functional progress. Recommend pt be discharged from skilled PT at this time. Pt has been non compliant in attendance in therapy and has cancelled or no showed 6 apts in the past month. Pt is scheduled for foot surgery on 23. Pt may need to return to PT for her foot when cleared by physician. Impairments: activity intolerance and pain with function  Understanding of Dx/Px/POC: good   Prognosis: good    Goals  STG's ( 3-4 weeks)  1. Pt will be independent in HEP- met  2. Pt will have less pain in L hip rated 3/10 at worst- met  LTG's ( 6- 8 weeks)  1. Improve FOTO score by 4-6 points- met  2. Pt will have less pain with walking activities- met  3. Pt will have improved tolerance with going up and down the steps- partial met  4. Pt will have improved L hip strength by 1/2 grade- met    Plan  Frequency: D/c to HEP. Plan of Care beginning date: 8/15/2023  Plan of Care expiration date: 2023  Treatment plan discussed with: patient and PTA        Subjective Evaluation    History of Present Illness  Mechanism of injury: I.E: Pt reports L hip pain for about 3 months and R knee pain for several years 2* unknown etiology. Pt received an injection in her hip and knee with some relief. Pt has a history of LBP. Pt has increased pain with walking, standing, going up and down the steps, lifting her leg to get in and out of her truck and transferring sit to stand. Pt generally has less pain with sitting, but at times can be painful.  Pt has pain at night when sleeping and the pain goes back and forth between her hip and knee. 23: Pt is compliant in HEP. Pt reports her hip and knee pain are improving. Pt has less pain with walking and standing activities. Pt has good nights and bad nights with sleeping. Pt has less pain when getting in and out of her trunk. Work: part time work as   Hobbies: travel  Gait: mild antalgic gait  Patient Goals  Patient goals for therapy: decreased pain    Pain  At best pain ratin  At worst pain ratin  Location: L hip; R knee pain: 0/10 best   5/10 at worst  Quality: sharp    Social Support  Steps to enter house: yes (3)  Stairs in house: yes (15)   Lives in: multiple-level home  Lives with: adult children and spouse    Employment status: working    Diagnostic Tests  X-ray: abnormal (severe OA)  Treatments  Previous treatment: injection treatment (PT for the knee only, not for the hip)        Objective     Neurological Testing     Sensation     Hip   Left Hip   Intact: light touch    Right Hip   Intact: light touch    Reflexes   Left   Patellar (L4): trace (1+)  Achilles (S1): normal (2+)    Right   Patellar (L4): trace (1+)  Achilles (S1): normal (2+)    Active Range of Motion   Left Hip   Flexion: 125 degrees with pain  External rotation (90/90): [facility]  Internal rotation (90/90):  WFL    Right Knee   Flexion: 135 degrees   Extension: 0 degrees     Additional Active Range of Motion Details  (+) DAVIDE test  (+) piriformis tightness L > R  (-) Rodney's test  HS flexibility: WFL's  (+) TTP R medial knee  HS flexibility: WFL's bilaterally    Passive Range of Motion     Right Knee   Flexion: 140 degrees   Extension: 0 degrees     Strength/Myotome Testing     Left Hip   Planes of Motion   Flexion: 4+  Extension: 4+  Abduction: 5  External rotation: 5  Internal rotation: 5    Right Hip   Planes of Motion   Flexion: 4+  Extension: 4+  Abduction: 5  External rotation: 5  Internal rotation: 5    Left Knee   Normal strength    Right Knee   Flexion: 5  Extension: 5    Additional Strength Details  Ankle df MMT: 5/5         Dx: L hip bursitis/ R knee OA  EPOC: 9/26/23  CO-MORBIDITIES:   PERSONAL FACTORS:   Precautions: none      Manuals 8/23 8/30 9/11          L hip glut medius MFR JK JK th          R knee medial MFR JK JK th          Patellar PROM Ekaterina Pack prog note th           12' 12' 15'          Neuro Re-Ed             sidestepping  PTB 2 laps PTB x2 laps          Tandem walking  Foam 2 laps Foam 2 laps          SLS   10"x3 ea          bridges  10x5" 10x5"          clamshells   10                                    Ther Ex             Bike for ROM 6' 6' 6'          Gastroc stretch 20"x3 slt bd 1' slt bd 1'          Stand HR 20 slt bd 20 20          LAQ 15x5"   15x5"           Quad sets 10x10" 10x5"           SLR: toe straight 10 10 10          S/l hip abduction   10          Leg press   75# 10 75# x20          SLR hip in ER 10 10 10          Piriformis stretch  3x20" L 3x20"          SKTC  3x20"                        Ther Activity             Step ups: FW hold            Step ups: lat hold            Gait Training                                       Modalities             Cp post tx 10' MH hip SL 10'

## 2023-09-12 ENCOUNTER — ANESTHESIA EVENT (OUTPATIENT)
Dept: PERIOP | Facility: HOSPITAL | Age: 58
End: 2023-09-12
Payer: COMMERCIAL

## 2023-09-13 ENCOUNTER — APPOINTMENT (OUTPATIENT)
Dept: RADIOLOGY | Facility: HOSPITAL | Age: 58
End: 2023-09-13
Payer: COMMERCIAL

## 2023-09-13 ENCOUNTER — APPOINTMENT (OUTPATIENT)
Dept: PHYSICAL THERAPY | Facility: CLINIC | Age: 58
End: 2023-09-13
Payer: COMMERCIAL

## 2023-09-13 ENCOUNTER — HOSPITAL ENCOUNTER (OUTPATIENT)
Facility: HOSPITAL | Age: 58
Setting detail: OUTPATIENT SURGERY
Discharge: HOME | End: 2023-09-13
Attending: PODIATRIST | Admitting: PODIATRIST
Payer: COMMERCIAL

## 2023-09-13 ENCOUNTER — ANESTHESIA (OUTPATIENT)
Dept: PERIOP | Facility: HOSPITAL | Age: 58
End: 2023-09-13
Payer: COMMERCIAL

## 2023-09-13 VITALS
WEIGHT: 160 LBS | OXYGEN SATURATION: 99 % | DIASTOLIC BLOOD PRESSURE: 57 MMHG | HEIGHT: 60 IN | TEMPERATURE: 98 F | HEART RATE: 64 BPM | RESPIRATION RATE: 12 BRPM | BODY MASS INDEX: 31.41 KG/M2 | SYSTOLIC BLOOD PRESSURE: 123 MMHG

## 2023-09-13 DIAGNOSIS — T65.291A TOXIC EFFECT OF TOBACCO AND NICOTINE, ACCIDENTAL OR UNINTENTIONAL, INITIAL ENCOUNTER: Primary | ICD-10-CM

## 2023-09-13 DIAGNOSIS — Z98.890 POSTOPERATIVE STATE: ICD-10-CM

## 2023-09-13 PROBLEM — IMO0002 LUPUS: Status: ACTIVE | Noted: 2023-09-13

## 2023-09-13 PROBLEM — M32.9 LUPUS (HCC): Status: ACTIVE | Noted: 2023-09-13

## 2023-09-13 PROBLEM — J45.909 ASTHMA: Status: ACTIVE | Noted: 2023-09-13

## 2023-09-13 PROCEDURE — C1713 ANCHOR/SCREW BN/BN,TIS/BN: HCPCS | Performed by: PODIATRIST

## 2023-09-13 PROCEDURE — 73630 X-RAY EXAM OF FOOT: CPT

## 2023-09-13 RX ORDER — CHLORHEXIDINE GLUCONATE 4 G/100ML
SOLUTION TOPICAL DAILY PRN
Status: DISCONTINUED | OUTPATIENT
Start: 2023-09-13 | End: 2023-09-13 | Stop reason: HOSPADM

## 2023-09-13 RX ORDER — LIDOCAINE HYDROCHLORIDE 10 MG/ML
INJECTION, SOLUTION EPIDURAL; INFILTRATION; INTRACAUDAL; PERINEURAL AS NEEDED
Status: DISCONTINUED | OUTPATIENT
Start: 2023-09-13 | End: 2023-09-13

## 2023-09-13 RX ORDER — ONDANSETRON 2 MG/ML
INJECTION INTRAMUSCULAR; INTRAVENOUS AS NEEDED
Status: DISCONTINUED | OUTPATIENT
Start: 2023-09-13 | End: 2023-09-13

## 2023-09-13 RX ORDER — ACETAMINOPHEN 325 MG/1
975 TABLET ORAL ONCE
Status: COMPLETED | OUTPATIENT
Start: 2023-09-13 | End: 2023-09-13

## 2023-09-13 RX ORDER — MAGNESIUM HYDROXIDE 1200 MG/15ML
LIQUID ORAL AS NEEDED
Status: DISCONTINUED | OUTPATIENT
Start: 2023-09-13 | End: 2023-09-13 | Stop reason: HOSPADM

## 2023-09-13 RX ORDER — FENTANYL CITRATE 50 UG/ML
INJECTION, SOLUTION INTRAMUSCULAR; INTRAVENOUS AS NEEDED
Status: DISCONTINUED | OUTPATIENT
Start: 2023-09-13 | End: 2023-09-13

## 2023-09-13 RX ORDER — ONDANSETRON 2 MG/ML
4 INJECTION INTRAMUSCULAR; INTRAVENOUS ONCE AS NEEDED
Status: DISCONTINUED | OUTPATIENT
Start: 2023-09-13 | End: 2023-09-13 | Stop reason: HOSPADM

## 2023-09-13 RX ORDER — FENTANYL CITRATE/PF 50 MCG/ML
50 SYRINGE (ML) INJECTION
Status: DISCONTINUED | OUTPATIENT
Start: 2023-09-13 | End: 2023-09-13 | Stop reason: HOSPADM

## 2023-09-13 RX ORDER — MIDAZOLAM HYDROCHLORIDE 2 MG/2ML
INJECTION, SOLUTION INTRAMUSCULAR; INTRAVENOUS AS NEEDED
Status: DISCONTINUED | OUTPATIENT
Start: 2023-09-13 | End: 2023-09-13

## 2023-09-13 RX ORDER — HYDROMORPHONE HCL/PF 1 MG/ML
0.5 SYRINGE (ML) INJECTION
Status: DISCONTINUED | OUTPATIENT
Start: 2023-09-13 | End: 2023-09-13 | Stop reason: HOSPADM

## 2023-09-13 RX ORDER — EPHEDRINE SULFATE 50 MG/ML
INJECTION INTRAVENOUS AS NEEDED
Status: DISCONTINUED | OUTPATIENT
Start: 2023-09-13 | End: 2023-09-13

## 2023-09-13 RX ORDER — SODIUM CHLORIDE, SODIUM LACTATE, POTASSIUM CHLORIDE, CALCIUM CHLORIDE 600; 310; 30; 20 MG/100ML; MG/100ML; MG/100ML; MG/100ML
125 INJECTION, SOLUTION INTRAVENOUS CONTINUOUS
Status: DISCONTINUED | OUTPATIENT
Start: 2023-09-13 | End: 2023-09-13 | Stop reason: HOSPADM

## 2023-09-13 RX ORDER — OXYCODONE HYDROCHLORIDE AND ACETAMINOPHEN 5; 325 MG/1; MG/1
1 TABLET ORAL EVERY 4 HOURS PRN
Qty: 30 TABLET | Refills: 0 | Status: SHIPPED | OUTPATIENT
Start: 2023-09-13 | End: 2023-09-22 | Stop reason: SDUPTHER

## 2023-09-13 RX ORDER — CHLORHEXIDINE GLUCONATE ORAL RINSE 1.2 MG/ML
15 SOLUTION DENTAL ONCE
Status: DISCONTINUED | OUTPATIENT
Start: 2023-09-13 | End: 2023-09-13 | Stop reason: HOSPADM

## 2023-09-13 RX ORDER — LIDOCAINE HYDROCHLORIDE 10 MG/ML
0.5 INJECTION, SOLUTION EPIDURAL; INFILTRATION; INTRACAUDAL; PERINEURAL ONCE AS NEEDED
Status: DISCONTINUED | OUTPATIENT
Start: 2023-09-13 | End: 2023-09-13 | Stop reason: HOSPADM

## 2023-09-13 RX ORDER — CEFAZOLIN SODIUM 1 G/50ML
1000 SOLUTION INTRAVENOUS ONCE
Status: COMPLETED | OUTPATIENT
Start: 2023-09-13 | End: 2023-09-13

## 2023-09-13 RX ORDER — ASPIRIN 325 MG
325 TABLET ORAL DAILY
Qty: 60 TABLET | Refills: 0 | Status: SHIPPED | OUTPATIENT
Start: 2023-09-13

## 2023-09-13 RX ORDER — PROPOFOL 10 MG/ML
INJECTION, EMULSION INTRAVENOUS AS NEEDED
Status: DISCONTINUED | OUTPATIENT
Start: 2023-09-13 | End: 2023-09-13

## 2023-09-13 RX ADMIN — PROPOFOL 150 MG: 10 INJECTION, EMULSION INTRAVENOUS at 12:45

## 2023-09-13 RX ADMIN — ACETAMINOPHEN 975 MG: 325 TABLET, FILM COATED ORAL at 11:43

## 2023-09-13 RX ADMIN — LIDOCAINE HYDROCHLORIDE 50 MG: 10 INJECTION, SOLUTION EPIDURAL; INFILTRATION; INTRACAUDAL; PERINEURAL at 12:45

## 2023-09-13 RX ADMIN — EPHEDRINE SULFATE 10 MG: 50 INJECTION INTRAVENOUS at 13:30

## 2023-09-13 RX ADMIN — CEFAZOLIN SODIUM 1000 MG: 1 SOLUTION INTRAVENOUS at 12:47

## 2023-09-13 RX ADMIN — FENTANYL CITRATE 25 MCG: 50 INJECTION, SOLUTION INTRAMUSCULAR; INTRAVENOUS at 14:02

## 2023-09-13 RX ADMIN — ONDANSETRON 4 MG: 2 INJECTION INTRAMUSCULAR; INTRAVENOUS at 14:18

## 2023-09-13 RX ADMIN — DEXMEDETOMIDINE 4 MCG: 100 INJECTION, SOLUTION, CONCENTRATE INTRAVENOUS at 14:44

## 2023-09-13 RX ADMIN — FENTANYL CITRATE 25 MCG: 50 INJECTION, SOLUTION INTRAMUSCULAR; INTRAVENOUS at 13:10

## 2023-09-13 RX ADMIN — SODIUM CHLORIDE, SODIUM LACTATE, POTASSIUM CHLORIDE, AND CALCIUM CHLORIDE: .6; .31; .03; .02 INJECTION, SOLUTION INTRAVENOUS at 11:55

## 2023-09-13 RX ADMIN — SODIUM CHLORIDE, SODIUM LACTATE, POTASSIUM CHLORIDE, AND CALCIUM CHLORIDE: .6; .31; .03; .02 INJECTION, SOLUTION INTRAVENOUS at 13:44

## 2023-09-13 RX ADMIN — FENTANYL CITRATE 50 MCG: 50 INJECTION, SOLUTION INTRAMUSCULAR; INTRAVENOUS at 13:52

## 2023-09-13 RX ADMIN — MIDAZOLAM 2 MG: 1 INJECTION INTRAMUSCULAR; INTRAVENOUS at 12:15

## 2023-09-13 NOTE — DISCHARGE SUMMARY
Discharge Summary Outpatient Procedure Podiatry -   Candelaria Rivera 56 y.o. female MRN: [id number]  Unit/Bed#: OR POOL Encounter: [id number]    Admission Date: 9/13/2023     Admitting Diagnosis: Rupture of posterior tibialis tendon, left, initial encounter [S86.112A]  Pes planus of left foot [M21.42]    Discharge Diagnosis: same    Procedures Performed: ARTHRODESIS / FUSION FOOT - LEFT TALO_NAV JOINT:   REPAIR POSTERIOR TIBIAL TENDON FOOT: 96758 (CPT®)    Complications: none    Condition at Discharge: stable    Discharge instructions/Information to patient and family:   See after visit summary for information provided to patient and family. Provisions for Follow-Up Care/Important appointments:  See after visit summary for information related to follow-up care and any pertinent home health orders. Discharge Medications:  See after visit summary for reconciled discharge medications provided to patient and family.

## 2023-09-13 NOTE — OP NOTE
OPERATIVE REPORT - Podiatry  PATIENT NAME: Temitope Hart    :  1965  MRN: [id number]  Pt Location:  OR ROOM 02    SURGERY DATE: 2023    Surgeon(s) and Role:     * Christy Edouard DPM - Primary     * Lewis Mejía DPM - Assisting    Pre-op Diagnosis:  Rupture of posterior tibialis tendon, left, initial encounter S86.112A  Pes planus of left foot M21.42    Post-Op Diagnosis Codes:     * Rupture of posterior tibialis tendon, left, initial encounter [S86.112A]     * Pes planus of left foot [M21.42]    Procedure(s) (LRB):  ARTHRODESIS / FUSION FOOT - LEFT TALO_NAV JOINT (Left)  REPAIR POSTERIOR TIBIAL TENDON FOOT (Left)    Specimen(s):  * No specimens in log *    Estimated Blood Loss:   Minimal    Drains:  External Urinary Catheter (Active)   Number of days: 217       Anesthesia Type:   General/LMA with popliteal block     Hemostasis:  Pneumatic ankle tourniquet set at 300 mmHg for 99 mins  Direct compression, electrocautery    Materials:  Implant Name Type Inv. Item Serial No.  Lot No. LRB No. Used Action   Staple Compression Plate S-Plate [id number]     [id number] Left 1 Implanted   HIMAX 15mm x 18mm x 18mm     [id number] Left 1 Implanted   SCREW NON-LCK 3.5 X 20MM [employer] CP - [id number]  SCREW NON-LCK 3.5 X 20MM [employer]OBAND CP  [employer]OS[employer] EXTREMITY SYS [id number] Left 1 Implanted   MotoBAND Non-locking Screw     [id number] Left 1 Implanted         Injectables:  none    Operative Findings:  Talonavicular fusion successful. Posterior tibial tendon multiple longitudinal tears were debrided and tendon was re-tubularized. Complications:   None    Procedure and Technique:     Under mild sedation, the patient was brought into the operating room and placed on the operating table in the supine position. IV sedation was achieved by anesthesia team and a universal timeout was performed where all parties are in agreement of correct patient, correct procedure and correct site.  A pneumatic tourniquet was then placed over the patient's left lower extremity with ample padding. A regional block was performed with local anesthetic. The foot was then prepped and draped in the usual aseptic manner. An esmarch bandage was used to exsangunate the foot and the pneumatic tourniquet was then inflated to 300 mmHg. Incision was made medially just proximal to the posterior tibial tendon. Dissection was made down exposing posterior tibial tendon which was significantly hypertrophied with multiple longitudinal chronic tears. These were excised with 15 blade and pickup tendon was debrided of chronic tendinosis. Attention was then drawn to the talonavicular joint. 2 wires were placed and joint was distracted. With the use of a curette the cartilage was removed from the articular surface of the navicular and talus. Irrigated with copious amounts sterile saline. Drill was then used to fenestrate the joint both talus and navicular. Compression was removed and provisional fixation was placed in the proper orientation with fluoroscopic guidance. Talonavicular plate was then placed dorsal on the joint confirmed with fluoroscopy. Staple and screws were placed according to  guidelines. Attention was redirected to the posterior tibial tendon and the tendon was read tubularized with 2-0 Vicryl suture. Surgical site was irrigated with copious amounts sterile saline. Deep tissues were closed with Vicryl. And subcutaneous and skin was closed with Vicryl and nylon suture. Foot was cleaned and dried and dressed with Betadine soaked Adaptic, 4 x 4 gauze, ABD, Kerlix. An posterior splint was then applied. The tourniquet was deflated and normal hyperemic response was noted to all digits. The patient tolerated the procedure and anesthesia well without immediate complications and transferred to PACU with vital signs stable.      Dr. Cele Lott was present during the entire procedure and participated in all key aspects. SIGNATURE: Zahra Sandra DPM  DATE: September 13, 2023  TIME: 2:58 PM      Portions of the record may have been created with voice recognition software. Occasional wrong word or "sound a like" substitutions may have occurred due to the inherent limitations of voice recognition software. Read the chart carefully and recognize, using context, where substitutions have occurred.

## 2023-09-13 NOTE — H&P
Podiatry -H&P note  Patient: Mary Anne Mi 56 y.o. female   MRN: [id number]  PCP: Nadine Solis  Unit/Bed#: OR POOL Encounter: [id number]  Date Of Visit: 23    ASSESSMENT:    Mary Anne Mi is a 56 y.o. female with:    1. Pes planus with talonavicular fault  2. Posterior Tibialis tendinosis Left  3. Pain in the left foot      PLAN:    · Patient to go to OR today,23, for  left talonavicular fusion with repair of posterior tibial tendon with Dr. Sarah Zuñiga. · Consent signed  · Confirmed NPO status. · Imaging reviewed  · Vitals, and current labs reviewed. No acute changes noted. · Alternatives, risks, and complications discussed with patient. · All questions answered. No guarantees given to outcome of procedure. · Rest of medical care per primary team.       SUBJECTIVE:     The patient was seen, evaluated, and assessed at bedside today. The patient was awake, alert, and in no acute distress. Patient confirmed NPO status. All questions and concerns regarding the surgical procedure for talonavicular fusion and repair of posterior tibial tendon addressed. Patient understands risks vs benefits of procedure and remains amenable with plan for surgery today. Patient denies N/V/F/chills/SOB/CP. OBJECTIVE:     Vitals:   /71   Pulse (!) 54   Temp 98.1 °F (36.7 °C) (Oral)   Resp 18   Ht 5' (1.524 m)   Wt 72.6 kg (160 lb)   SpO2 98%   BMI 31.25 kg/m²     Temp (24hrs), Av.1 °F (36.7 °C), Min:98.1 °F (36.7 °C), Max:98.1 °F (36.7 °C)      Physical Exam:     General:  Alert, cooperative, and in no distress. Lower extremity exam:  Cardiovascular status at baseline. Neurological status at baseline. Musculoskeletal status at baseline. No calf tenderness noted bilaterally. Clinical Images 23: Additional Data:     Labs: Invalid input(s): "LABALBU"        * I Have Reviewed All Lab Data Listed Above.     Recent Cultures (last 7 days):               Imaging: I have personally reviewed pertinent films in PACS  EKG, Pathology, and Other Studies: I have personally reviewed pertinent reports. ** Please Note: Portions of the record may have been created with voice recognition software. Occasional wrong word or "sound a like" substitutions may have occurred due to the inherent limitations of voice recognition software. Read the chart carefully and recognize, using context, where substitutions have occurred.  **

## 2023-09-13 NOTE — ANESTHESIA POSTPROCEDURE EVALUATION
Post-Op Assessment Note    CV Status:  Stable  Pain Score: 0    Pain management: adequate  Multimodal analgesia used between 6 hours prior to anesthesia start to PACU discharge    Mental Status:  Arousable and sleepy   Hydration Status:  Euvolemic   PONV Controlled:  None   Airway Patency:  Patent      Post Op Vitals Reviewed: Yes      Staff: CRNA         There were no known notable events for this encounter.     BP  117/60   Temp      Pulse 70   Resp   14   SpO2   97% on 2lpm nc

## 2023-09-13 NOTE — ANESTHESIA PREPROCEDURE EVALUATION
Procedure:  ARTHRODESIS / FUSION FOOT - LEFT TALO_NAV JOINT (Left: Foot)  REPAIR POSTERIOR TIBIAL TENDON FOOT (Left: Foot)    Relevant Problems   HEMATOLOGY   (+) Anemia      PULMONARY   (+) Asthma      Other   (+) Lupus (HCC)        Physical Exam    Airway    Mallampati score: II  TM Distance: <3 FB  Neck ROM: full     Dental   No notable dental hx     Cardiovascular      Pulmonary      Other Findings        Anesthesia Plan  ASA Score- 2     Anesthesia Type- general with ASA Monitors. Additional Monitors:   Airway Plan: LMA. Comment: Adductor and pop blocks. Plan Factors-    Chart reviewed. EKG reviewed. Existing labs reviewed. Patient summary reviewed. Patient is a current smoker. Induction- intravenous. Postoperative Plan-     Informed Consent- Anesthetic plan and risks discussed with patient and daughter. I personally reviewed this patient with the CRNA. Discussed and agreed on the Anesthesia Plan with the CRNA. Mecca Sharma

## 2023-09-13 NOTE — DISCHARGE INSTR - AVS FIRST PAGE
Dr. Jes Rosales Instructions    1. Take your prescribed medication as directed. 2. Upon arrival at home, lie down and elevate your surgical foot on 2 pillows. 3. Stay off your feet as much as possible for the first 24-48 hours. This is when your feet first swell and may become painful. After 48 hours you may begin limited walking following these restrictions:   Nonweightbearing to surgical foot  4. Drink large quantities of water. Consume no alcohol. Continue a well-balanced diet. 5. Report any unusual discomfort or fever to this office. 6. A limited amount of discomfort and swelling is to be expected. In some cases the skin may take on a bruised appearance. The surgical cleansing solution that was applied to your foot prior to the operation is dark in color and the operation site may appear to be oozing when it actually is not. 7. A slight amount of blood is to be expected, and is no cause for alarm. Do not remove the dressings. If there is active bleeding and if the bleeding persists, add additional gauze to the bandage, apply direct pressure, elevate your feet and call this office. 8. Do not get the dressings wet. As regular bathing may be inconvenient, sponge baths are recommended. 9. When anesthesia wears off and if any discomfort should be present, apply an ice pack directly over the operated area for 15 minute intervals for several hours or until the pain leaves. (USE IN EXCESS OF 15 MINUTES COULD CAUSE FROSTBITE). Do not use hot water bags or electric pads. A convenient icepack can be made by placing ice cubes in a plastic bag and covering this with a towel. 10. Take over-the-counter laxative for constipation, this is common with use of narcotic medications.

## 2023-09-15 ENCOUNTER — OFFICE VISIT (OUTPATIENT)
Dept: PODIATRY | Facility: CLINIC | Age: 58
End: 2023-09-15

## 2023-09-15 ENCOUNTER — TELEPHONE (OUTPATIENT)
Age: 58
End: 2023-09-15

## 2023-09-15 VITALS — WEIGHT: 160 LBS | BODY MASS INDEX: 31.41 KG/M2 | HEIGHT: 60 IN

## 2023-09-15 DIAGNOSIS — M79.672 PAIN IN LEFT FOOT: ICD-10-CM

## 2023-09-15 DIAGNOSIS — S86.112A RUPTURE OF POSTERIOR TIBIALIS TENDON, LEFT, INITIAL ENCOUNTER: Primary | ICD-10-CM

## 2023-09-15 PROCEDURE — 99024 POSTOP FOLLOW-UP VISIT: CPT | Performed by: PODIATRIST

## 2023-09-15 NOTE — TELEPHONE ENCOUNTER
Caller: Kathryn Giordano    Doctor/Office: Dr. Yue Rucker    #: [phone]    Escalation: Surgery/Woke up today and bandage on Sx foot is soaked in blood. Please call back/advise.  Thanks

## 2023-09-17 NOTE — PROGRESS NOTES
Assessment/Plan:   POD#2 S/P Talo-Tony Arthrodesis and primary repair of posterior tibial tendon left foot  Dry sterile compression dressing applied to left foot with reapplication of short leg splint  Emphasized with patient the importance of remaining nonweightbearing especially early on in the healing process so not to disrupt the arthrodesis site and compromise the fixation. Continue with ice, elevation, NWB until next visit for suture removal.  Patient instructed should she have any problems or issues that she should call and contact the office immediately. Emphasized again that she is not to remove the bandages or splints in any way shape or form unless consulting with myself. Follow-up as scheduled for suture removal and application of short leg cast.       Diagnoses and all orders for this visit:    Rupture of posterior tibialis tendon, left, initial encounter  -     X-ray foot left 3+ views; Future    Pain in left foot  -     X-ray foot left 3+ views; Future          Subjective:     Patient ID: Hoa George is a 56 y.o. female. 9/15/2023: 19-year-old female seen today for follow-up status post arthrodesis left talonavicular joint. Patient called and reports strikethrough bloody drainage of a moderate nature. Evaluating patient plantar forefoot area of the splint was noted to be soiled consistent with weightbearing on the foot contrary to instructions for complete nonweightbearing. When questioned patient also admitted to walking on foot because she needed to get to the bathroom and also removing the splint and Ace wrap's.    7/24/2023: 19-year-old female presents with continued pain/discomfort which has been increasing in her left rear foot. Recently completed her left knee surgery and now would like to have something done with her left ankle which seems to have more deformity now than previously noted. Patient reports pain daily that limits her walking.   She had a similar procedure that was done on her right foot several years ago to address the same issue. 1/31/2023: 70-year-old female presents with MRI disc/report for left rear foot done in [place], documents rupture of left posterior tibial tendon where she has most of her pain/discomfort in her left foot. Secondary concern of dropping a glass window on top of both feet at the same time 3 weeks ago which has resulted in some pain on the top of both feet. Reports also having surgery on her right foot for a tendon rupture in the past and has some retained hardware. Patient is scheduled to have a total knee replacement on the left side within the next 4 to 6 weeks with Dr. Barber Ventura. Review of Systems   Constitutional: Negative. HENT: Negative. Eyes: Negative. Respiratory: Negative. Cardiovascular: Negative. Gastrointestinal: Negative. Endocrine: Negative. Genitourinary: Negative. Musculoskeletal:        Pain and swelling left rear foot. Skin:        Bloody drainage within bandage. Allergic/Immunologic: Negative. Neurological: Negative. Hematological: Negative. Psychiatric/Behavioral: Negative. Objective:     Physical Exam  Constitutional:       Appearance: Normal appearance. HENT:      Head: Normocephalic. Right Ear: External ear normal.      Left Ear: External ear normal.   Eyes:      Pupils: Pupils are equal, round, and reactive to light. Cardiovascular:      Rate and Rhythm: Normal rate. Pulses: Normal pulses. Pulmonary:      Effort: Pulmonary effort is normal.   Musculoskeletal:         General: Swelling and tenderness present. Cervical back: Normal range of motion. Comments: Arthrodesis site appears clinically stable with palpation. Overall good alignment noted   Feet:      Right foot:      Protective Sensation: 10 sites tested. 10 sites sensed. Skin integrity: Skin integrity normal.      Left foot:      Protective Sensation: 10 sites tested. 10 sites sensed. Skin integrity: Skin integrity normal.   Skin:     General: Skin is warm and dry. Capillary Refill: Capillary refill takes 2 to 3 seconds. Comments: Incision coapted satisfactorily with no maceration despite moderate bloody drainage within bandage. No signs of infection erythema calor or cellulitis noted. Neurological:      General: No focal deficit present. Mental Status: She is alert.    Psychiatric:         Mood and Affect: Mood normal.

## 2023-09-18 ENCOUNTER — TELEPHONE (OUTPATIENT)
Age: 58
End: 2023-09-18

## 2023-09-20 ENCOUNTER — TELEPHONE (OUTPATIENT)
Age: 58
End: 2023-09-20

## 2023-09-20 NOTE — TELEPHONE ENCOUNTER
Caller: Patient    Doctor: Kurt Barillas    Reason for call: Says foot is painful, itchy and discolored. Can she be squeezed in today?     Call back#: [phone]

## 2023-09-21 ENCOUNTER — APPOINTMENT (OUTPATIENT)
Dept: RADIOLOGY | Facility: CLINIC | Age: 58
End: 2023-09-21
Payer: COMMERCIAL

## 2023-09-21 ENCOUNTER — OFFICE VISIT (OUTPATIENT)
Dept: PODIATRY | Facility: CLINIC | Age: 58
End: 2023-09-21

## 2023-09-21 VITALS
HEART RATE: 66 BPM | SYSTOLIC BLOOD PRESSURE: 112 MMHG | WEIGHT: 160 LBS | HEIGHT: 60 IN | DIASTOLIC BLOOD PRESSURE: 77 MMHG | BODY MASS INDEX: 31.41 KG/M2

## 2023-09-21 DIAGNOSIS — S86.112A RUPTURE OF POSTERIOR TIBIALIS TENDON, LEFT, INITIAL ENCOUNTER: ICD-10-CM

## 2023-09-21 DIAGNOSIS — M21.42 PES PLANUS OF LEFT FOOT: Primary | ICD-10-CM

## 2023-09-21 DIAGNOSIS — M79.672 PAIN IN LEFT FOOT: ICD-10-CM

## 2023-09-21 PROCEDURE — 99024 POSTOP FOLLOW-UP VISIT: CPT | Performed by: PODIATRIST

## 2023-09-21 PROCEDURE — 73630 X-RAY EXAM OF FOOT: CPT

## 2023-09-22 DIAGNOSIS — Z98.890 POSTOPERATIVE STATE: ICD-10-CM

## 2023-09-22 RX ORDER — OXYCODONE HYDROCHLORIDE AND ACETAMINOPHEN 5; 325 MG/1; MG/1
1 TABLET ORAL EVERY 4 HOURS PRN
Qty: 84 TABLET | Refills: 0 | Status: SHIPPED | OUTPATIENT
Start: 2023-09-22 | End: 2023-10-06

## 2023-09-23 NOTE — PROGRESS NOTES
Assessment/Plan:   Bandage change performed, Adaptic gauze dressing reapplied. X-rays personally reviewed and again reviewed with patient. Good talonavicular joint alignment noted with fixation intact. Short leg splint applied with Ace wrap's. Patient instructed not to remove. Follow-up Monday as scheduled for suture removal and application of short leg cast.  Continue with nonweightbearing, ice, elevation, and rest       Diagnoses and all orders for this visit:    Pes planus of left foot    Rupture of posterior tibialis tendon, left, initial encounter    Pain in left foot          Subjective:     Patient ID: Gavin Krause is a 56 y.o. female. 9/21/2023: 82-year-old female seen for follow-up concerned with discoloration of toes. Reports she is going to need more pain medication. States pain is getting better but still has episodes of pain. Denies any new pain or shortness of breath or calf pain. 9/15/2023: 82-year-old female seen today for follow-up status post arthrodesis left talonavicular joint. Patient called and reports strikethrough bloody drainage of a moderate nature. Evaluating patient plantar forefoot area of the splint was noted to be soiled consistent with weightbearing on the foot contrary to instructions for complete nonweightbearing. When questioned patient also admitted to walking on foot because she needed to get to the bathroom and also removing the splint and Ace wrap's.    7/24/2023: 82-year-old female presents with continued pain/discomfort which has been increasing in her left rear foot. Recently completed her left knee surgery and now would like to have something done with her left ankle which seems to have more deformity now than previously noted. Patient reports pain daily that limits her walking. She had a similar procedure that was done on her right foot several years ago to address the same issue.     1/31/2023: 82-year-old female presents with MRI disc/report for left rear foot done in [place], documents rupture of left posterior tibial tendon where she has most of her pain/discomfort in her left foot. Secondary concern of dropping a glass window on top of both feet at the same time 3 weeks ago which has resulted in some pain on the top of both feet. Reports also having surgery on her right foot for a tendon rupture in the past and has some retained hardware. Patient is scheduled to have a total knee replacement on the left side within the next 4 to 6 weeks with Dr. Merari Kuhn. Review of Systems   Constitutional: Negative. HENT: Negative. Eyes: Negative. Respiratory: Negative. Cardiovascular: Negative. Gastrointestinal: Negative. Endocrine: Negative. Genitourinary: Negative. Musculoskeletal:        Pain and swelling left rear foot and concerned with the toe color. Skin:        Subtle postinflammatory erythema toes 1 through 5. Allergic/Immunologic: Negative. Neurological: Negative. Hematological: Negative. Psychiatric/Behavioral: Negative. Objective:     Physical Exam  Constitutional:       Appearance: Normal appearance. HENT:      Head: Normocephalic. Right Ear: External ear normal.      Left Ear: External ear normal.   Eyes:      Pupils: Pupils are equal, round, and reactive to light. Cardiovascular:      Rate and Rhythm: Normal rate. Pulses: Normal pulses. Pulmonary:      Effort: Pulmonary effort is normal.   Musculoskeletal:         General: Swelling and tenderness present. Cervical back: Normal range of motion. Comments:   Arthrodesis site appears clinically stable with palpation. Diminished edema appreciated. X-rays show intact hardware with arthrodesis site maintaining structural alignment, overall good alignment noted   Feet:      Right foot:      Protective Sensation: 10 sites tested. 10 sites sensed.       Skin integrity: Skin integrity normal.      Left foot:      Protective Sensation: 10 sites tested. 10 sites sensed. Skin integrity: Skin integrity normal.   Skin:     General: Skin is warm and dry. Capillary Refill: Capillary refill takes 2 to 3 seconds. Comments: Incision coapted satisfactorily with scant drainage within bandage. No signs of infection erythema calor or cellulitis noted. Neurological:      General: No focal deficit present. Mental Status: She is alert.    Psychiatric:         Mood and Affect: Mood normal.

## 2023-09-25 ENCOUNTER — OFFICE VISIT (OUTPATIENT)
Dept: PODIATRY | Facility: CLINIC | Age: 58
End: 2023-09-25
Payer: COMMERCIAL

## 2023-09-25 ENCOUNTER — TELEPHONE (OUTPATIENT)
Age: 58
End: 2023-09-25

## 2023-09-25 VITALS
DIASTOLIC BLOOD PRESSURE: 75 MMHG | HEIGHT: 60 IN | SYSTOLIC BLOOD PRESSURE: 114 MMHG | HEART RATE: 71 BPM | WEIGHT: 160 LBS | BODY MASS INDEX: 31.41 KG/M2

## 2023-09-25 DIAGNOSIS — M21.42 PES PLANUS OF LEFT FOOT: ICD-10-CM

## 2023-09-25 DIAGNOSIS — Z98.890 POSTOPERATIVE STATE: Primary | ICD-10-CM

## 2023-09-25 DIAGNOSIS — S86.112A RUPTURE OF POSTERIOR TIBIALIS TENDON, LEFT, INITIAL ENCOUNTER: ICD-10-CM

## 2023-09-25 DIAGNOSIS — Z98.1 STATUS POST ARTHRODESIS: ICD-10-CM

## 2023-09-25 PROCEDURE — 29405 APPL SHORT LEG CAST: CPT | Performed by: PODIATRIST

## 2023-09-25 PROCEDURE — 99024 POSTOP FOLLOW-UP VISIT: CPT | Performed by: PODIATRIST

## 2023-09-27 NOTE — PROGRESS NOTES
Assessment/Plan:   Sutures removed and Steri-Strips applied, Adaptic gauze dressing then applied with short leg fiberglass cast.  Follow-up 6 weeks   Continue with nonweightbearing, ice, elevation, and rest       Diagnoses and all orders for this visit:    Postoperative state  -     Splint, Casting, Strapping    Status post arthrodesis  -     Splint, Casting, Strapping    Pes planus of left foot  -     Splint, Casting, Strapping    Rupture of posterior tibialis tendon, left, initial encounter  -     Splint, Casting, Strapping        Splint, Casting, Strapping    Date/Time: 9/25/2023 11:30 AM    Performed by: Emory Sexton DPM  Authorized by: Emory Sexton DPM  Universal Protocol:  Consent: Verbal consent obtained. Risks and benefits: risks, benefits and alternatives were discussed  Consent given by: patient  Time out: Immediately prior to procedure a "time out" was called to verify the correct patient, procedure, equipment, support staff and site/side marked as required. Patient understanding: patient states understanding of the procedure being performed  Patient identity confirmed: verbally with patient      Pre-procedure details:     Sensation:  Normal  Procedure details:     Laterality:  Left    Location:  Leg    Leg:  L lower legCast type:  Short leg      Supplies:  Fiberglass and cotton padding  Post-procedure details:     Pain:  Unchanged    Sensation:  Unchanged    Patient tolerance of procedure: Tolerated well, no immediate complications          Subjective:     Patient ID: Edwyna Sindy is a 56 y.o. female. 9/25/2023: 53-year-old female seen today for POV#3 S/P arthrodesis left foot T-N joint with primary repair of posterior tibial tendon. Reports diminishing pain and overall but did not feel splint was very comfortable. 9/21/2023: 53-year-old female seen for follow-up concerned with discoloration of toes. Reports she is going to need more pain medication.   States pain is getting better but still has episodes of pain. Denies any new pain or shortness of breath or calf pain. 9/15/2023: 82-year-old female seen today for follow-up status post arthrodesis left talonavicular joint. Patient called and reports strikethrough bloody drainage of a moderate nature. Evaluating patient plantar forefoot area of the splint was noted to be soiled consistent with weightbearing on the foot contrary to instructions for complete nonweightbearing. When questioned patient also admitted to walking on foot because she needed to get to the bathroom and also removing the splint and Ace wrap's.    7/24/2023: 82-year-old female presents with continued pain/discomfort which has been increasing in her left rear foot. Recently completed her left knee surgery and now would like to have something done with her left ankle which seems to have more deformity now than previously noted. Patient reports pain daily that limits her walking. She had a similar procedure that was done on her right foot several years ago to address the same issue. 1/31/2023: 82-year-old female presents with MRI disc/report for left rear foot done in [place], documents rupture of left posterior tibial tendon where she has most of her pain/discomfort in her left foot. Secondary concern of dropping a glass window on top of both feet at the same time 3 weeks ago which has resulted in some pain on the top of both feet. Reports also having surgery on her right foot for a tendon rupture in the past and has some retained hardware. Patient is scheduled to have a total knee replacement on the left side within the next 4 to 6 weeks with Dr. Kallie Garcia. Review of Systems   Constitutional: Negative. HENT: Negative. Eyes: Negative. Respiratory: Negative. Cardiovascular: Negative. Gastrointestinal: Negative. Endocrine: Negative. Genitourinary: Negative.     Musculoskeletal:        Mild/moderate postoperative pain and swelling left rear foot   Skin:        Surgical incision medial left rear foot   Allergic/Immunologic: Negative. Neurological: Negative. Hematological: Negative. Psychiatric/Behavioral: Negative. Objective:     Physical Exam  Constitutional:       Appearance: Normal appearance. HENT:      Head: Normocephalic. Right Ear: External ear normal.      Left Ear: External ear normal.   Eyes:      Pupils: Pupils are equal, round, and reactive to light. Cardiovascular:      Rate and Rhythm: Normal rate. Pulses: Normal pulses. Pulmonary:      Effort: Pulmonary effort is normal.   Musculoskeletal:         General: Swelling and tenderness present. Cervical back: Normal range of motion. Comments:   Arthrodesis site clinically stable with diminished edema. Most recent x-rays show intact hardware with arthrodesis site maintaining structural alignment, overall good alignment noted. Diminishing edema and pain. Feet:      Right foot:      Protective Sensation: 10 sites tested. 10 sites sensed. Skin integrity: Skin integrity normal.      Left foot:      Protective Sensation: 10 sites tested. 10 sites sensed. Skin integrity: Skin integrity normal.   Skin:     General: Skin is warm and dry. Capillary Refill: Capillary refill takes 2 to 3 seconds. Comments: Incision coapted satisfactorily, sutures intact with no drainage within bandage. No signs of infection erythema calor or cellulitis noted. Neurological:      General: No focal deficit present. Mental Status: She is alert.    Psychiatric:         Mood and Affect: Mood normal.

## 2023-09-28 ENCOUNTER — TELEPHONE (OUTPATIENT)
Dept: PODIATRY | Facility: CLINIC | Age: 58
End: 2023-09-28

## 2023-09-28 NOTE — TELEPHONE ENCOUNTER
Caller: Daniel Bowen    Doctor: Sheila De Dios DPM    Reason for call: She needs the fiberglass cast off. It is too tight, her toes are blue and she feels like there is no circulation and she cannot sleep with it on. Please Advise.     Call back#: [phone]

## 2023-10-03 ENCOUNTER — TELEPHONE (OUTPATIENT)
Age: 58
End: 2023-10-03

## 2023-10-03 NOTE — TELEPHONE ENCOUNTER
Pt called for refill of hydroxychloroquine, historically prescribed by dr outside of [facility]. A previous dr told her she should make an appt with a rheumatologist. She was given phone numbers of [facility] rheum office locations.

## 2023-10-03 NOTE — TELEPHONE ENCOUNTER
Reason for call:   [x] Refill   [] Prior Auth  [] Other:     Office:   [] PCP/Provider -    [x] Speciality/Provider - Ortho    Medication:  hydroxychloroquine (PLAQUENIL)    Dose/Frequency: 200 mg/ QD    Quantity: #90    Pharmacy: [facility]/pharmacy #[id number] [facility] - [address]      Does the patient have enough for 3 days?    [] Yes   [x] No - Send as HP to POD

## 2023-10-04 ENCOUNTER — OFFICE VISIT (OUTPATIENT)
Dept: PODIATRY | Facility: CLINIC | Age: 58
End: 2023-10-04

## 2023-10-04 ENCOUNTER — TELEPHONE (OUTPATIENT)
Age: 58
End: 2023-10-04

## 2023-10-04 VITALS
HEIGHT: 60 IN | WEIGHT: 160 LBS | BODY MASS INDEX: 31.41 KG/M2 | HEART RATE: 73 BPM | DIASTOLIC BLOOD PRESSURE: 71 MMHG | SYSTOLIC BLOOD PRESSURE: 105 MMHG

## 2023-10-04 DIAGNOSIS — Z98.1 STATUS POST ARTHRODESIS: ICD-10-CM

## 2023-10-04 DIAGNOSIS — M21.42 PES PLANUS OF LEFT FOOT: ICD-10-CM

## 2023-10-04 DIAGNOSIS — M79.605 PAIN IN LEFT LEG: Primary | ICD-10-CM

## 2023-10-04 DIAGNOSIS — Z98.890 POSTOPERATIVE STATE: Primary | ICD-10-CM

## 2023-10-04 PROCEDURE — 99024 POSTOP FOLLOW-UP VISIT: CPT | Performed by: PODIATRIST

## 2023-10-04 NOTE — PROGRESS NOTES
She presents for tight cast.  Cast removed. No wound or infection. No calf pain or swelling. Compression with Tubigrip / ACE.  NWB. Rx: CAM walker. Pt to see Dr. Dara Talley as scheduled.

## 2023-10-04 NOTE — PROGRESS NOTES
Patient called and is having pain in her left calf. Patient will come in for cast removal and application of cam walker and go for a venous duplex to rule out DVT left leg.

## 2023-10-04 NOTE — TELEPHONE ENCOUNTER
Caller:Patient    Doctor: Hailee Mccoy    Reason for call: Patient in a lot of pain on foot that surgery was performed on. Can we force her on? Thank you.     Call back#: [phone]

## 2023-10-04 NOTE — TELEPHONE ENCOUNTER
Requested medication(s) are due for refill today: Yes  Patient has already received a courtesy refill: No  Other reason request has been forwarded to provider: Please review if refill is appropriate.

## 2023-10-05 ENCOUNTER — TELEPHONE (OUTPATIENT)
Dept: PODIATRY | Facility: CLINIC | Age: 58
End: 2023-10-05

## 2023-10-05 NOTE — TELEPHONE ENCOUNTER
Left message with her for her appointment scheduled for Tuesday October 10, 2023 at 9:45 am. To call into [phone] and ask to be put through to the office please.

## 2023-10-06 ENCOUNTER — TELEPHONE (OUTPATIENT)
Age: 58
End: 2023-10-06

## 2023-10-06 NOTE — TELEPHONE ENCOUNTER
Caller: Amira Gonzalez    Doctor/Office: /Gema    #: [phone]    Escalation: Surgery Patient cannot get a ride to her post op on 10-10 in the morning. She has a ride at 4pm. Please return call and reschedule/force post op.  Thank you

## 2023-10-11 NOTE — TELEPHONE ENCOUNTER
After further review of the chart it looks like this medication has never been prescribed by ortho. Will forward to the clinical office to refuse the medication as auth provider is needed to refuse.

## 2023-10-12 ENCOUNTER — OFFICE VISIT (OUTPATIENT)
Dept: PODIATRY | Facility: CLINIC | Age: 58
End: 2023-10-12

## 2023-10-12 VITALS
SYSTOLIC BLOOD PRESSURE: 100 MMHG | HEIGHT: 60 IN | BODY MASS INDEX: 31.25 KG/M2 | HEART RATE: 67 BPM | DIASTOLIC BLOOD PRESSURE: 68 MMHG

## 2023-10-12 DIAGNOSIS — G89.29 CHRONIC PAIN OF LEFT ANKLE: ICD-10-CM

## 2023-10-12 DIAGNOSIS — Z98.1 STATUS POST ARTHRODESIS: Primary | ICD-10-CM

## 2023-10-12 DIAGNOSIS — M25.572 CHRONIC PAIN OF LEFT ANKLE: ICD-10-CM

## 2023-10-12 DIAGNOSIS — S86.112A RUPTURE OF POSTERIOR TIBIALIS TENDON, LEFT, INITIAL ENCOUNTER: ICD-10-CM

## 2023-10-12 DIAGNOSIS — M21.42 PES PLANUS OF LEFT FOOT: ICD-10-CM

## 2023-10-12 PROCEDURE — 99024 POSTOP FOLLOW-UP VISIT: CPT | Performed by: PODIATRIST

## 2023-10-16 ENCOUNTER — TELEPHONE (OUTPATIENT)
Age: 58
End: 2023-10-16

## 2023-10-16 DIAGNOSIS — M79.605 PAIN IN LEFT LEG: Primary | ICD-10-CM

## 2023-10-16 NOTE — TELEPHONE ENCOUNTER
Caller: Capri    Doctor: Víctor Son    Reason for call: Patient is having a lot of pain in left leg    Call back#: [phone]

## 2023-10-17 ENCOUNTER — HOSPITAL ENCOUNTER (OUTPATIENT)
Dept: NON INVASIVE DIAGNOSTICS | Facility: HOSPITAL | Age: 58
Discharge: HOME | End: 2023-10-17
Attending: PODIATRIST
Payer: COMMERCIAL

## 2023-10-17 DIAGNOSIS — M79.605 PAIN IN LEFT LEG: ICD-10-CM

## 2023-10-17 PROCEDURE — 93971 EXTREMITY STUDY: CPT

## 2023-10-17 RX ORDER — HYDROXYCHLOROQUINE SULFATE 200 MG/1
TABLET, FILM COATED ORAL
OUTPATIENT
Start: 2023-10-17

## 2023-10-18 ENCOUNTER — TELEPHONE (OUTPATIENT)
Age: 58
End: 2023-10-18

## 2023-10-18 NOTE — TELEPHONE ENCOUNTER
Caller: Anastasia    Reason for call: Patient is calling for the results of her [address]    Call back#: [phone]

## 2023-11-06 ENCOUNTER — APPOINTMENT (OUTPATIENT)
Dept: RADIOLOGY | Facility: CLINIC | Age: 58
End: 2023-11-06
Payer: COMMERCIAL

## 2023-11-06 ENCOUNTER — OFFICE VISIT (OUTPATIENT)
Dept: PODIATRY | Facility: CLINIC | Age: 58
End: 2023-11-06

## 2023-11-06 VITALS — HEIGHT: 60 IN | WEIGHT: 160 LBS | BODY MASS INDEX: 31.41 KG/M2

## 2023-11-06 DIAGNOSIS — Z98.1 STATUS POST ARTHRODESIS: ICD-10-CM

## 2023-11-06 DIAGNOSIS — Z98.890 POSTOPERATIVE STATE: ICD-10-CM

## 2023-11-06 DIAGNOSIS — M21.42 PES PLANUS OF LEFT FOOT: ICD-10-CM

## 2023-11-06 DIAGNOSIS — M21.42 PES PLANUS OF LEFT FOOT: Primary | ICD-10-CM

## 2023-11-06 PROCEDURE — 73630 X-RAY EXAM OF FOOT: CPT

## 2023-11-06 PROCEDURE — 99024 POSTOP FOLLOW-UP VISIT: CPT | Performed by: PODIATRIST

## 2023-11-06 NOTE — PROGRESS NOTES
Assessment/Plan:   Rx x-ray to further evaluate healing of arthrodesis site and evaluate lateral ankle pain.  X-ray personally reviewed shows stable TN arthrodesis with intact hardware.  No acute pathology noted on x-ray of the lateral rear foot.  Rx physical therapy 6 weeks duration.  May partial weight-bear with crutches or walker as tolerated with strengthening at PT.  Follow-up in approximately 6 weeks.     Diagnoses and all orders for this visit:    Pes planus of left foot  -     X-ray foot left 3+ views; Future  -     Ambulatory referral to Physical Therapy; Future    Status post arthrodesis  -     X-ray foot left 3+ views; Future  -     Ambulatory referral to Physical Therapy; Future    Postoperative state  -     X-ray foot left 3+ views; Future  -     Ambulatory referral to Physical Therapy; Future          Subjective:     Patient ID: Capri Preston is a 58 y.o. female.    11/6/2023: 57-year-old female seen today for follow-up status post arthrodesis left talonavicular joint.  Surgery was performed on 9/13/2023.    9/25/2023: 57-year-old female seen today for POV#3 S/P arthrodesis left foot T-N joint with primary repair of posterior tibial tendon.  Reports diminishing pain and overall but did not feel splint was very comfortable.    9/21/2023: 57-year-old female seen for follow-up concerned with discoloration of toes.  Reports she is going to need more pain medication.  States pain is getting better but still has episodes of pain.  Denies any new pain or shortness of breath or calf pain.    9/15/2023: 57-year-old female seen today for follow-up status post arthrodesis left talonavicular joint.  Patient called and reports strikethrough bloody drainage of a moderate nature.  Evaluating patient plantar forefoot area of the splint was noted to be soiled consistent with weightbearing on the foot contrary to instructions for complete nonweightbearing.  When questioned patient also admitted to walking on foot  because she needed to get to the bathroom and also removing the splint and Ace wrap's.    7/24/2023: 57-year-old female presents with continued pain/discomfort which has been increasing in her left rear foot.  Recently completed her left knee surgery and now would like to have something done with her left ankle which seems to have more deformity now than previously noted.  Patient reports pain daily that limits her walking.  She had a similar procedure that was done on her right foot several years ago to address the same issue.    1/31/2023: 57-year-old female presents with MRI disc/report for left rear foot done in [facility], documents rupture of left posterior tibial tendon where she has most of her pain/discomfort in her left foot.  Secondary concern of dropping a glass window on top of both feet at the same time 3 weeks ago which has resulted in some pain on the top of both feet.  Reports also having surgery on her right foot for a tendon rupture in the past and has some retained hardware.  Patient is scheduled to have a total knee replacement on the left side within the next 4 to 6 weeks with Dr. Moore.        Review of Systems   Constitutional: Negative.    HENT: Negative.     Eyes: Negative.    Respiratory: Negative.     Cardiovascular: Negative.    Gastrointestinal: Negative.    Endocrine: Negative.    Genitourinary: Negative.    Musculoskeletal:         Mild/moderate postoperative pain and swelling left rear foot   Skin:         Surgical incision medial left rear foot   Allergic/Immunologic: Negative.    Neurological: Negative.    Hematological: Negative.    Psychiatric/Behavioral: Negative.           Objective:     Physical Exam  Constitutional:       Appearance: Normal appearance.   HENT:      Head: Normocephalic.      Right Ear: External ear normal.      Left Ear: External ear normal.   Eyes:      Pupils: Pupils are equal, round, and reactive to light.   Cardiovascular:      Rate and Rhythm: Normal  rate.      Pulses: Normal pulses.   Pulmonary:      Effort: Pulmonary effort is normal.   Musculoskeletal:         General: Swelling and tenderness present.      Cervical back: Normal range of motion.      Comments:   Arthrodesis site clinically stable, much less edema.  Diminishing pain and discomfort from surgical site.    Patient is reporting lateral ankle pain which she reports was aggravated by the cam boot.  Pain localized to the anterior talofibular ligament region into the sinus tarsi area.  Peroneal tendons slightly tender.   Feet:      Right foot:      Protective Sensation: 10 sites tested.  10 sites sensed.      Skin integrity: Skin integrity normal.      Left foot:      Protective Sensation: 10 sites tested.  10 sites sensed.      Skin integrity: Skin integrity normal.   Skin:     General: Skin is warm and dry.      Capillary Refill: Capillary refill takes 2 to 3 seconds.      Comments: Incision coapted satisfactorily, sutures intact with no drainage within bandage.  No signs of infection erythema calor or cellulitis noted.   Neurological:      General: No focal deficit present.      Mental Status: She is alert.   Psychiatric:         Mood and Affect: Mood normal.

## 2023-11-14 ENCOUNTER — OFFICE VISIT (OUTPATIENT)
Dept: OBGYN CLINIC | Facility: CLINIC | Age: 58
End: 2023-11-14
Payer: COMMERCIAL

## 2023-11-14 VITALS
BODY MASS INDEX: 31.41 KG/M2 | DIASTOLIC BLOOD PRESSURE: 70 MMHG | SYSTOLIC BLOOD PRESSURE: 112 MMHG | WEIGHT: 160 LBS | HEIGHT: 60 IN

## 2023-11-14 DIAGNOSIS — M70.62 GREATER TROCHANTERIC BURSITIS OF LEFT HIP: Primary | ICD-10-CM

## 2023-11-14 PROCEDURE — 99213 OFFICE O/P EST LOW 20 MIN: CPT | Performed by: ORTHOPAEDIC SURGERY

## 2023-11-14 NOTE — PROGRESS NOTES
Assessment:/     1. Greater trochanteric bursitis of left hip        Plan:     Problem List Items Addressed This Visit          Musculoskeletal and Integument    Greater trochanteric bursitis of left hip - Primary      Findings are consistent with left greater trochanteric bursitis. Patient was educated today that corticosteroid injections are only temporary relief for symptoms and physical therapy is the best solution for treating greater trochanteric bursitis. Recommended to continue physical therapy for the left hip as well as her left foot as she recently had surgery done to the left foot. Patient understands she should continue physical therapy for the left hip as she is only gone to 4 physical therapy sessions since last visit. Use over-the-counter NSAIDs. Patient will follow-up in 3 months for her 1 year status post left TKA. All patient's questions were answered to her satisfaction. This note is created using dictation transcription. It may contain typographical errors, grammatical errors, improperly dictated words, background noise and other errors. Subjective:     Patient ID: Cristy Jensen is a 56 y.o. female. Chief Complaint:  Patient is a 73-year-old female here for follow-up evaluation left greater trochanteric bursitis. Patient was last seen 8/1/2023 where cortisone steroid injection was administered to the left greater troch. and referral for physical therapy was given for left greater troch bursitis. Patient states today that she has only gone 4 physical therapy sessions as she had surgery on her right foot on 9/13/2023. Patient states she did have symptomatic relief from steroid injection and those for physical therapy sessions. Patient states she will try to work on left hip physical therapy while going to physical therapy for her left foot.        Allergy:  No Known Allergies  Medications:  all current active meds have been reviewed  Past Medical History:  Past Medical History: Diagnosis Date    Asthma     Lupus ([address])     Primary osteoarthritis of left knee 1/11/2023     Past Surgical History:  Past Surgical History:   Procedure Laterality Date    COLONOSCOPY      FOOT FUSION Left 9/13/2023    Procedure: ARTHRODESIS / FUSION FOOT - LEFT TALO_NAV JOINT;  Surgeon: Lavell Prieto DPM;  Location:  MAIN OR;  Service: Podiatry    FOOT SURGERY Right     Talonavicular arthrodesis with calcaneal osteotomy    JOINT REPLACEMENT Left     knee    [place] ARTHRP KNE CONDYLE&PLATU MEDIAL&LAT COMPARTMENTS Left 02/06/2023    Procedure: ARTHROPLASTY KNEE TOTAL;  Surgeon: Olayinka Lamas MD;  Location:  MAIN OR;  Service: Orthopedics    [place] RPR TDN FLXR FOOT 1/2 W/O FREE GRAFG EACH TENDON Left 9/13/2023    Procedure: REPAIR POSTERIOR TIBIAL TENDON FOOT;  Surgeon: Lavell Prieto DPM;  Location:  MAIN OR;  Service: Podiatry     Family History:  Family History   Problem Relation Age of Onset    Osteoarthritis Mother     Stroke Brother      Social History:  Social History     Substance and Sexual Activity   Alcohol Use Not Currently     Social History     Substance and Sexual Activity   Drug Use Never     Social History     Tobacco Use   Smoking Status Some Days    Packs/day: 0.25    Years: 20.00    Total pack years: 5.00    Types: Cigarettes   Smokeless Tobacco Never     Review of Systems   Constitutional:  Negative for chills, fever and unexpected weight change. HENT:  Negative for hearing loss, nosebleeds and sore throat. Eyes:  Negative for pain, redness and visual disturbance. Respiratory:  Negative for cough, shortness of breath and wheezing. Cardiovascular:  Negative for chest pain, palpitations and leg swelling. Gastrointestinal:  Negative for abdominal pain, nausea and vomiting. Endocrine: Negative for polydipsia and polyuria. Genitourinary:  Negative for dysuria and hematuria.    Musculoskeletal:  Positive for arthralgias (Left hip) and gait problem (Ambulate with a cam walker on the left). See HPI   Skin:  Negative for rash and wound. Neurological:  Negative for dizziness, numbness and headaches. Psychiatric/Behavioral:  Negative for decreased concentration and suicidal ideas. The patient is not nervous/anxious. Objective:  BP Readings from Last 1 Encounters:   11/14/23 112/70      Wt Readings from Last 1 Encounters:   11/14/23 72.6 kg (160 lb)      BMI:   Estimated body mass index is 31.25 kg/m² as calculated from the following:    Height as of this encounter: 5' (1.524 m). Weight as of this encounter: 72.6 kg (160 lb). BSA:   Estimated body surface area is 1.7 meters squared as calculated from the following:    Height as of this encounter: 5' (1.524 m). Weight as of this encounter: 72.6 kg (160 lb). Physical Exam  Vitals and nursing note reviewed. Constitutional:       Appearance: Normal appearance. She is well-developed. HENT:      Head: Normocephalic and atraumatic. Right Ear: External ear normal.      Left Ear: External ear normal.   Eyes:      General: No scleral icterus. Extraocular Movements: Extraocular movements intact. Conjunctiva/sclera: Conjunctivae normal.   Pulmonary:      Effort: Pulmonary effort is normal. No respiratory distress. Musculoskeletal:      Cervical back: Neck supple. Comments: See Ortho exam   Skin:     General: Skin is warm and dry. Neurological:      General: No focal deficit present. Mental Status: She is alert and oriented to person, place, and time. Deep Tendon Reflexes: Reflexes are normal and symmetric. Psychiatric:         Mood and Affect: Mood normal.         Behavior: Behavior normal.       Left Hip Exam     Tenderness   The patient is experiencing tenderness in the greater trochanter. Range of Motion   The patient has normal left hip ROM. Muscle Strength   The patient has normal left hip strength.      Tests   DAVIDE: negative    Other   Erythema: absent  Scars: absent  Sensation: normal  Pulse: present            No new images to review today    Scribe Attestation      I,:  [facility] am acting as a scribe while in the presence of the attending physician.:       I,:  Vesta Pastor MD personally performed the services described in this documentation    as scribed in my presence.:

## 2023-12-04 NOTE — PROGRESS NOTES
Assessment/Plan:   Rx venous duplex to be completed by patient. Continue with range of motion stretching exercises as instructed. Follow-up in approximately 6 weeks. Prior x-rays personally reviewed   Continue with cam boot, nonweightbearing and elevation. Diagnoses and all orders for this visit:    Status post arthrodesis    Rupture of posterior tibialis tendon, left, initial encounter    Pes planus of left foot    Chronic pain of left ankle          Subjective:     Patient ID: Crescencio Maurer is a 66 y.o. female. 10/12/2023: 51-year-old female seen for POV #5, S/P arthrodesis left talonavicular joint and primary repair of left posterior tibial tendon. Patient was seen for tight cast after last visit by Dr. Amando Hidalgo. Venous duplex was ordered and patient has yet to complete this test.    9/25/2023: 51-year-old female seen today for POV#3 S/P arthrodesis left foot T-N joint with primary repair of posterior tibial tendon. Reports diminishing pain and overall but did not feel splint was very comfortable. 9/21/2023: 51-year-old female seen for follow-up concerned with discoloration of toes. Reports she is going to need more pain medication. States pain is getting better but still has episodes of pain. Denies any new pain or shortness of breath or calf pain. 9/15/2023: 51-year-old female seen today for follow-up status post arthrodesis left talonavicular joint. Patient called and reports strikethrough bloody drainage of a moderate nature. Evaluating patient plantar forefoot area of the splint was noted to be soiled consistent with weightbearing on the foot contrary to instructions for complete nonweightbearing. When questioned patient also admitted to walking on foot because she needed to get to the bathroom and also removing the splint and Ace wrap's.    7/24/2023: 51-year-old female presents with continued pain/discomfort which has been increasing in her left rear foot.   Recently completed her left knee surgery and now would like to have something done with her left ankle which seems to have more deformity now than previously noted. Patient reports pain daily that limits her walking. She had a similar procedure that was done on her right foot several years ago to address the same issue. 1/31/2023: 29-year-old female presents with MRI disc/report for left rear foot done in [place], documents rupture of left posterior tibial tendon where she has most of her pain/discomfort in her left foot. Secondary concern of dropping a glass window on top of both feet at the same time 3 weeks ago which has resulted in some pain on the top of both feet. Reports also having surgery on her right foot for a tendon rupture in the past and has some retained hardware. Patient is scheduled to have a total knee replacement on the left side within the next 4 to 6 weeks with Dr. Ezequiel Agudelo. Review of Systems      Objective:     Physical Exam  Constitutional:       Appearance: Normal appearance. HENT:      Head: Normocephalic. Right Ear: External ear normal.      Left Ear: External ear normal.   Eyes:      Pupils: Pupils are equal, round, and reactive to light. Cardiovascular:      Rate and Rhythm: Normal rate. Pulses: Normal pulses. Pulmonary:      Effort: Pulmonary effort is normal.   Musculoskeletal:         General: Swelling and tenderness present. Cervical back: Normal range of motion. Comments:   Arthrodesis site clinically stable with diminished edema. No pain with palpation of arthrodesis site, overall good alignment noted. Diminishing edema and pain. Mild pain localized to lateral ankle/leg. Feet:      Right foot:      Protective Sensation: 10 sites tested. 10 sites sensed. Skin integrity: Skin integrity normal.      Left foot:      Protective Sensation: 10 sites tested. 10 sites sensed. Skin integrity: Skin integrity normal.   Skin:     General: Skin is warm and dry. Capillary Refill: Capillary refill takes 2 to 3 seconds. Comments: Incision coapted satisfactorily, sutures intact with no drainage within bandage. No signs of infection erythema calor or cellulitis noted. Neurological:      General: No focal deficit present. Mental Status: She is alert.    Psychiatric:         Mood and Affect: Mood normal.

## 2023-12-18 ENCOUNTER — EVALUATION (OUTPATIENT)
Dept: PHYSICAL THERAPY | Facility: CLINIC | Age: 58
End: 2023-12-18
Payer: COMMERCIAL

## 2023-12-18 DIAGNOSIS — Z98.890 POSTOPERATIVE STATE: ICD-10-CM

## 2023-12-18 DIAGNOSIS — Z98.1 STATUS POST ARTHRODESIS: ICD-10-CM

## 2023-12-18 DIAGNOSIS — M21.42 PES PLANUS OF LEFT FOOT: Primary | ICD-10-CM

## 2023-12-18 PROCEDURE — 97162 PT EVAL MOD COMPLEX 30 MIN: CPT | Performed by: PHYSICAL THERAPIST

## 2023-12-18 RX ORDER — MELOXICAM 7.5 MG/1
7.5 TABLET ORAL DAILY
COMMUNITY

## 2023-12-18 NOTE — PROGRESS NOTES
PT Evaluation     Today's date: 2023  Patient name: Capri Preston  : 1965  MRN: [id number]  Referring provider: Bart Boswell DPM  Dx:   Encounter Diagnosis     ICD-10-CM    1. Pes planus of left foot  M21.42 Ambulatory referral to Physical Therapy      2. Status post arthrodesis  Z98.1 Ambulatory referral to Physical Therapy      3. Postoperative state  Z98.890 Ambulatory referral to Physical Therapy                     Assessment  Assessment details: Pt is a 58 y.o. year old female coming to outpatient PT s/p L post tibialis repair on 23. Pt presents with increased pain and TTP, decreased ROM, decreased strength, and overall decreased functional mobility. Pt would benefit from skilled PT services in order to address these deficits and reach maximum level of function. Thank you kindly for the referral!    Pt was issued a written HEP to be performed on a daily basis.   Impairments: abnormal gait, abnormal or restricted ROM, activity intolerance, impaired physical strength, lacks appropriate home exercise program, pain with function and weight-bearing intolerance  Barriers to therapy: Pt wishes to be scheduled Monday and Wednesday only  Understanding of Dx/Px/POC: good   Prognosis: good    Goals  STG's ( 3-4 weeks)  1. Pt will be independent in HEP  2. Pt will have improved L ankle ROM to WFL's  LTG's ( 6- 8 weeks)  1. Improve FOTO score by 4-6 weeks  2. Pt will have decreased pain to 1/10 at worst  3. Pt will ambulate community distances without an assistive device  4. Pt will have less pain going up and down the steps        Plan  Patient would benefit from: skilled physical therapy  Planned modality interventions: cryotherapy  Planned therapy interventions: joint mobilization, manual therapy, neuromuscular re-education, stretching, strengthening, therapeutic activities, therapeutic exercise, functional ROM exercises, flexibility and home exercise program  Frequency: 2x week  Duration in  weeks: 6  Plan of Care beginning date: 2023  Plan of Care expiration date: 2024  Treatment plan discussed with: PTA and patient        Subjective Evaluation    History of Present Illness  Date of surgery: 2023  Mechanism of injury: surgery  Mechanism of injury: Pt underwent L foot talo-navicular arthodesis with tibialis posterior repair on 23. Pt was placed in a CAM boot NWB for 7 weeks, and is now permitted to be full weight bearing out of the boot. Pt has increased pain and difficulty walking, standing for long time period in the kitchen and going up and down the steps. Pt sits down in the kitchen if she needs a rest.  Pt has R ankle OA, which was aggravated after her surgery.  Pt denies pain when sleeping in her L foot.     Work: not currently working; prior   Hobbies: shopping  Gait: mild antalgic, decreased WB on L LE with SPC, slow speed  Patient Goals  Patient goals for therapy: decreased pain and independence with ADLs/IADLs  Patient goal: to be able to walk better  Pain  At best pain ratin  At worst pain ratin  Location: L foot  Quality: dull ache, tight and sharp    Social Support  Steps to enter house: yes (3)  Stairs in house: yes (15)   Lives in: multiple-level home  Lives with: adult children    Employment status: not working  Treatments  No previous or current treatments        Objective     Neurological Testing     Sensation     Hip   Left Hip   Diminished: light touch    Right Hip   Intact: light touch    Comments   Left light touch: numbness L lateral and anterior calf.     Reflexes   Left   Patellar (L4): normal (2+)  Achilles (S1): normal (2+)    Right   Patellar (L4): normal (2+)  Achilles (S1): normal (2+)    Active Range of Motion   Left Ankle/Foot   Dorsiflexion (ke): 5 degrees   Plantar flexion: 55 degrees   Inversion: 10 degrees   Eversion: 5 degrees     Right Ankle/Foot   Dorsiflexion (ke): WFL  Plantar flexion: WFL  Inversion: 0 degrees    Eversion: WFL    Additional Active Range of Motion Details  Moderate edema L ankle medial and lateral malleolar region  (+) TTP L lateral ankle, L achilles, sock line around L distal tibial region  (+) B pes planus    Passive Range of Motion   Left Ankle/Foot    Dorsiflexion (ke): 10 degrees   Plantar flexion: 55 degrees   Inversion: 15 degrees   Eversion: 15 degrees     Strength/Myotome Testing     Left Hip   Planes of Motion   Flexion: 4+  Extension: 4+  Abduction: 5  External rotation: 4+  Internal rotation: 4+    Right Hip   Planes of Motion   Flexion: 4+  Extension: 4+  Abduction: 5  External rotation: 4+  Internal rotation: 4+    Left Knee   Normal strength    Right Knee   Normal strength    Left Ankle/Foot   Dorsiflexion: 4+  Plantar flexion: 3+  Inversion: 4  Eversion: 4+    Right Ankle/Foot   Dorsiflexion: 5  Plantar flexion: 3+  Inversion: 4  Eversion: 4+           Dx: s/p L foot talo-navicular arthrodesis w/tibialis posterior repair ( 9/13/23)  EPOC: 1/29/24  CO-MORBIDITIES:   PERSONAL FACTORS:   Precautions: none      Manuals             L Ankle PROM             L Ankle / gastroc MFR                                       Neuro Re-Ed             Mini squat             sidestepping             Tandem walking             SLS                                                    Ther Ex             Bike for ankle ROM             4 way ankle tb ex             Standing HR             Standing gastroc stretch             Seated BAPS board             Invers/charlotte towel slides                                                                 Ther Activity             Step ups FW             Step ups lat             Gait Training                                       Modalities             Cp post tx -prn

## 2023-12-19 ENCOUNTER — APPOINTMENT (OUTPATIENT)
Dept: PHYSICAL THERAPY | Facility: CLINIC | Age: 58
End: 2023-12-19
Payer: COMMERCIAL

## 2023-12-20 ENCOUNTER — APPOINTMENT (OUTPATIENT)
Dept: PHYSICAL THERAPY | Facility: CLINIC | Age: 58
End: 2023-12-20
Payer: COMMERCIAL

## 2023-12-20 ENCOUNTER — OFFICE VISIT (OUTPATIENT)
Dept: PHYSICAL THERAPY | Facility: CLINIC | Age: 58
End: 2023-12-20
Payer: COMMERCIAL

## 2023-12-20 DIAGNOSIS — M21.42 PES PLANUS OF LEFT FOOT: Primary | ICD-10-CM

## 2023-12-20 DIAGNOSIS — Z98.890 POSTOPERATIVE STATE: ICD-10-CM

## 2023-12-20 DIAGNOSIS — Z98.1 STATUS POST ARTHRODESIS: ICD-10-CM

## 2023-12-20 PROCEDURE — 97140 MANUAL THERAPY 1/> REGIONS: CPT

## 2023-12-20 PROCEDURE — 97110 THERAPEUTIC EXERCISES: CPT

## 2023-12-20 NOTE — PROGRESS NOTES
"Daily Note     Today's date: 2023  Patient name: Capri Preston  : 1965  MRN: [id number]  Referring provider: Bart Boswell DPM  Dx:   Encounter Diagnosis     ICD-10-CM    1. Pes planus of left foot  M21.42       2. Postoperative state  Z98.890       3. Status post arthrodesis  Z98.1                      Subjective: Pt states she hasn't started her HEP yet.      Objective: See treatment diary below  FOTO given (_____)      Assessment: Pt arrived 15' late accommodated. Pt tolerated TE well, no complain of pain, introduced new TE upon nv as yusuf.      Plan: Continue per plan of care.      Dx: s/p L foot talo-navicular arthrodesis w/tibialis posterior repair ( 23)  EPOC: 24  CO-MORBIDITIES:   PERSONAL FACTORS:   Precautions: none      Manuals 12/20            L Ankle PROM 10'            L Ankle / gastroc MFR prn                                      Neuro Re-Ed             Mini squat             sidestepping             Tandem walking             SLS                                                    Ther Ex             Bike for ankle ROM L1 5'            4 way ankle tb ex Otb 20x ea            Standing HR 20x            Standing gastroc stretch             Seated BAPS board             Invers/charlotte towel slides                                                                 Ther Activity             Step ups FW L/R 4\" 10 ea            Step ups lat L/R  4\" 10 ea            Gait Training                                       Modalities             Cp post tx -prn 10'                                 "

## 2023-12-21 ENCOUNTER — APPOINTMENT (OUTPATIENT)
Dept: PHYSICAL THERAPY | Facility: CLINIC | Age: 58
End: 2023-12-21
Payer: COMMERCIAL

## 2023-12-26 ENCOUNTER — TELEPHONE (OUTPATIENT)
Age: 58
End: 2023-12-26

## 2023-12-26 NOTE — TELEPHONE ENCOUNTER
Caller: Capri Preston    Doctor: Elbert    Reason for call: Needs follow up appt.  She had surgery.  Can someone reach out to her?  Thank you.    Call back#: [phone]

## 2023-12-27 ENCOUNTER — APPOINTMENT (OUTPATIENT)
Dept: PHYSICAL THERAPY | Facility: CLINIC | Age: 58
End: 2023-12-27
Payer: COMMERCIAL

## 2023-12-28 ENCOUNTER — OFFICE VISIT (OUTPATIENT)
Dept: PHYSICAL THERAPY | Facility: CLINIC | Age: 58
End: 2023-12-28
Payer: COMMERCIAL

## 2023-12-28 DIAGNOSIS — M21.42 PES PLANUS OF LEFT FOOT: Primary | ICD-10-CM

## 2023-12-28 DIAGNOSIS — Z98.1 STATUS POST ARTHRODESIS: ICD-10-CM

## 2023-12-28 DIAGNOSIS — Z98.890 POSTOPERATIVE STATE: ICD-10-CM

## 2023-12-28 PROCEDURE — 97112 NEUROMUSCULAR REEDUCATION: CPT | Performed by: PHYSICAL THERAPIST

## 2023-12-28 PROCEDURE — 97530 THERAPEUTIC ACTIVITIES: CPT | Performed by: PHYSICAL THERAPIST

## 2023-12-28 PROCEDURE — 97110 THERAPEUTIC EXERCISES: CPT | Performed by: PHYSICAL THERAPIST

## 2023-12-28 NOTE — PROGRESS NOTES
"Daily Note     Today's date: 2023  Patient name: Capri Preston  : 1965  MRN: [id number]  Referring provider: Bart Boswell DPM  Dx:   Encounter Diagnosis     ICD-10-CM    1. Pes planus of left foot  M21.42       2. Postoperative state  Z98.890       3. Status post arthrodesis  Z98.1             Start Time: 1407  Stop Time: 1455  Total time in clinic (min): 48 minutes    Subjective: Pt states she is getting better      Objective: See treatment diary below  FOTO given (_____)      Assessment:  Progressed exercises as noted below; tolerated TE well, no complain of pain.  Patient will benefit from continued therapy.      Plan: Continue per plan of care.      Dx: s/p L foot talo-navicular arthrodesis w/tibialis posterior repair ( 23)  EPOC: 24  CO-MORBIDITIES:   PERSONAL FACTORS:   Precautions: none      Manuals            L Ankle PROM 10' JR           L Ankle / gastroc MFR prn                                      Neuro Re-Ed             Mini squat             sidestepping  3 laps           Tandem walking  3 laps           SLS                                                    Ther Ex             Bike for ankle ROM L1 5' 5min           4 way ankle tb ex Otb 20x ea OTB 20ea           Standing HR 20x 20x           Standing gastroc stretch  1 min ea           Seated BAPS board  DF/PF inv/ev   20ea L2           Invers/charlotte towel slides                                                                 Ther Activity             Step ups FW L/R 4\" 10 ea 4\" 15ea           Step ups lat L/R  4\" 10 ea 4\" 15ea           Gait Training                                       Modalities             Cp post tx -prn 10' 10'                                "

## 2024-01-03 ENCOUNTER — APPOINTMENT (OUTPATIENT)
Dept: PHYSICAL THERAPY | Facility: CLINIC | Age: 59
End: 2024-01-03
Payer: COMMERCIAL

## 2024-01-08 ENCOUNTER — OFFICE VISIT (OUTPATIENT)
Dept: PHYSICAL THERAPY | Facility: CLINIC | Age: 59
End: 2024-01-08
Payer: COMMERCIAL

## 2024-01-08 DIAGNOSIS — Z98.1 STATUS POST ARTHRODESIS: ICD-10-CM

## 2024-01-08 DIAGNOSIS — Z98.890 POSTOPERATIVE STATE: ICD-10-CM

## 2024-01-08 DIAGNOSIS — M21.42 PES PLANUS OF LEFT FOOT: Primary | ICD-10-CM

## 2024-01-08 PROCEDURE — 97110 THERAPEUTIC EXERCISES: CPT

## 2024-01-08 PROCEDURE — 97112 NEUROMUSCULAR REEDUCATION: CPT

## 2024-01-08 PROCEDURE — 97140 MANUAL THERAPY 1/> REGIONS: CPT

## 2024-01-08 NOTE — PROGRESS NOTES
"Daily Note     Today's date: 2024  Patient name: Capri Preston  : 1965  MRN: [id number]  Referring provider: Bart Boswell DPM  Dx:   Encounter Diagnosis     ICD-10-CM    1. Pes planus of left foot  M21.42       2. Postoperative state  Z98.890       3. Status post arthrodesis  Z98.1                        Subjective: Pt states she is getting better      Objective: See treatment diary below  FOTO given (_____)      Assessment:  Pt performed below TE with great tolerance and technique.  Patient will benefit from continued therapy.      Plan: Continue per plan of care.      Dx: s/p L foot talo-navicular arthrodesis w/tibialis posterior repair ( 23)  EPOC: 24  CO-MORBIDITIES:   PERSONAL FACTORS:   Precautions: none      Manuals 8          L Ankle PROM 10' JR wdc          L Ankle / gastroc MFR prn                         Total Time:    8'          Neuro Re-Ed             Mini squat             sidestepping  3 laps 3laps          Tandem walking  3 laps 3laps          SLS                                                    Ther Ex             Bike for ankle ROM L1 5' 5min L1 6'          4 way ankle tb ex Otb 20x ea OTB 20ea GTB 20 ea          Standing HR 20x 20x 20          Standing gastroc stretch L/R  1 min ea 20\" 3 ea          Seated BAPS board  DF/PF inv/ev   20ea L2 DF/PF inv/ev   20ea L3          Invers/charlotte towel slides                                                                 Ther Activity             Step ups FW L/R 4\" 10 ea 4\" 15ea 4\" 15 ea          Step ups lat L/R  4\" 10 ea 4\" 15ea 4\" 15 ea          Gait Training                                       Modalities             Cp post tx -prn 10' 10' 10'                               "

## 2024-01-09 ENCOUNTER — OFFICE VISIT (OUTPATIENT)
Dept: PODIATRY | Facility: CLINIC | Age: 59
End: 2024-01-09
Payer: COMMERCIAL

## 2024-01-09 ENCOUNTER — APPOINTMENT (OUTPATIENT)
Dept: RADIOLOGY | Facility: CLINIC | Age: 59
End: 2024-01-09
Payer: COMMERCIAL

## 2024-01-09 VITALS
SYSTOLIC BLOOD PRESSURE: 120 MMHG | BODY MASS INDEX: 30.82 KG/M2 | WEIGHT: 157 LBS | HEIGHT: 60 IN | DIASTOLIC BLOOD PRESSURE: 74 MMHG

## 2024-01-09 DIAGNOSIS — M25.572 CHRONIC PAIN OF LEFT ANKLE: ICD-10-CM

## 2024-01-09 DIAGNOSIS — G89.29 CHRONIC PAIN OF LEFT ANKLE: ICD-10-CM

## 2024-01-09 DIAGNOSIS — M21.42 PES PLANUS OF LEFT FOOT: Primary | ICD-10-CM

## 2024-01-09 DIAGNOSIS — M21.42 PES PLANUS OF LEFT FOOT: ICD-10-CM

## 2024-01-09 PROCEDURE — 73630 X-RAY EXAM OF FOOT: CPT

## 2024-01-09 PROCEDURE — 73610 X-RAY EXAM OF ANKLE: CPT

## 2024-01-09 PROCEDURE — 20550 NJX 1 TENDON SHEATH/LIGAMENT: CPT | Performed by: PODIATRIST

## 2024-01-09 NOTE — PROGRESS NOTES
Assessment/Plan:   Foot and ankle x-rays reviewed, arthrodesis site clinically stable with intact hardware, some bone healing evident.  Lateral ankle region where patient's tenderness is located did not show any degenerative arthritis or fracture.  Discussed treatment options with patient consisting of anti-inflammatories or injection with corticosteroid for the lateral ankle pain.  Patient consents to corticosteroid injection.  Corticosteroid injection as noted below.  Patient instructed to go home and ice rest her foot/ankle for the next 6 to 8 hours.  Follow-up in 6 weeks.     Diagnoses and all orders for this visit:    Pes planus of left foot  -     X-ray foot left 3+ views; Future    Chronic pain of left ankle  -     X-ray ankle left 3+ views; Future  -     Foot/lower extremity injection  -     lidocaine (XYLOCAINE) 1 % injection 1 mL  -     bupivacaine (MARCAINE) 0.5 % injection 1 mL  -     dexamethasone (DECADRON) injection 2 mg  -     triamcinolone acetonide (KENALOG-40) 40 mg/mL injection 10 mg        Foot/lower extremity injection    Performed by: Bart Boswell DPM  Authorized by: Bart Boswell DPM    Procedure:     Other Assisting Provider: No      Verbal consent obtained?: Yes      Risks and benefits: Risks, benefits and alternatives were discussed      Consent given by:  Patient    Patient states understanding of procedure being performed: Yes      Patient identity confirmed:  Verbally with patient    Supporting Documentation:     Indications:  Pain and therapeutic    Procedure Details:    Prep: patient was prepped and draped in usual sterile fashion                Ethyl Chloride was applied      Needle size: 25 G G    Ultrasound Guidance: no      Approach:  Lateral    Laterality:  Left    Cyst Aspiration/Injection: No      Location: ligament      Ligament(s) Injected:  Left Ankle & Foot:  ATFL and Sinus Tarsi    Injection Information:       Patient tolerance:  Patient tolerated the procedure well  with no immediate complications    Dressing: sterile dressing applied         Subjective:     Patient ID: Capri Preston is a 58 y.o. female.    1/9/2024: 58-year-old female seen today for follow-up status post arthrodesis left TN joint.  Reports continued pain and discomfort from her lateral ankle which physical therapy has only had limited success at helping.  Otherwise she claims that the surgical site is not painful or giving her any trouble.    11/6/2023: 57-year-old female seen today for follow-up status post arthrodesis left talonavicular joint.  Surgery was performed on 9/13/2023.    9/25/2023: 57-year-old female seen today for POV#3 S/P arthrodesis left foot T-N joint with primary repair of posterior tibial tendon.  Reports diminishing pain and overall but did not feel splint was very comfortable.    9/21/2023: 57-year-old female seen for follow-up concerned with discoloration of toes.  Reports she is going to need more pain medication.  States pain is getting better but still has episodes of pain.  Denies any new pain or shortness of breath or calf pain.    9/15/2023: 57-year-old female seen today for follow-up status post arthrodesis left talonavicular joint.  Patient called and reports strikethrough bloody drainage of a moderate nature.  Evaluating patient plantar forefoot area of the splint was noted to be soiled consistent with weightbearing on the foot contrary to instructions for complete nonweightbearing.  When questioned patient also admitted to walking on foot because she needed to get to the bathroom and also removing the splint and Ace wrap's.    7/24/2023: 57-year-old female presents with continued pain/discomfort which has been increasing in her left rear foot.  Recently completed her left knee surgery and now would like to have something done with her left ankle which seems to have more deformity now than previously noted.  Patient reports pain daily that limits her walking.  She had a  similar procedure that was done on her right foot several years ago to address the same issue.    1/31/2023: 57-year-old female presents with MRI disc/report for left rear foot done in [facility], documents rupture of left posterior tibial tendon where she has most of her pain/discomfort in her left foot.  Secondary concern of dropping a glass window on top of both feet at the same time 3 weeks ago which has resulted in some pain on the top of both feet.  Reports also having surgery on her right foot for a tendon rupture in the past and has some retained hardware.  Patient is scheduled to have a total knee replacement on the left side within the next 4 to 6 weeks with Dr. Moore.        Review of Systems   Constitutional: Negative.    HENT: Negative.     Eyes: Negative.    Respiratory: Negative.     Cardiovascular: Negative.    Gastrointestinal: Negative.    Endocrine: Negative.    Genitourinary: Negative.    Musculoskeletal:         Lateral left rearfoot pain   Skin:         Surgical incision medial left rear foot   Allergic/Immunologic: Negative.    Neurological: Negative.    Hematological: Negative.    Psychiatric/Behavioral: Negative.           Objective:     Physical Exam  Constitutional:       Appearance: Normal appearance.   HENT:      Head: Normocephalic.      Right Ear: External ear normal.      Left Ear: External ear normal.   Eyes:      Pupils: Pupils are equal, round, and reactive to light.   Cardiovascular:      Rate and Rhythm: Normal rate.      Pulses: Normal pulses.   Pulmonary:      Effort: Pulmonary effort is normal.   Musculoskeletal:         General: Swelling and tenderness present.      Cervical back: Normal range of motion.      Comments:   Arthrodesis site clinically stable, minimal edema.  No pain and discomfort from surgical site.    Lateral ankle pain which she reports was aggravated by the cam boot.  Pain localized to the anterior talofibular ligament region into the sinus tarsi area.   Peroneal tendons slightly tender.   Feet:      Right foot:      Protective Sensation: 10 sites tested.  10 sites sensed.      Skin integrity: Skin integrity normal.      Left foot:      Protective Sensation: 10 sites tested.  10 sites sensed.      Skin integrity: Skin integrity normal.   Skin:     General: Skin is warm and dry.      Capillary Refill: Capillary refill takes 2 to 3 seconds.      Comments: Incision coapted satisfactorily, sutures intact with no drainage within bandage.  No signs of infection erythema calor or cellulitis noted.   Neurological:      General: No focal deficit present.      Mental Status: She is alert.   Psychiatric:         Mood and Affect: Mood normal.

## 2024-01-10 ENCOUNTER — APPOINTMENT (OUTPATIENT)
Dept: PHYSICAL THERAPY | Facility: CLINIC | Age: 59
End: 2024-01-10
Payer: COMMERCIAL

## 2024-01-11 ENCOUNTER — OFFICE VISIT (OUTPATIENT)
Dept: PHYSICAL THERAPY | Facility: CLINIC | Age: 59
End: 2024-01-11
Payer: COMMERCIAL

## 2024-01-11 DIAGNOSIS — M21.42 PES PLANUS OF LEFT FOOT: Primary | ICD-10-CM

## 2024-01-11 DIAGNOSIS — Z98.1 STATUS POST ARTHRODESIS: ICD-10-CM

## 2024-01-11 DIAGNOSIS — Z98.890 POSTOPERATIVE STATE: ICD-10-CM

## 2024-01-11 PROCEDURE — 97140 MANUAL THERAPY 1/> REGIONS: CPT

## 2024-01-11 PROCEDURE — 97110 THERAPEUTIC EXERCISES: CPT

## 2024-01-11 PROCEDURE — 97112 NEUROMUSCULAR REEDUCATION: CPT

## 2024-01-11 RX ORDER — DEXAMETHASONE SODIUM PHOSPHATE 4 MG/ML
2 INJECTION, SOLUTION INTRA-ARTICULAR; INTRALESIONAL; INTRAMUSCULAR; INTRAVENOUS; SOFT TISSUE ONCE
Status: SHIPPED | OUTPATIENT
Start: 2024-01-09

## 2024-01-11 RX ORDER — BUPIVACAINE HYDROCHLORIDE 5 MG/ML
1 INJECTION, SOLUTION PERINEURAL ONCE
Status: SHIPPED | OUTPATIENT
Start: 2024-01-09

## 2024-01-11 RX ORDER — LIDOCAINE HYDROCHLORIDE 10 MG/ML
1 INJECTION, SOLUTION INFILTRATION; PERINEURAL ONCE
Status: SHIPPED | OUTPATIENT
Start: 2024-01-09

## 2024-01-11 RX ORDER — TRIAMCINOLONE ACETONIDE 40 MG/ML
10 INJECTION, SUSPENSION INTRA-ARTICULAR; INTRAMUSCULAR ONCE
Status: SHIPPED | OUTPATIENT
Start: 2024-01-09

## 2024-01-11 NOTE — PROGRESS NOTES
"Daily Note     Today's date: 2024  Patient name: Capri Preston  : 1965  MRN: [id number]  Referring provider: Bart Boswell DPM  Dx:   Encounter Diagnosis     ICD-10-CM    1. Pes planus of left foot  M21.42       2. Postoperative state  Z98.890       3. Status post arthrodesis  Z98.1                        Subjective: Pt states she is getting better      Objective: See treatment diary below  FOTO given (_____)      Assessment:  Pt performed below TE with great tolerance and technique.  Patient will benefit from continued therapy.      Plan: Continue per plan of care.      Dx: s/p L foot talo-navicular arthrodesis w/tibialis posterior repair ( 23)  EPOC: 24  CO-MORBIDITIES:   PERSONAL FACTORS:   Precautions: none      Manuals          L Ankle PROM 10' JR wdc wdc         L Ankle / gastroc MFR prn                         Total Time:    8' 8'                      Neuro Re-Ed             Mini squat             sidestepping  3 laps 3laps Foam 3laps         Tandem walking  3 laps 3laps Foam 3laps         SLS                                                    Ther Ex             Bike for ankle ROM L1 5' 5min L1 6' L1 6'         4 way ankle tb ex Otb 20x ea OTB 20ea GTB 20 ea GTB 20 ea         Standing HR 20x 20x 20 20         Standing gastroc stretch L/R  1 min ea 20\" 3 ea 20\" 3 ea         Seated BAPS board  DF/PF inv/ev   20ea L2 DF/PF inv/ev   20ea L3 DF/PF inv/ev   20ea L3         I                                                                 Ther Activity             Step Down L/R    4\" 10 nv?         Step ups FW L/R 4\" 10 ea 4\" 15ea 4\" 15 ea 8\" 20         Step ups lat L/R  4\" 10 ea 4\" 15ea 4\" 15 ea 8\" 20         Gait Training                                       Modalities             Cp post tx -prn 10' 10' 10' def                              "

## 2024-01-15 ENCOUNTER — OFFICE VISIT (OUTPATIENT)
Dept: PHYSICAL THERAPY | Facility: CLINIC | Age: 59
End: 2024-01-15
Payer: COMMERCIAL

## 2024-01-15 DIAGNOSIS — M21.42 PES PLANUS OF LEFT FOOT: Primary | ICD-10-CM

## 2024-01-15 DIAGNOSIS — Z98.890 POSTOPERATIVE STATE: ICD-10-CM

## 2024-01-15 DIAGNOSIS — Z98.1 STATUS POST ARTHRODESIS: ICD-10-CM

## 2024-01-15 PROCEDURE — 97140 MANUAL THERAPY 1/> REGIONS: CPT

## 2024-01-15 PROCEDURE — 97110 THERAPEUTIC EXERCISES: CPT

## 2024-01-15 PROCEDURE — 97112 NEUROMUSCULAR REEDUCATION: CPT

## 2024-01-15 NOTE — PROGRESS NOTES
"Daily Note     Today's date: 1/15/2024  Patient name: Capri Preston  : 1965  MRN: [id number]  Referring provider: Bart Boswell DPM  Dx:   Encounter Diagnosis     ICD-10-CM    1. Pes planus of left foot  M21.42       2. Postoperative state  Z98.890       3. Status post arthrodesis  Z98.1                        Subjective: Pt states she is getting better      Objective: See treatment diary below  FOTO given (_____)      Assessment:  Pt performed below TE with great tolerance and technique.  Patient will benefit from continued therapy.      Plan: Continue per plan of care.      Dx: s/p L foot talo-navicular arthrodesis w/tibialis posterior repair ( 23)  EPOC: 24  CO-MORBIDITIES:   PERSONAL FACTORS:   Precautions: none      Manuals 12/20 12/28 1/8 1/11 1/15        L Ankle PROM 10' JR wdc wdc wdc        L Ankle / gastroc MFR prn                         Total Time:    8' 8' 8'                     Neuro Re-Ed             Mini squat             sidestepping  3 laps 3laps Foam 3laps Foam 3laps  No UE         Tandem walking  3 laps 3laps Foam 3laps Foam 3laps No UE        SLS     10\" 5                                               Ther Ex             Bike for ankle ROM L1 5' 5min L1 6' L1 6' L1 6'        4 way ankle tb ex Otb 20x ea OTB 20ea GTB 20 ea GTB 20 ea GTB 20 ea        Standing HR 20x 20x 20 20 20        Standing gastroc stretch L/R  1 min ea 20\" 3 ea 20\" 3 ea 20\" 3 ea        Seated BAPS board  DF/PF inv/ev   20ea L2 DF/PF inv/ev   20ea L3 DF/PF inv/ev   20ea L3 DF/PF inv/ev   20ea L3                                                                         Ther Activity             Step Down L/R    4\" 10 nv? 8\" 20        Step ups FW L/R 4\" 10 ea 4\" 15ea 4\" 15 ea 8\" 20 8\" 20        Step ups lat L/R  4\" 10 ea 4\" 15ea 4\" 15 ea 8\" 20 8\" 20        Gait Training                                       Modalities             Cp post tx -prn 10' 10' 10' def def                             "

## 2024-01-17 ENCOUNTER — APPOINTMENT (OUTPATIENT)
Dept: PHYSICAL THERAPY | Facility: CLINIC | Age: 59
End: 2024-01-17
Payer: COMMERCIAL

## 2024-01-22 ENCOUNTER — OFFICE VISIT (OUTPATIENT)
Dept: PHYSICAL THERAPY | Facility: CLINIC | Age: 59
End: 2024-01-22
Payer: COMMERCIAL

## 2024-01-22 DIAGNOSIS — Z98.890 POSTOPERATIVE STATE: ICD-10-CM

## 2024-01-22 DIAGNOSIS — M21.42 PES PLANUS OF LEFT FOOT: Primary | ICD-10-CM

## 2024-01-22 PROCEDURE — 97112 NEUROMUSCULAR REEDUCATION: CPT

## 2024-01-22 PROCEDURE — 97140 MANUAL THERAPY 1/> REGIONS: CPT

## 2024-01-22 PROCEDURE — 97110 THERAPEUTIC EXERCISES: CPT

## 2024-01-22 NOTE — PROGRESS NOTES
"Daily Note     Today's date: 2024  Patient name: Capri Preston  : 1965  MRN: [id number]  Referring provider: Bart Boswell DPM  Dx:   Encounter Diagnosis     ICD-10-CM    1. Pes planus of left foot  M21.42       2. Postoperative state  Z98.890                        Subjective: Pt reports continued progress      Objective: See treatment diary below  FOTO given (_____)      Assessment:  Pt is progressing well. She shows good ROM in all directions except significant tightness with inversion still. She has been able to perform TE's with a lot less pain. Pt       Plan: Continue per plan of care.      Dx: s/p L foot talo-navicular arthrodesis w/tibialis posterior repair ( 23)  EPOC: 24  CO-MORBIDITIES:   PERSONAL FACTORS:   Precautions: none      Manuals 12/20 12/28 1/8 1/11 1/15 1/22       L Ankle PROM 10' JR wdc wdc wdc WE       L Ankle / gastroc MFR prn                         Total Time:    8' 8' 8' 8'                    Neuro Re-Ed             Mini squat             sidestepping  3 laps 3laps Foam 3laps Foam 3laps  No UE  Foam 3laps  No UE        Tandem walking  3 laps 3laps Foam 3laps Foam 3laps No UE Foam 3laps  No UE        SLS     10\" 5 10\"x5                                              Ther Ex             Bike for ankle ROM L1 5' 5min L1 6' L1 6' L1 6' L1 6'       4 way ankle tb ex Otb 20x ea OTB 20ea GTB 20 ea GTB 20 ea GTB 20 ea GTB 20 ea       Standing HR 20x 20x 20 20 20 20x       Standing gastroc stretch L/R  1 min ea 20\" 3 ea 20\" 3 ea 20\" 3 ea 20\" 3 ea       Seated BAPS board  DF/PF inv/ev   20ea L2 DF/PF inv/ev   20ea L3 DF/PF inv/ev   20ea L3 DF/PF inv/ev   20ea L3 DF/PF inv/ev   20ea L3                                                                        Ther Activity             Step Down L/R    4\" 10 nv? 8\" 20 8\" 20       Step ups FW L/R 4\" 10 ea 4\" 15ea 4\" 15 ea 8\" 20 8\" 20 8\" 20       Step ups lat L/R  4\" 10 ea 4\" 15ea 4\" 15 ea 8\" 20 8\" 20 8\" 20       Gait Training   "                                     Modalities             Cp post tx -prn 10' 10' 10' def def

## 2024-01-24 ENCOUNTER — EVALUATION (OUTPATIENT)
Dept: PHYSICAL THERAPY | Facility: CLINIC | Age: 59
End: 2024-01-24
Payer: COMMERCIAL

## 2024-01-24 DIAGNOSIS — Z98.1 STATUS POST ARTHRODESIS: ICD-10-CM

## 2024-01-24 DIAGNOSIS — Z98.890 POSTOPERATIVE STATE: ICD-10-CM

## 2024-01-24 DIAGNOSIS — M21.42 PES PLANUS OF LEFT FOOT: Primary | ICD-10-CM

## 2024-01-24 PROCEDURE — 97110 THERAPEUTIC EXERCISES: CPT | Performed by: PHYSICAL THERAPIST

## 2024-01-24 PROCEDURE — 97140 MANUAL THERAPY 1/> REGIONS: CPT | Performed by: PHYSICAL THERAPIST

## 2024-01-24 PROCEDURE — 97112 NEUROMUSCULAR REEDUCATION: CPT | Performed by: PHYSICAL THERAPIST

## 2024-01-24 NOTE — PROGRESS NOTES
PT Evaluation     Today's date: 2024  Patient name: Capri Preston  : 1965  MRN: [id number]  Referring provider: Bart Boswell DPM  Dx:   Encounter Diagnosis     ICD-10-CM    1. Pes planus of left foot  M21.42       2. Postoperative state  Z98.890       3. Status post arthrodesis  Z98.1                      Assessment  Assessment details: Since starting skilled PT, pain levels are decreased, L ankle ROM and strength are improving with gradual functional progress. Recommend pt continue skilled PT focusing on improving strength and ROM.  Impairments: abnormal gait, abnormal or restricted ROM, activity intolerance, impaired physical strength, lacks appropriate home exercise program, pain with function and weight-bearing intolerance  Barriers to therapy: Pt wishes to be scheduled Monday and Wednesday only  Understanding of Dx/Px/POC: good   Prognosis: good    Goals  STG's ( 3-4 weeks)  1. Pt will be independent in HEP- met  2. Pt will have improved L ankle ROM to WFL's- met  LTG's ( 6- 8 weeks)  1. Improve FOTO score by 4-6 weeks-met  2. Pt will have decreased pain to 1/10 at worst- not met  3. Pt will ambulate community distances without an assistive device- met  4. Pt will have less pain going up and down the steps-partial met        Plan  Patient would benefit from: skilled physical therapy  Planned modality interventions: cryotherapy  Planned therapy interventions: joint mobilization, manual therapy, neuromuscular re-education, stretching, strengthening, therapeutic activities, therapeutic exercise, functional ROM exercises, flexibility and home exercise program  Frequency: 2x week  Duration in weeks: 6  Plan of Care beginning date: 2024  Plan of Care expiration date: 3/6/2024  Treatment plan discussed with: PTA and patient        Subjective Evaluation    History of Present Illness  Date of surgery: 2023  Mechanism of injury: surgery  Mechanism of injury: I.E; Pt underwent L foot  talo-navicular arthodesis with tibialis posterior repair on 23. Pt was placed in a CAM boot NWB for 7 weeks, and is now permitted to be full weight bearing out of the boot. Pt has increased pain and difficulty walking, standing for long time period in the kitchen and going up and down the steps. Pt sits down in the kitchen if she needs a rest.  Pt has R ankle OA, which was aggravated after her surgery.  Pt denies pain when sleeping in her L foot.     24; Pt is able to stand for longer time periods when in the kitchen to make meals. Pt is able to tolerate walking community distances. Pt is able to go up and down the steps reciprocally, but she still has some difficulty descending the steps.     Work: not currently working; prior   Hobbies: shopping  Gait: normal gait pattern  Patient Goals  Patient goals for therapy: decreased pain and independence with ADLs/IADLs  Patient goal: to be able to walk better- partial met  Pain  At best pain ratin  At worst pain ratin  Location: L foot  Quality: dull ache, tight and sharp    Social Support  Steps to enter house: yes (3)  Stairs in house: yes (15)   Lives in: multiple-level home  Lives with: adult children    Employment status: not working  Treatments  No previous or current treatments        Objective     Neurological Testing     Sensation     Hip   Left Hip   Diminished: light touch    Right Hip   Intact: light touch    Comments   Left light touch: numbness L lateral and anterior calf.     Reflexes   Left   Patellar (L4): normal (2+)  Achilles (S1): normal (2+)    Right   Patellar (L4): normal (2+)  Achilles (S1): normal (2+)    Active Range of Motion   Left Ankle/Foot   Dorsiflexion (ke): 10 degrees   Plantar flexion: 55 degrees   Inversion: 30 degrees   Eversion: 20 degrees     Right Ankle/Foot   Dorsiflexion (ke): WFL  Plantar flexion: WFL  Inversion: 0 degrees   Eversion: WFL    Additional Active Range of Motion Details  Moderate edema  "L ankle medial and lateral malleolar region  (+) TTP L lateral ankle, L achilles, sock line around L distal tibial region  (+) B pes planus    Passive Range of Motion   Left Ankle/Foot    Dorsiflexion (ke): 12 degrees   Plantar flexion: 55 degrees   Inversion: 30 degrees   Eversion: 20 degrees     Strength/Myotome Testing     Left Hip   Planes of Motion   Flexion: 4+  Extension: 4+  Abduction: 5  External rotation: 4+  Internal rotation: 4+    Right Hip   Planes of Motion   Flexion: 4+  Extension: 4+  Abduction: 5  External rotation: 4+  Internal rotation: 4+    Left Knee   Normal strength    Right Knee   Normal strength    Left Ankle/Foot   Dorsiflexion: 5  Plantar flexion: 3+  Inversion: 4+  Eversion: 5    Right Ankle/Foot   Dorsiflexion: 5  Plantar flexion: 3+  Inversion: 4  Eversion: 4+      Dx: s/p L foot talo-navicular arthrodesis w/tibialis posterior repair ( 9/13/23)  EPOC: 1/29/24  CO-MORBIDITIES:   PERSONAL FACTORS:   Precautions: none      Manuals 12/20 12/28 1/8 1/11 1/15 1/22 1/24      L Ankle PROM 10' JR wdc wdc wdc WE 5'      L Ankle / gastroc MFR prn      5'      Progress note       5'      Total Time:    8' 8' 8' 8' 15'                   Neuro Re-Ed             Mini squat       10x5\" airex      sidestepping  3 laps 3laps Foam 3laps Foam 3laps  No UE  Foam 3laps  No UE  Foam 3 laps no UE      Tandem walking  3 laps 3laps Foam 3laps Foam 3laps No UE Foam 3laps  No UE  Foam 3 laps      SLS     10\" 5 10\"x5 15\"x3                                             Ther Ex             Bike for ankle ROM L1 5' 5min L1 6' L1 6' L1 6' L1 6' 6' L1      4 way ankle tb ex Otb 20x ea OTB 20ea GTB 20 ea GTB 20 ea GTB 20 ea GTB 20 ea GTB x20       Standing HR 20x 20x 20 20 20 20x 10 slt bd      Standing gastroc stretch L/R  1 min ea 20\" 3 ea 20\" 3 ea 20\" 3 ea 20\" 3 ea 1' slt bd      Seated BAPS board  DF/PF inv/ev   20ea L2 DF/PF inv/ev   20ea L3 DF/PF inv/ev   20ea L3 DF/PF inv/ev   20ea L3 DF/PF inv/ev   20ea L3     " "  1/2 kneeling stretch: knee over 2nd MT                                                                 Ther Activity             Lat dip       4\"x10      Step Down L/R    4\" 10 nv? 8\" 20 8\" 20 6\"x10 step over // bars      Step ups FW L/R 4\" 10 ea 4\" 15ea 4\" 15 ea 8\" 20 8\" 20 8\" 20       Step ups lat L/R  4\" 10 ea 4\" 15ea 4\" 15 ea 8\" 20 8\" 20 8\" 20       Gait Training                                       Modalities             Cp post tx -prn 10' 10' 10' def def                             "

## 2024-01-30 ENCOUNTER — OFFICE VISIT (OUTPATIENT)
Dept: PHYSICAL THERAPY | Facility: CLINIC | Age: 59
End: 2024-01-30
Payer: COMMERCIAL

## 2024-01-30 DIAGNOSIS — M21.42 PES PLANUS OF LEFT FOOT: Primary | ICD-10-CM

## 2024-01-30 DIAGNOSIS — Z98.890 POSTOPERATIVE STATE: ICD-10-CM

## 2024-01-30 DIAGNOSIS — Z98.1 STATUS POST ARTHRODESIS: ICD-10-CM

## 2024-01-30 PROCEDURE — 97140 MANUAL THERAPY 1/> REGIONS: CPT

## 2024-01-30 PROCEDURE — 97110 THERAPEUTIC EXERCISES: CPT

## 2024-01-30 NOTE — PROGRESS NOTES
"Daily Note     Today's date: 2024  Patient name: Capri Preston  : 1965  MRN: [id number]  Referring provider: Bart Boswell DPM  Dx:   Encounter Diagnosis     ICD-10-CM    1. Pes planus of left foot  M21.42       2. Postoperative state  Z98.890       3. Status post arthrodesis  Z98.1                      Subjective: Patient feels the exercises are very helpful because she notices better ability to go up and down the stairs      Objective: See treatment diary below      Assessment: Patient tolerated treatment well overall. Note decreased inversion and eversion strength as observed with TB ankle exercises. She is challenged by balance activities on uneven surfaces. Will benefit from skilled PT to address impairments and return to PLOF      Plan: progress per POC     Dx: s/p L foot talo-navicular arthrodesis w/tibialis posterior repair ( 23)  EPOC: 24  CO-MORBIDITIES:   PERSONAL FACTORS:   Precautions: none         Manuals 12/20 12/28 1/8 1/11 1/15 1/22 1/24 1/30     L Ankle PROM 10' JR wdc wdc wdc WE 5' 5'     L Ankle / gastroc MFR prn      5' 5'     Progress note       5'      Total Time:    8' 8' 8' 8' 15'                   Neuro Re-Ed             Mini squat       10x5\" airex 15x5\"     sidestepping  3 laps 3laps Foam 3laps Foam 3laps  No UE  Foam 3laps  No UE  Foam 3 laps no UE Foam 3 laps no UE     Tandem walking  3 laps 3laps Foam 3laps Foam 3laps No UE Foam 3laps  No UE  Foam 3 laps Foam 3 laps     SLS     10\" 5 10\"x5 15\"x3 15\"x3                                            Ther Ex             Bike for ankle ROM L1 5' 5min L1 6' L1 6' L1 6' L1 6' 6' L1 6' L1     4 way ankle tb ex Otb 20x ea OTB 20ea GTB 20 ea GTB 20 ea GTB 20 ea GTB 20 ea GTB x20  GTB x20     Standing HR 20x 20x 20 20 20 20x 10 slt bd 20 slt brd     Standing gastroc stretch L/R  1 min ea 20\" 3 ea 20\" 3 ea 20\" 3 ea 20\" 3 ea 1' slt bd 1 slt bd     Seated BAPS board  DF/PF inv/ev   20ea L2 DF/PF inv/ev   20ea L3 DF/PF " "inv/ev   20ea L3 DF/PF inv/ev   20ea L3 DF/PF inv/ev   20ea L3  DF/PF inv/ev   20ea L3     1/2 kneeling stretch: knee over 2nd MT                                                                 Ther Activity             Lat dip       4\"x10      Step Down L/R    4\" 10 nv? 8\" 20 8\" 20 6\"x10 step over // bars 6\"x10 step over // bars     Step ups FW L/R 4\" 10 ea 4\" 15ea 4\" 15 ea 8\" 20 8\" 20 8\" 20       Step ups lat L/R  4\" 10 ea 4\" 15ea 4\" 15 ea 8\" 20 8\" 20 8\" 20       Gait Training                                       Modalities             Cp post tx -prn 10' 10' 10' def def                              "

## 2024-02-06 ENCOUNTER — APPOINTMENT (OUTPATIENT)
Dept: PHYSICAL THERAPY | Facility: CLINIC | Age: 59
End: 2024-02-06
Payer: COMMERCIAL

## 2024-02-07 ENCOUNTER — APPOINTMENT (OUTPATIENT)
Dept: PHYSICAL THERAPY | Facility: CLINIC | Age: 59
End: 2024-02-07
Payer: COMMERCIAL

## 2024-02-12 ENCOUNTER — EVALUATION (OUTPATIENT)
Dept: PHYSICAL THERAPY | Facility: CLINIC | Age: 59
End: 2024-02-12
Payer: COMMERCIAL

## 2024-02-12 DIAGNOSIS — Z98.890 POSTOPERATIVE STATE: ICD-10-CM

## 2024-02-12 DIAGNOSIS — M21.42 PES PLANUS OF LEFT FOOT: Primary | ICD-10-CM

## 2024-02-12 DIAGNOSIS — Z98.1 STATUS POST ARTHRODESIS: ICD-10-CM

## 2024-02-12 PROCEDURE — 97110 THERAPEUTIC EXERCISES: CPT | Performed by: PHYSICAL THERAPIST

## 2024-02-12 PROCEDURE — 97140 MANUAL THERAPY 1/> REGIONS: CPT | Performed by: PHYSICAL THERAPIST

## 2024-02-12 NOTE — PROGRESS NOTES
"Daily Note     Today's date: 2024  Patient name: Capri Preston  : 1965  MRN: [id number]  Referring provider: Bart Boswell DPM  Dx:   Encounter Diagnosis     ICD-10-CM    1. Pes planus of left foot  M21.42       2. Postoperative state  Z98.890       3. Status post arthrodesis  Z98.1                      Subjective:  Pt reports her foot feels a little sore today 2* doing a lot of walking yesterday when shopping with her daughter. Pt went to [employer]. Pt notes she did pretty well walking yesterday. Pt arrived 10' late to apt.      Objective: See treatment diary below      Assessment: Tolerated treatment well. Patient exhibited good technique with therapeutic exercises Modified tx program performed 2* pt arriving 10' late to apt.      Plan: Continue per plan of care. Focus on progressing strengthening and proprioceptive ex.     Dx: s/p L foot talo-navicular arthrodesis w/tibialis posterior repair ( 23)  EPOC: 24  CO-MORBIDITIES:   PERSONAL FACTORS:   Precautions: none         Manuals 12/20 12/28 1/8 1/11 1/15 1/22 1/24 1/30 2/12    L Ankle PROM 10' JR wdc wdc wdc WE 5' 5' 3'    L Ankle / gastroc MFR prn      5' 5' 5'    Progress note       5'      Total Time:    8' 8' 8' 8' 15'  8'                 Neuro Re-Ed             Mini squat       10x5\" airex 15x5\" 10x5\" airex    sidestepping  3 laps 3laps Foam 3laps Foam 3laps  No UE  Foam 3laps  No UE  Foam 3 laps no UE Foam 3 laps no UE Foam 3 laps no UE    Tandem walking  3 laps 3laps Foam 3laps Foam 3laps No UE Foam 3laps  No UE  Foam 3 laps Foam 3 laps Foam 3 laps    SLS     10\" 5 10\"x5 15\"x3 15\"x3 15\"x3                                           Ther Ex             Bike for ankle ROM L1 5' 5min L1 6' L1 6' L1 6' L1 6' 6' L1 6' L1 6'     4 way ankle tb ex Otb 20x ea OTB 20ea GTB 20 ea GTB 20 ea GTB 20 ea GTB 20 ea GTB x20  GTB x20 GTB x20     Standing HR 20x 20x 20 20 20 20x 10 slt bd 20 slt brd 20 slt bd    Standing gastroc " "stretch L/R  1 min ea 20\" 3 ea 20\" 3 ea 20\" 3 ea 20\" 3 ea 1' slt bd 1 slt bd 1' slt bd    Seated BAPS board  DF/PF inv/ev   20ea L2 DF/PF inv/ev   20ea L3 DF/PF inv/ev   20ea L3 DF/PF inv/ev   20ea L3 DF/PF inv/ev   20ea L3  DF/PF inv/ev   20ea L3     1/2 kneeling stretch: knee over 2nd MT                                                                 Ther Activity             Lat dip       4\"x10      Step Down L/R    4\" 10 nv? 8\" 20 8\" 20 6\"x10 step over // bars 6\"x10 step over // bars 6\"x10 step over // bars    Step ups FW L/R 4\" 10 ea 4\" 15ea 4\" 15 ea 8\" 20 8\" 20 8\" 20       Step ups lat L/R  4\" 10 ea 4\" 15ea 4\" 15 ea 8\" 20 8\" 20 8\" 20       Gait Training                                       Modalities             Cp post tx -prn 10' 10' 10' def def                                "

## 2024-02-14 ENCOUNTER — OFFICE VISIT (OUTPATIENT)
Dept: PHYSICAL THERAPY | Facility: CLINIC | Age: 59
End: 2024-02-14
Payer: COMMERCIAL

## 2024-02-14 DIAGNOSIS — Z98.1 STATUS POST ARTHRODESIS: ICD-10-CM

## 2024-02-14 DIAGNOSIS — M21.42 PES PLANUS OF LEFT FOOT: Primary | ICD-10-CM

## 2024-02-14 DIAGNOSIS — Z98.890 POSTOPERATIVE STATE: ICD-10-CM

## 2024-02-14 PROCEDURE — 97112 NEUROMUSCULAR REEDUCATION: CPT

## 2024-02-14 PROCEDURE — 97110 THERAPEUTIC EXERCISES: CPT

## 2024-02-14 NOTE — PROGRESS NOTES
"Daily Note     Today's date: 2024  Patient name: Capri Preston  : 1965  MRN: [id number]  Referring provider: Bart Boswell DPM  Dx:   Encounter Diagnosis     ICD-10-CM    1. Pes planus of left foot  M21.42       2. Postoperative state  Z98.890       3. Status post arthrodesis  Z98.1                      Subjective:  Pt reports her foot still is sore and tender.      Objective: See treatment diary below      Assessment: Pt arrived 15minute late, accomodated. Pt performed below TE with good tolerance, noted significant inv tightness, reviewed stretch with pt, dispensed HEP stretch.      Plan: Continue per plan of care. Focus on progressing strengthening and proprioceptive ex.     Dx: s/p L foot talo-navicular arthrodesis w/tibialis posterior repair ( 23)  EPOC: 24  CO-MORBIDITIES:   PERSONAL FACTORS:   Precautions: none         Manuals 12/20 12/28 1/8 1/11 1/15 1/22 1/24 1/30 2/12 2/14   L Ankle PROM 10' JR wdc wdc wdc WE 5' 5' 3' Wdc 8'   L Ankle / gastroc MFR prn      5' 5' 5'    Progress note       5'      Total Time:    8' 8' 8' 8' 15'  8' 8'                Neuro Re-Ed             Mini squat       10x5\" airex 15x5\" 10x5\" airex np   sidestepping  3 laps 3laps Foam 3laps Foam 3laps  No UE  Foam 3laps  No UE  Foam 3 laps no UE Foam 3 laps no UE Foam 3 laps no UE Foam 3laps 12ft no UE     Tandem walking  3 laps 3laps Foam 3laps Foam 3laps No UE Foam 3laps  No UE  Foam 3 laps Foam 3 laps Foam 3 laps Foam 3laps   SLS     10\" 5 10\"x5 15\"x3 15\"x3 15\"x3 15\" 3                                          Ther Ex             Bike for ankle ROM L1 5' 5min L1 6' L1 6' L1 6' L1 6' 6' L1 6' L1 6'  L1 6'   4 way ankle tb ex Otb 20x ea OTB 20ea GTB 20 ea GTB 20 ea GTB 20 ea GTB 20 ea GTB x20  GTB x20 GTB x20  GTB x20   Standing HR 20x 20x 20 20 20 20x 10 slt bd 20 slt brd 20 slt bd 20 sit bd   Standing gastroc stretch L/R  1 min ea 20\" 3 ea 20\" 3 ea 20\" 3 ea 20\" 3 ea 1' slt bd 1 slt bd 1' slt bd 1' slt bd " "  Seated BAPS board  DF/PF inv/ev   20ea L2 DF/PF inv/ev   20ea L3 DF/PF inv/ev   20ea L3 DF/PF inv/ev   20ea L3 DF/PF inv/ev   20ea L3  DF/PF inv/ev   20ea L3  L3 inv/ev20ea   1/2 kneeling stretch: knee over 2nd MT                                                                 Ther Activity             Lat dip       4\"x10      Step Down L/R    4\" 10 nv? 8\" 20 8\" 20 6\"x10 step over // bars 6\"x10 step over // bars 6\"x10 step over // bars 6\" 20 step overs   Step ups FW L/R 4\" 10 ea 4\" 15ea 4\" 15 ea 8\" 20 8\" 20 8\" 20       Step ups lat L/R  4\" 10 ea 4\" 15ea 4\" 15 ea 8\" 20 8\" 20 8\" 20       Gait Training                                       Modalities             Cp post tx -prn 10' 10' 10' def def                                "

## 2024-02-19 ENCOUNTER — APPOINTMENT (OUTPATIENT)
Dept: PHYSICAL THERAPY | Facility: CLINIC | Age: 59
End: 2024-02-19
Payer: COMMERCIAL

## 2024-02-21 ENCOUNTER — OFFICE VISIT (OUTPATIENT)
Dept: PHYSICAL THERAPY | Facility: CLINIC | Age: 59
End: 2024-02-21
Payer: COMMERCIAL

## 2024-02-21 DIAGNOSIS — Z98.1 STATUS POST ARTHRODESIS: ICD-10-CM

## 2024-02-21 DIAGNOSIS — M21.42 PES PLANUS OF LEFT FOOT: Primary | ICD-10-CM

## 2024-02-21 DIAGNOSIS — Z98.890 POSTOPERATIVE STATE: ICD-10-CM

## 2024-02-21 PROCEDURE — 97110 THERAPEUTIC EXERCISES: CPT | Performed by: PHYSICAL THERAPIST

## 2024-02-21 PROCEDURE — 97140 MANUAL THERAPY 1/> REGIONS: CPT | Performed by: PHYSICAL THERAPIST

## 2024-02-21 NOTE — PROGRESS NOTES
"Daily Note / Discharge Note    Today's date: 2024  Patient name: Capri Preston  : 1965  MRN: [id number]  Referring provider: Bart Boswell DPM  Dx:   Encounter Diagnosis     ICD-10-CM    1. Pes planus of left foot  M21.42       2. Postoperative state  Z98.890       3. Status post arthrodesis  Z98.1                      Subjective: Pt reports she is feeling good with no pain in her foot. Pt arrived 10' late to apt.      Objective: See treatment diary below      Assessment: Tolerated treatment well. Patient exhibited good technique with therapeutic exercises. Pt was challenged by SLS. Pt had increased R knee pain with step overs, even with a modified lower step.      Plan:  D/c pt to an independent HEP. Pt was issued a green tband for home use.     Dx: s/p L foot talo-navicular arthrodesis w/tibialis posterior repair ( 23)  EPOC: 3/1624  CO-MORBIDITIES:   PERSONAL FACTORS:   Precautions: none         Manuals 2/21    1/15 1/22 1/24 1/30 2/12 2/14   L Ankle PROM 5'    wdc WE 5' 5' 3' Wdc 8'   L Ankle / gastroc MFR 5'      5' 5' 5'    Progress note 10'      5'      Total Time:      8' 8' 15'  8' 8'                Neuro Re-Ed             Mini squat on airex 10x5\"      10x5\" airex 15x5\" 10x5\" airex np   sidestepping     Foam 3laps  No UE  Foam 3laps  No UE  Foam 3 laps no UE Foam 3 laps no UE Foam 3 laps no UE Foam 3laps 12ft no UE     Tandem walking     Foam 3laps No UE Foam 3laps  No UE  Foam 3 laps Foam 3 laps Foam 3 laps Foam 3laps   SLS 15\"x3    10\" 5 10\"x5 15\"x3 15\"x3 15\"x3 15\" 3                                          Ther Ex             Bike for ankle ROM L1 5'    L1 6' L1 6' 6' L1 6' L1 6'  L1 6'   4 way ankle tb ex Otb 20x ea    GTB 20 ea GTB 20 ea GTB x20  GTB x20 GTB x20  GTB x20   Standing HR 20x slt bd    20 20x 10 slt bd 20 slt brd 20 slt bd 20 sit bd   Standing gastroc stretch L/R 1' slt bd 1 min ea 20\" 3 ea 20\" 3 ea 20\" 3 ea 20\" 3 ea 1' slt bd 1 slt bd 1' slt bd 1' slt bd   Seated " "BAPS board L3 ea dir DF/PF inv/ev   20ea L2 DF/PF inv/ev   20ea L3 DF/PF inv/ev   20ea L3 DF/PF inv/ev   20ea L3 DF/PF inv/ev   20ea L3  DF/PF inv/ev   20ea L3  L3 inv/ev20ea   1/2 kneeling stretch: knee over 2nd MT                                                                 Ther Activity             Lat dip       4\"x10      Step Down L/R 6\"-4\" x10   4\" 10 nv? 8\" 20 8\" 20 6\"x10 step over // bars 6\"x10 step over // bars 6\"x10 step over // bars 6\" 20 step overs   Step ups FW L/R  4\" 15ea 4\" 15 ea 8\" 20 8\" 20 8\" 20       Step ups lat L/R   4\" 15ea 4\" 15 ea 8\" 20 8\" 20 8\" 20       Gait Training                                       Modalities             Cp post tx -prn  10' 10' def def                                  "

## 2024-07-23 ENCOUNTER — APPOINTMENT (OUTPATIENT)
Dept: ORTHOPEDIC SURGERY | Facility: CLINIC | Age: 59
End: 2024-07-23
Payer: MEDICAID

## 2024-07-23 DIAGNOSIS — M75.41 IMPINGEMENT SYNDROME OF RIGHT SHOULDER: ICD-10-CM

## 2024-07-23 DIAGNOSIS — M17.11 UNILATERAL PRIMARY OSTEOARTHRITIS, RIGHT KNEE: ICD-10-CM

## 2024-07-23 PROCEDURE — 73562 X-RAY EXAM OF KNEE 3: CPT | Mod: RT

## 2024-07-23 PROCEDURE — 73030 X-RAY EXAM OF SHOULDER: CPT | Mod: RT

## 2024-07-23 PROCEDURE — 20610 DRAIN/INJ JOINT/BURSA W/O US: CPT | Mod: RT

## 2024-07-23 PROCEDURE — 99214 OFFICE O/P EST MOD 30 MIN: CPT | Mod: 25

## 2024-07-23 PROCEDURE — 73010 X-RAY EXAM OF SHOULDER BLADE: CPT | Mod: RT

## 2024-07-23 RX ORDER — DICLOFENAC SODIUM 75 MG/1
75 TABLET, DELAYED RELEASE ORAL
Qty: 30 | Refills: 0 | Status: ACTIVE | COMMUNITY
Start: 2024-07-23 | End: 1900-01-01

## 2024-07-23 NOTE — HISTORY OF PRESENT ILLNESS
[6] : 6 [4] : 4 [Dull/Aching] : dull/aching [Localized] : localized [Stabbing] : stabbing [Throbbing] : throbbing [Constant] : constant [Nothing helps with pain getting better] : Nothing helps with pain getting better [Walking] : walking [de-identified] : 07/23/24  :CHOLO SCRUGGS, a58 year old female, presents today for right knee / shoulder, states she has arthritis, states she would like an injection. states a pin / throbbing sensation in knee. states ROM for shoulder is slightly limited due to pain no prior injury for about 3 weeks, has pain while sleeping.

## 2024-07-23 NOTE — IMAGING
[AC Joint Arthrosis] : AC Joint Arthrosis [Type 2 acromion] : Type 2 acromion [Right] : right knee [All Views] : anteroposterior, lateral, skyline, and anteroposterior standing [advanced tricompartmental OA with medial compartment narrowing and varus alignment] : advanced tricompartmental OA with medial compartment narrowing and varus alignment [advanced tricompartmental OA with lateral compartment narrowing and valgus alignment] : advanced tricompartmental OA with lateral compartment narrowing and valgus alignment [Advanced patellofemoral OA] : Advanced patellofemoral OA [de-identified] :   ----------------------------------------------------------------------------   Right shoulder exam:   Skin: no significant pertinent finding Inspection: no obvious deformity, no obvious masses, no swelling, no effusion, no atrophy ROM:     FF: 175 painful endpoint    ER: 70    IR: T12 Tenderness:    (+) Anterior/Biceps:    (+) Posterior    (neg) Lateral    (neg) Trapezius    (neg) Scapula    (neg) AC joint    (neg) Crepitus with ROM Additional tests:    (+) Neer's    (+)Hawkin's    (neg) Hogan's    (neg) Speed    (neg) Cross chest adduction Strength: + pain    FF: 5/5    ER: 5/5    IR: 5/5    Biceps: 5/5    Triceps: 5/5    Distal: 5/5 Neuro: In tact to light touch throughout Vascularity: Extremity warm and well perfused

## 2024-07-23 NOTE — DISCUSSION/SUMMARY
[de-identified] : discussed PT/ CSI/ HA inj Discussed TKA CSI R knee submit for R knee euflexxa  Diclofenac 75mg BID 4-5 days then prn PT rx f/u 6-8 weeks   ----------------------------------------------------------------------------  Patient warned of specific risks of medication related to bleeding, GI issues, increase blood pressure, and cardiac risks in addition to additional risks.  Patient advised to discuss with PMD  if any presence of stated issues.  ----------------------------------------------------------------------------  Large joint corticosteroid injection given: Right knee  Patient indicated for injection after trial of rest, OTC medications including aspirin, Ibuprofen, Aleve etc or prescription NSAIDS, and/or exercises at home and/ or physical therapy without satisfactory response.  Patient has symptoms including pain, swelling, and/or decreased mobility in the joint. The risks, benefits, and alternatives to corticosteroid injection were explained in full to the patient, including but not limited to infection, sepsis, bleeding, scarring, skin discoloration, temporary increase in pain, syncopal episode, failure to resolve symptoms, allergic reaction, symptom recurrence, and elevation of blood sugar in diabetics. Patient understood the risks. All questions were answered. After discussion of options, patient requested an injection.   Oral informed consent was obtained and sterile technique was utilized for the procedure including the preparation of the solutions used for the injection and betadine followed by alcohol prep to the injection site. Anesthesia was given with ethyl chloride sprayed topically. The injection was delivered. Patient tolerated the procedure well.   Post Procedure Instructions: Patient was advised to call if redness, pain, or fever occur and apply ice for 15 min on and 15 min off later today  Medications delivered: Kenalog 10 mg, Lidocaine: 4 cc ----------------------------------------------------------------------------   All relevant imaging studies pertinent to today's visit, including x-rays, MRI's and/or other advanced imaging studies (CT/etc) were independently interpreted and reviewed with the patient as needed. Implications of the studies together with the patient's clinical picture were discussed to formulate a working diagnosis and management options were detailed.   The patient and/or guardian was advised of the diagnosis.  The natural history of the pathology was explained in full. All questions were answered.  The risks and benefits of conservative and interventional treatment alternatives were explained to the patient  The patient and/or guardian was advised if any advanced diagnostic/imaging study (MRI/CT/etc) is ordered to evaluate potential pathology in the affected area(s), they should follow up in the office to review the results of the study and determine further management that may be indicated.

## 2024-09-30 ENCOUNTER — APPOINTMENT (OUTPATIENT)
Dept: ORTHOPEDIC SURGERY | Facility: CLINIC | Age: 59
End: 2024-09-30

## 2024-10-29 ENCOUNTER — APPOINTMENT (OUTPATIENT)
Dept: ORTHOPEDIC SURGERY | Facility: CLINIC | Age: 59
End: 2024-10-29
Payer: MEDICAID

## 2024-10-29 VITALS — WEIGHT: 155 LBS | BODY MASS INDEX: 29.27 KG/M2 | HEIGHT: 61 IN

## 2024-10-29 DIAGNOSIS — M43.16 SPONDYLOLISTHESIS, LUMBAR REGION: ICD-10-CM

## 2024-10-29 DIAGNOSIS — M47.896 OTHER SPONDYLOSIS, LUMBAR REGION: ICD-10-CM

## 2024-10-29 DIAGNOSIS — M51.369: ICD-10-CM

## 2024-10-29 PROCEDURE — 99214 OFFICE O/P EST MOD 30 MIN: CPT

## 2024-10-29 PROCEDURE — 72100 X-RAY EXAM L-S SPINE 2/3 VWS: CPT

## 2024-10-29 PROCEDURE — 73502 X-RAY EXAM HIP UNI 2-3 VIEWS: CPT

## 2024-10-29 RX ORDER — CYCLOBENZAPRINE HYDROCHLORIDE 5 MG/1
5 TABLET, FILM COATED ORAL
Qty: 30 | Refills: 0 | Status: ACTIVE | COMMUNITY
Start: 2024-10-29 | End: 1900-01-01

## 2024-10-29 RX ORDER — MELOXICAM 15 MG/1
15 TABLET ORAL
Qty: 30 | Refills: 0 | Status: ACTIVE | COMMUNITY
Start: 2024-10-29 | End: 1900-01-01

## 2024-12-02 ENCOUNTER — TELEPHONE (OUTPATIENT)
Dept: OBGYN CLINIC | Facility: CLINIC | Age: 59
End: 2024-12-02

## 2024-12-02 NOTE — TELEPHONE ENCOUNTER
Caller: Capri    Doctor: Teresa    Reason for call: Patient returned call. Patient states she no longer lives in [place] and is unable to  the form. Patient is requesting us to mail the form to the address below, or to have her son come in and  the form. Please call patient to let her know which we can do for her. Thank you!    [address]  [place] [address]    Call back#: [phone]

## 2024-12-02 NOTE — TELEPHONE ENCOUNTER
Received incoming fax from  [facility] requesting medical clearance request form to be filled out. Called [facility] stated they do not have a fax machine unable to fax form back.  They stated for us to contact patient to  form.  LVM asking patient to stop by the office to  completed form.

## 2025-01-13 ENCOUNTER — APPOINTMENT (OUTPATIENT)
Dept: ORTHOPEDIC SURGERY | Facility: CLINIC | Age: 60
End: 2025-01-13

## 2025-03-06 ENCOUNTER — APPOINTMENT (OUTPATIENT)
Dept: ORTHOPEDIC SURGERY | Facility: CLINIC | Age: 60
End: 2025-03-06
Payer: MEDICARE

## 2025-03-06 DIAGNOSIS — M17.11 UNILATERAL PRIMARY OSTEOARTHRITIS, RIGHT KNEE: ICD-10-CM

## 2025-03-06 DIAGNOSIS — M51.369: ICD-10-CM

## 2025-03-06 PROCEDURE — 99204 OFFICE O/P NEW MOD 45 MIN: CPT | Mod: 25

## 2025-03-06 PROCEDURE — 20610 DRAIN/INJ JOINT/BURSA W/O US: CPT | Mod: RT

## 2025-03-18 ENCOUNTER — APPOINTMENT (OUTPATIENT)
Dept: ORTHOPEDIC SURGERY | Facility: CLINIC | Age: 60
End: 2025-03-18
Payer: MEDICARE

## 2025-03-18 PROCEDURE — 20610 DRAIN/INJ JOINT/BURSA W/O US: CPT | Mod: RT

## 2025-03-18 PROCEDURE — 99024 POSTOP FOLLOW-UP VISIT: CPT

## 2025-03-26 ENCOUNTER — APPOINTMENT (OUTPATIENT)
Dept: ORTHOPEDIC SURGERY | Facility: CLINIC | Age: 60
End: 2025-03-26
Payer: MEDICARE

## 2025-03-26 PROCEDURE — 20610 DRAIN/INJ JOINT/BURSA W/O US: CPT | Mod: RT

## 2025-04-03 ENCOUNTER — APPOINTMENT (OUTPATIENT)
Dept: ORTHOPEDIC SURGERY | Facility: CLINIC | Age: 60
End: 2025-04-03
Payer: MEDICARE

## 2025-04-03 DIAGNOSIS — M17.11 UNILATERAL PRIMARY OSTEOARTHRITIS, RIGHT KNEE: ICD-10-CM

## 2025-04-03 PROCEDURE — 20610 DRAIN/INJ JOINT/BURSA W/O US: CPT | Mod: RT

## 2025-04-21 ENCOUNTER — APPOINTMENT (OUTPATIENT)
Dept: CARDIOLOGY | Facility: CLINIC | Age: 60
End: 2025-04-21
Payer: MEDICARE

## 2025-04-21 ENCOUNTER — NON-APPOINTMENT (OUTPATIENT)
Age: 60
End: 2025-04-21

## 2025-04-21 VITALS
SYSTOLIC BLOOD PRESSURE: 116 MMHG | BODY MASS INDEX: 28.89 KG/M2 | OXYGEN SATURATION: 95 % | HEART RATE: 61 BPM | WEIGHT: 153 LBS | TEMPERATURE: 98.9 F | DIASTOLIC BLOOD PRESSURE: 79 MMHG | HEIGHT: 61 IN

## 2025-04-21 DIAGNOSIS — R07.9 CHEST PAIN, UNSPECIFIED: ICD-10-CM

## 2025-04-21 PROCEDURE — 99204 OFFICE O/P NEW MOD 45 MIN: CPT | Mod: 25

## 2025-04-21 PROCEDURE — 99406 BEHAV CHNG SMOKING 3-10 MIN: CPT

## 2025-04-21 PROCEDURE — 93000 ELECTROCARDIOGRAM COMPLETE: CPT

## 2025-04-28 ENCOUNTER — APPOINTMENT (OUTPATIENT)
Dept: CV DIAGNOSTICS | Facility: HOSPITAL | Age: 60
End: 2025-04-28

## 2025-04-28 ENCOUNTER — OUTPATIENT (OUTPATIENT)
Dept: OUTPATIENT SERVICES | Facility: HOSPITAL | Age: 60
LOS: 1 days | End: 2025-04-28
Payer: MEDICARE

## 2025-04-28 ENCOUNTER — RESULT REVIEW (OUTPATIENT)
Age: 60
End: 2025-04-28

## 2025-04-28 DIAGNOSIS — Z98.89 OTHER SPECIFIED POSTPROCEDURAL STATES: Chronic | ICD-10-CM

## 2025-04-28 DIAGNOSIS — R07.9 CHEST PAIN, UNSPECIFIED: ICD-10-CM

## 2025-04-28 PROCEDURE — 78452 HT MUSCLE IMAGE SPECT MULT: CPT | Mod: 26

## 2025-04-28 PROCEDURE — 78452 HT MUSCLE IMAGE SPECT MULT: CPT | Mod: MC

## 2025-04-28 PROCEDURE — 93017 CV STRESS TEST TRACING ONLY: CPT

## 2025-04-28 PROCEDURE — A9500: CPT

## 2025-04-28 PROCEDURE — 93018 CV STRESS TEST I&R ONLY: CPT

## 2025-04-28 PROCEDURE — 93016 CV STRESS TEST SUPVJ ONLY: CPT

## 2025-05-01 ENCOUNTER — APPOINTMENT (OUTPATIENT)
Dept: CARDIOLOGY | Facility: CLINIC | Age: 60
End: 2025-05-01
Payer: MEDICARE

## 2025-05-01 PROCEDURE — 93306 TTE W/DOPPLER COMPLETE: CPT

## 2025-05-01 PROCEDURE — 93880 EXTRACRANIAL BILAT STUDY: CPT

## 2025-05-15 ENCOUNTER — APPOINTMENT (OUTPATIENT)
Dept: ORTHOPEDIC SURGERY | Facility: CLINIC | Age: 60
End: 2025-05-15

## 2025-06-02 ENCOUNTER — APPOINTMENT (OUTPATIENT)
Dept: ORTHOPEDIC SURGERY | Facility: CLINIC | Age: 60
End: 2025-06-02
Payer: MEDICARE

## 2025-06-02 DIAGNOSIS — M17.11 UNILATERAL PRIMARY OSTEOARTHRITIS, RIGHT KNEE: ICD-10-CM

## 2025-06-02 PROCEDURE — 99213 OFFICE O/P EST LOW 20 MIN: CPT

## 2025-06-02 RX ORDER — MELOXICAM 15 MG/1
15 TABLET ORAL
Qty: 30 | Refills: 1 | Status: ACTIVE | COMMUNITY
Start: 2025-06-02 | End: 1900-01-01

## (undated) DEVICE — ASTOUND STANDARD SURGICAL GOWN, XXL: Brand: CONVERTORS

## (undated) DEVICE — SUT STRATAFIX SPIRAL 3-0 PGA/PCL 30 X 30 CM SXMD2B408

## (undated) DEVICE — SPINNING CEMENT MIXING BOWL

## (undated) DEVICE — HOOD: Brand: FLYTE, SURGICOOL

## (undated) DEVICE — PLUMEPEN PRO 10FT

## (undated) DEVICE — NEEDLE HYPO 22G X 1-1/2 IN

## (undated) DEVICE — HEAVY DUTY TABLE COVER: Brand: CONVERTORS

## (undated) DEVICE — SUT VICRYL 0 CT-1 27 IN J260H

## (undated) DEVICE — DRAPE SHEET THREE QUARTER

## (undated) DEVICE — GLOVE INDICATOR PI UNDERGLOVE SZ 8 BLUE

## (undated) DEVICE — CHLORAPREP HI-LITE 26ML ORANGE

## (undated) DEVICE — ANTIBACTERIAL VIOLET BRAIDED (POLYGLACTIN 910), SYNTHETIC ABSORBABLE SURGICAL SUTURE: Brand: COATED VICRYL

## (undated) DEVICE — CUFF TOURNIQUET 30 X 4 IN QUICK CONNECT DISP 1BLA

## (undated) DEVICE — GLOVE SRG BIOGEL 7

## (undated) DEVICE — NEEDLE 18 G X 1 1/2

## (undated) DEVICE — 3M™ STERI-STRIP™ REINFORCED ADHESIVE SKIN CLOSURES, R1547, 1/2 IN X 4 IN (12 MM X 100 MM), 6 STRIPS/ENVELOPE: Brand: 3M™ STERI-STRIP™

## (undated) DEVICE — CAPIT KNEE ATTUNE RP CEMENT - DEPUY

## (undated) DEVICE — MEDI-VAC YANKAUER SUCTION HANDLE W/BULBOUS AND CONTROL VENT: Brand: CARDINAL HEALTH

## (undated) DEVICE — GLOVE SRG BIOGEL 7.5

## (undated) DEVICE — GLOVE INDICATOR PI UNDERGLOVE SZ 7 BLUE

## (undated) DEVICE — BLADE REAMER PATELLA 38MM

## (undated) DEVICE — 3M™ STERI-STRIP™ COMPOUND BENZOIN TINCTURE 40 BAGS/CARTON 4 CARTONS/CASE C1544: Brand: 3M™ STERI-STRIP™

## (undated) DEVICE — SAW BLADE RECIPROCATING 179

## (undated) DEVICE — BANDAGE, ESMARK LF STR 6"X9' (20/CS): Brand: CYPRESS

## (undated) DEVICE — GLOVE SRG BIOGEL ORTHOPEDIC 7.5

## (undated) DEVICE — ACE WRAP 6 IN UNSTERILE

## (undated) DEVICE — ANTIBACTERIAL UNDYED BRAIDED (POLYGLACTIN 910), SYNTHETIC ABSORBABLE SUTURE: Brand: COATED VICRYL

## (undated) DEVICE — 5065 IMPAD REGULAR PAIR: Brand: A-V IMPULSE

## (undated) DEVICE — YELLOW BOAT

## (undated) DEVICE — THE SIMPULSE SOLO SYSTEM WITH ULTREX RETRACTABLE SPLASH SHIELD TIP: Brand: SIMPULSE SOLO

## (undated) DEVICE — COBAN 6 IN STERILE

## (undated) DEVICE — 3M™ IOBAN™ 2 ANTIMICROBIAL INCISE DRAPE 6650EZ: Brand: IOBAN™ 2

## (undated) DEVICE — BLADE INTREX LRG BONE OSCILLATING

## (undated) DEVICE — CAST PADDING 6 IN STERILE

## (undated) DEVICE — DRESSING MEPILEX AG BORDER POST-OP 4 X 12 IN

## (undated) DEVICE — ABDOMINAL PAD: Brand: DERMACEA

## (undated) DEVICE — SYRINGE 30ML LL

## (undated) DEVICE — UNIVERSAL MAJOR EXTREMITY,KIT: Brand: CARDINAL HEALTH

## (undated) DEVICE — INTENDED FOR TISSUE SEPARATION, AND OTHER PROCEDURES THAT REQUIRE A SHARP SURGICAL BLADE TO PUNCTURE OR CUT.: Brand: BARD-PARKER SAFETY BLADES SIZE 10, STERILE

## (undated) DEVICE — 450 ML BOTTLE OF 0.05% CHLORHEXIDINE GLUCONATE IN 99.95% STERILE WATER FOR IRRIGATION, USP AND APPLICATOR.: Brand: IRRISEPT ANTIMICROBIAL WOUND LAVAGE